# Patient Record
Sex: MALE | Race: WHITE | Employment: FULL TIME | URBAN - METROPOLITAN AREA
[De-identification: names, ages, dates, MRNs, and addresses within clinical notes are randomized per-mention and may not be internally consistent; named-entity substitution may affect disease eponyms.]

---

## 2017-01-03 DIAGNOSIS — F51.01 PRIMARY INSOMNIA: ICD-10-CM

## 2017-01-03 DIAGNOSIS — E06.3 CHRONIC LYMPHOCYTIC THYROIDITIS: Primary | ICD-10-CM

## 2017-01-03 DIAGNOSIS — R45.4 IRRITABLE: ICD-10-CM

## 2017-01-03 DIAGNOSIS — E06.3 CHRONIC LYMPHOCYTIC THYROIDITIS: ICD-10-CM

## 2017-01-03 PROCEDURE — 36415 COLL VENOUS BLD VENIPUNCTURE: CPT | Performed by: NURSE PRACTITIONER

## 2017-01-03 RX ORDER — LEVOTHYROXINE SODIUM 0.12 MG/1
TABLET ORAL
Qty: 180 TABLET | Refills: 0 | Status: SHIPPED | OUTPATIENT
Start: 2017-01-03 | End: 2017-09-29 | Stop reason: DRUGHIGH

## 2017-01-03 RX ORDER — ZOLPIDEM TARTRATE 5 MG/1
5 TABLET ORAL NIGHTLY PRN
Qty: 30 TABLET | Refills: 0 | Status: SHIPPED | OUTPATIENT
Start: 2017-01-03 | End: 2017-03-27 | Stop reason: SDUPTHER

## 2017-01-03 RX ORDER — ARIPIPRAZOLE 5 MG/1
5 TABLET ORAL DAILY
Qty: 90 TABLET | Refills: 0 | Status: SHIPPED | OUTPATIENT
Start: 2017-01-03 | End: 2017-06-19 | Stop reason: SDUPTHER

## 2017-02-03 ENCOUNTER — TELEPHONE (OUTPATIENT)
Dept: FAMILY MEDICINE CLINIC | Age: 37
End: 2017-02-03

## 2017-02-13 ENCOUNTER — NURSE ONLY (OUTPATIENT)
Dept: FAMILY MEDICINE CLINIC | Age: 37
End: 2017-02-13

## 2017-02-13 DIAGNOSIS — E06.3 CHRONIC LYMPHOCYTIC THYROIDITIS: Primary | ICD-10-CM

## 2017-02-13 LAB
T4 FREE: 1 NG/DL (ref 0.9–1.8)
TSH SERPL DL<=0.05 MIU/L-ACNC: 31.25 UIU/ML (ref 0.27–4.2)

## 2017-02-13 PROCEDURE — 36415 COLL VENOUS BLD VENIPUNCTURE: CPT | Performed by: FAMILY MEDICINE

## 2017-03-14 ENCOUNTER — OFFICE VISIT (OUTPATIENT)
Dept: FAMILY MEDICINE CLINIC | Age: 37
End: 2017-03-14

## 2017-03-14 VITALS
WEIGHT: 308 LBS | SYSTOLIC BLOOD PRESSURE: 122 MMHG | TEMPERATURE: 97.9 F | DIASTOLIC BLOOD PRESSURE: 76 MMHG | BODY MASS INDEX: 38.3 KG/M2 | HEART RATE: 97 BPM | HEIGHT: 75 IN

## 2017-03-14 DIAGNOSIS — E06.3 CHRONIC LYMPHOCYTIC THYROIDITIS: Primary | ICD-10-CM

## 2017-03-14 DIAGNOSIS — R06.83 SNORES: ICD-10-CM

## 2017-03-14 DIAGNOSIS — E66.9 OBESITY (BMI 30-39.9): ICD-10-CM

## 2017-03-14 PROCEDURE — 99214 OFFICE O/P EST MOD 30 MIN: CPT | Performed by: FAMILY MEDICINE

## 2017-03-14 RX ORDER — LEVOTHYROXINE SODIUM 137 UG/1
274 TABLET ORAL DAILY
Qty: 60 TABLET | Refills: 1 | Status: SHIPPED | OUTPATIENT
Start: 2017-03-14 | End: 2017-06-19 | Stop reason: SDUPTHER

## 2017-03-14 ASSESSMENT — ENCOUNTER SYMPTOMS: CONSTIPATION: 0

## 2017-05-12 ENCOUNTER — TELEPHONE (OUTPATIENT)
Dept: FAMILY MEDICINE CLINIC | Age: 37
End: 2017-05-12

## 2017-05-18 ENCOUNTER — NURSE ONLY (OUTPATIENT)
Dept: FAMILY MEDICINE CLINIC | Age: 37
End: 2017-05-18

## 2017-05-18 DIAGNOSIS — E06.3 CHRONIC LYMPHOCYTIC THYROIDITIS: ICD-10-CM

## 2017-05-18 DIAGNOSIS — E06.3 CHRONIC LYMPHOCYTIC THYROIDITIS: Primary | ICD-10-CM

## 2017-05-18 LAB
T4 FREE: 0.9 NG/DL (ref 0.9–1.8)
TSH SERPL DL<=0.05 MIU/L-ACNC: 21.34 UIU/ML (ref 0.27–4.2)

## 2017-05-18 PROCEDURE — 36415 COLL VENOUS BLD VENIPUNCTURE: CPT | Performed by: FAMILY MEDICINE

## 2017-05-23 DIAGNOSIS — E06.3 CHRONIC LYMPHOCYTIC THYROIDITIS: Primary | ICD-10-CM

## 2017-06-19 ENCOUNTER — TELEPHONE (OUTPATIENT)
Dept: FAMILY MEDICINE CLINIC | Age: 37
End: 2017-06-19

## 2017-06-19 DIAGNOSIS — R45.4 IRRITABLE: ICD-10-CM

## 2017-06-19 DIAGNOSIS — E06.3 CHRONIC LYMPHOCYTIC THYROIDITIS: ICD-10-CM

## 2017-06-19 RX ORDER — ARIPIPRAZOLE 5 MG/1
5 TABLET ORAL DAILY
Qty: 90 TABLET | Refills: 0 | Status: SHIPPED | OUTPATIENT
Start: 2017-06-19 | End: 2017-09-29 | Stop reason: SDUPTHER

## 2017-06-19 RX ORDER — LEVOTHYROXINE SODIUM 137 UG/1
TABLET ORAL
Qty: 60 TABLET | Refills: 0 | Status: SHIPPED | OUTPATIENT
Start: 2017-06-19 | End: 2017-07-29 | Stop reason: SDUPTHER

## 2017-07-25 ENCOUNTER — TELEPHONE (OUTPATIENT)
Dept: FAMILY MEDICINE CLINIC | Age: 37
End: 2017-07-25

## 2017-07-28 ENCOUNTER — NURSE ONLY (OUTPATIENT)
Dept: FAMILY MEDICINE CLINIC | Age: 37
End: 2017-07-28

## 2017-07-28 DIAGNOSIS — E06.3 CHRONIC LYMPHOCYTIC THYROIDITIS: Primary | ICD-10-CM

## 2017-07-28 DIAGNOSIS — E06.3 CHRONIC LYMPHOCYTIC THYROIDITIS: ICD-10-CM

## 2017-07-28 LAB
T4 FREE: 1.6 NG/DL (ref 0.9–1.8)
TSH SERPL DL<=0.05 MIU/L-ACNC: 2.91 UIU/ML (ref 0.27–4.2)

## 2017-07-28 PROCEDURE — 36415 COLL VENOUS BLD VENIPUNCTURE: CPT | Performed by: FAMILY MEDICINE

## 2017-07-29 DIAGNOSIS — E06.3 CHRONIC LYMPHOCYTIC THYROIDITIS: ICD-10-CM

## 2017-07-31 RX ORDER — LEVOTHYROXINE SODIUM 137 UG/1
TABLET ORAL
Qty: 60 TABLET | Refills: 0 | Status: SHIPPED | OUTPATIENT
Start: 2017-07-31 | End: 2017-09-06 | Stop reason: SDUPTHER

## 2017-09-06 ENCOUNTER — TELEPHONE (OUTPATIENT)
Dept: FAMILY MEDICINE CLINIC | Age: 37
End: 2017-09-06

## 2017-09-06 DIAGNOSIS — E06.3 CHRONIC LYMPHOCYTIC THYROIDITIS: ICD-10-CM

## 2017-09-06 RX ORDER — LEVOTHYROXINE SODIUM 137 UG/1
274 TABLET ORAL DAILY
Qty: 60 TABLET | Refills: 0 | Status: SHIPPED | OUTPATIENT
Start: 2017-09-06 | End: 2017-09-29 | Stop reason: SDUPTHER

## 2017-09-29 ENCOUNTER — PATIENT MESSAGE (OUTPATIENT)
Dept: FAMILY MEDICINE CLINIC | Age: 37
End: 2017-09-29

## 2017-09-29 DIAGNOSIS — R45.4 IRRITABLE: ICD-10-CM

## 2017-09-29 DIAGNOSIS — E06.3 CHRONIC LYMPHOCYTIC THYROIDITIS: ICD-10-CM

## 2017-09-29 RX ORDER — ARIPIPRAZOLE 5 MG/1
5 TABLET ORAL DAILY
Qty: 90 TABLET | Refills: 0 | Status: SHIPPED | OUTPATIENT
Start: 2017-09-29 | End: 2018-02-08 | Stop reason: SDUPTHER

## 2017-09-29 RX ORDER — LEVOTHYROXINE SODIUM 137 UG/1
274 TABLET ORAL DAILY
Qty: 60 TABLET | Refills: 0 | Status: SHIPPED | OUTPATIENT
Start: 2017-09-29 | End: 2017-10-13 | Stop reason: SDUPTHER

## 2017-10-13 ENCOUNTER — OFFICE VISIT (OUTPATIENT)
Dept: FAMILY MEDICINE CLINIC | Age: 37
End: 2017-10-13

## 2017-10-13 VITALS
TEMPERATURE: 98.7 F | DIASTOLIC BLOOD PRESSURE: 82 MMHG | SYSTOLIC BLOOD PRESSURE: 114 MMHG | BODY MASS INDEX: 38.92 KG/M2 | HEART RATE: 88 BPM | HEIGHT: 75 IN | OXYGEN SATURATION: 98 % | WEIGHT: 313 LBS

## 2017-10-13 DIAGNOSIS — E06.3 CHRONIC LYMPHOCYTIC THYROIDITIS: Primary | ICD-10-CM

## 2017-10-13 DIAGNOSIS — E66.01 MORBID OBESITY WITH BODY MASS INDEX (BMI) OF 40.0 TO 49.9 (HCC): ICD-10-CM

## 2017-10-13 DIAGNOSIS — Z00.00 PREVENTATIVE HEALTH CARE: ICD-10-CM

## 2017-10-13 DIAGNOSIS — Z23 NEED FOR PROPHYLACTIC VACCINATION AND INOCULATION AGAINST INFLUENZA: ICD-10-CM

## 2017-10-13 DIAGNOSIS — R45.4 IRRITABLE: ICD-10-CM

## 2017-10-13 LAB
A/G RATIO: 1.5 (ref 1.1–2.2)
ALBUMIN SERPL-MCNC: 4.3 G/DL (ref 3.4–5)
ALP BLD-CCNC: 52 U/L (ref 40–129)
ALT SERPL-CCNC: 28 U/L (ref 10–40)
ANION GAP SERPL CALCULATED.3IONS-SCNC: 15 MMOL/L (ref 3–16)
AST SERPL-CCNC: 21 U/L (ref 15–37)
BILIRUB SERPL-MCNC: 0.5 MG/DL (ref 0–1)
BUN BLDV-MCNC: 15 MG/DL (ref 7–20)
CALCIUM SERPL-MCNC: 9.5 MG/DL (ref 8.3–10.6)
CHLORIDE BLD-SCNC: 103 MMOL/L (ref 99–110)
CHOLESTEROL, TOTAL: 197 MG/DL (ref 0–199)
CO2: 25 MMOL/L (ref 21–32)
CREAT SERPL-MCNC: 0.8 MG/DL (ref 0.9–1.3)
GFR AFRICAN AMERICAN: >60
GFR NON-AFRICAN AMERICAN: >60
GLOBULIN: 2.8 G/DL
GLUCOSE BLD-MCNC: 95 MG/DL (ref 70–99)
HDLC SERPL-MCNC: 33 MG/DL (ref 40–60)
LDL CHOLESTEROL CALCULATED: 120 MG/DL
POTASSIUM SERPL-SCNC: 4.5 MMOL/L (ref 3.5–5.1)
SODIUM BLD-SCNC: 143 MMOL/L (ref 136–145)
TOTAL PROTEIN: 7.1 G/DL (ref 6.4–8.2)
TRIGL SERPL-MCNC: 220 MG/DL (ref 0–150)
TSH SERPL DL<=0.05 MIU/L-ACNC: 0.42 UIU/ML (ref 0.27–4.2)
VLDLC SERPL CALC-MCNC: 44 MG/DL

## 2017-10-13 PROCEDURE — 99214 OFFICE O/P EST MOD 30 MIN: CPT | Performed by: FAMILY MEDICINE

## 2017-10-13 PROCEDURE — 90630 INFLUENZA, QUADV, 18-64 YRS, ID, PF, MICRO INJ, 0.1ML (FLUZONE QUADV, PF): CPT | Performed by: FAMILY MEDICINE

## 2017-10-13 PROCEDURE — 90471 IMMUNIZATION ADMIN: CPT | Performed by: FAMILY MEDICINE

## 2017-10-13 PROCEDURE — 36415 COLL VENOUS BLD VENIPUNCTURE: CPT | Performed by: FAMILY MEDICINE

## 2017-10-13 RX ORDER — LEVOTHYROXINE SODIUM 137 UG/1
274 TABLET ORAL DAILY
Qty: 60 TABLET | Refills: 4 | Status: SHIPPED | OUTPATIENT
Start: 2017-10-13 | End: 2018-06-14 | Stop reason: SDUPTHER

## 2017-10-13 ASSESSMENT — PATIENT HEALTH QUESTIONNAIRE - PHQ9
SUM OF ALL RESPONSES TO PHQ QUESTIONS 1-9: 0
2. FEELING DOWN, DEPRESSED OR HOPELESS: 0
SUM OF ALL RESPONSES TO PHQ9 QUESTIONS 1 & 2: 0
1. LITTLE INTEREST OR PLEASURE IN DOING THINGS: 0

## 2017-10-13 ASSESSMENT — ENCOUNTER SYMPTOMS: CONSTIPATION: 0

## 2017-10-13 NOTE — PATIENT INSTRUCTIONS
season. But even when the vaccine doesn't exactly match these viruses, it may still provide some protection. Flu vaccine cannot prevent:  · Flu that is caused by a virus not covered by the vaccine. · Illnesses that look like flu but are not. Some people should not get this vaccine  Tell the person who is giving you the vaccine:  · If you have any severe (life-threatening) allergies. If you ever had a life-threatening allergic reaction after a dose of flu vaccine, or have a severe allergy to any part of this vaccine, you may be advised not to get vaccinated. Most, but not all, types of flu vaccine contain a small amount of egg protein. · If you ever had Guillain-Barré syndrome (also called GBS) Some people with a history of GBS should not get this vaccine. This should be discussed with your doctor. · If you are not feeling well. It is usually okay to get flu vaccine when you have a mild illness, but you might be asked to come back when you feel better. Risks of a vaccine reaction  With any medicine, including vaccines, there is a chance of reactions. These are usually mild and go away on their own, but serious reactions are also possible. Most people who get a flu shot do not have any problems with it. Minor problems following a flu shot include:  · Soreness, redness, or swelling where the shot was given  · Hoarseness  · Sore, red or itchy eyes  · Cough  · Fever  · Aches  · Headache  · Itching  · Fatigue  If these problems occur, they usually begin soon after the shot and last 1 or 2 days. More serious problems following a flu shot can include the following:  · There may be a small increased risk of Guillain-Barré Syndrome (GBS) after inactivated flu vaccine. This risk has been estimated at 1 or 2 additional cases per million people vaccinated. This is much lower than the risk of severe complications from flu, which can be prevented by flu vaccine.   · Alyssa Ship children who get the flu shot along with pneumococcal vaccine (PCV13) and/or DTaP vaccine at the same time might be slightly more likely to have a seizure caused by fever. Ask your doctor for more information. Tell your doctor if a child who is getting flu vaccine has ever had a seizure  Problems that could happen after any injected vaccine:  · People sometimes faint after a medical procedure, including vaccination. Sitting or lying down for about 15 minutes can help prevent fainting, and injuries caused by a fall. Tell your doctor if you feel dizzy, or have vision changes or ringing in the ears. · Some people get severe pain in the shoulder and have difficulty moving the arm where a shot was given. This happens very rarely. · Any medication can cause a severe allergic reaction. Such reactions from a vaccine are very rare, estimated at about 1 in a million doses, and would happen within a few minutes to a few hours after the vaccination. As with any medicine, there is a very remote chance of a vaccine causing a serious injury or death. The safety of vaccines is always being monitored. For more information, visit: www.cdc.gov/vaccinesafety/. What if there is a serious reaction? What should I look for? · Look for anything that concerns you, such as signs of a severe allergic reaction, very high fever, or unusual behavior. Signs of a severe allergic reaction can include hives, swelling of the face and throat, difficulty breathing, a fast heartbeat, dizziness, and weakness  usually within a few minutes to a few hours after the vaccination. What should I do? · If you think it is a severe allergic reaction or other emergency that can't wait, call 9-1-1 and get the person to the nearest hospital. Otherwise, call your doctor. · Reactions should be reported to the \"Vaccine Adverse Event Reporting System\" (VAERS). Your doctor should file this report, or you can do it yourself through the VAERS website at www.vaers. hhs.gov, or by calling

## 2018-01-16 ENCOUNTER — OFFICE VISIT (OUTPATIENT)
Dept: FAMILY MEDICINE CLINIC | Age: 38
End: 2018-01-16

## 2018-01-16 VITALS
WEIGHT: 315 LBS | DIASTOLIC BLOOD PRESSURE: 72 MMHG | TEMPERATURE: 98.7 F | BODY MASS INDEX: 39.87 KG/M2 | HEART RATE: 106 BPM | SYSTOLIC BLOOD PRESSURE: 118 MMHG | OXYGEN SATURATION: 95 %

## 2018-01-16 DIAGNOSIS — B34.9 VIRAL ILLNESS: ICD-10-CM

## 2018-01-16 DIAGNOSIS — R06.2 WHEEZING: Primary | ICD-10-CM

## 2018-01-16 LAB
INFLUENZA A ANTIBODY: NORMAL
INFLUENZA B ANTIBODY: NORMAL

## 2018-01-16 PROCEDURE — 87804 INFLUENZA ASSAY W/OPTIC: CPT | Performed by: FAMILY MEDICINE

## 2018-01-16 PROCEDURE — 99214 OFFICE O/P EST MOD 30 MIN: CPT | Performed by: FAMILY MEDICINE

## 2018-01-16 RX ORDER — BUDESONIDE AND FORMOTEROL FUMARATE DIHYDRATE 160; 4.5 UG/1; UG/1
2 AEROSOL RESPIRATORY (INHALATION) 2 TIMES DAILY
Qty: 1 INHALER | Refills: 3 | Status: SHIPPED | OUTPATIENT
Start: 2018-01-16 | End: 2018-04-05 | Stop reason: ALTCHOICE

## 2018-01-16 RX ORDER — ALBUTEROL SULFATE 90 UG/1
2 AEROSOL, METERED RESPIRATORY (INHALATION)
Qty: 1 INHALER | Refills: 1 | Status: SHIPPED | OUTPATIENT
Start: 2018-01-16 | End: 2018-04-05 | Stop reason: ALTCHOICE

## 2018-01-16 ASSESSMENT — ENCOUNTER SYMPTOMS
SHORTNESS OF BREATH: 1
SORE THROAT: 0
COUGH: 1

## 2018-01-25 ENCOUNTER — PATIENT MESSAGE (OUTPATIENT)
Dept: FAMILY MEDICINE CLINIC | Age: 38
End: 2018-01-25

## 2018-01-26 RX ORDER — METHYLPREDNISOLONE 4 MG/1
TABLET ORAL
Qty: 21 TABLET | Refills: 0 | Status: SHIPPED | OUTPATIENT
Start: 2018-01-26 | End: 2018-02-01

## 2018-01-26 RX ORDER — AZITHROMYCIN 250 MG/1
TABLET, FILM COATED ORAL
Qty: 1 PACKET | Refills: 0 | Status: SHIPPED | OUTPATIENT
Start: 2018-01-26 | End: 2018-02-05

## 2018-02-08 DIAGNOSIS — R45.4 IRRITABLE: ICD-10-CM

## 2018-02-08 NOTE — TELEPHONE ENCOUNTER
Requested Prescriptions     Pending Prescriptions Disp Refills    ABILIFY 5 MG tablet [Pharmacy Med Name: ABILIFY 5MG         TAB] 90 tablet 0     Sig: TAKE ONE TABLET BY MOUTH ONCE DAILY   last visit: 01.16.18

## 2018-02-09 RX ORDER — ARIPIPRAZOLE 5 MG
TABLET ORAL
Qty: 90 TABLET | Refills: 0 | Status: SHIPPED | OUTPATIENT
Start: 2018-02-09 | End: 2018-06-14 | Stop reason: SDUPTHER

## 2018-02-19 ENCOUNTER — TELEPHONE (OUTPATIENT)
Dept: BARIATRICS/WEIGHT MGMT | Age: 38
End: 2018-02-19

## 2018-02-22 ENCOUNTER — TELEPHONE (OUTPATIENT)
Dept: BARIATRICS/WEIGHT MGMT | Age: 38
End: 2018-02-22

## 2018-03-08 ENCOUNTER — OFFICE VISIT (OUTPATIENT)
Dept: BARIATRICS/WEIGHT MGMT | Age: 38
End: 2018-03-08

## 2018-03-08 VITALS
BODY MASS INDEX: 39.17 KG/M2 | RESPIRATION RATE: 19 BRPM | DIASTOLIC BLOOD PRESSURE: 86 MMHG | WEIGHT: 315 LBS | HEART RATE: 87 BPM | HEIGHT: 75 IN | SYSTOLIC BLOOD PRESSURE: 121 MMHG

## 2018-03-08 DIAGNOSIS — E66.01 MORBID OBESITY WITH BMI OF 40.0-44.9, ADULT (HCC): ICD-10-CM

## 2018-03-08 DIAGNOSIS — K21.9 CHRONIC GERD: ICD-10-CM

## 2018-03-08 DIAGNOSIS — R06.83 SNORING: ICD-10-CM

## 2018-03-08 DIAGNOSIS — Z01.818 PRE-OPERATIVE CLEARANCE: ICD-10-CM

## 2018-03-08 PROCEDURE — 99205 OFFICE O/P NEW HI 60 MIN: CPT | Performed by: SURGERY

## 2018-03-08 NOTE — PROGRESS NOTES
Mike Mitchell is a 44 y.o. male with a date of birth of 1980. Vitals:    03/08/18 1058   BP: 121/86   Pulse: 87   Resp: 19    BMI: Body mass index is 42.43 kg/m². Obesity Classification: Class III    Weight History: Wt Readings from Last 3 Encounters:   03/08/18 (!) 339 lb 8 oz (154 kg)   01/16/18 (!) 319 lb (144.7 kg)   10/13/17 (!) 313 lb (142 kg)       Patient's lowest adult weight was 184 lbs at age 24. Patient's highest adult weight was 347 lbs at age 39. Patient has participated in the following weight loss programs: Atkins Diet, and low carb, portion control. Patient has not participated in meal replacement/liquid diets. Patient has participated in weight loss medications. Patient is lactose intolerant - low tolerance of cheese and yogurt, ice cream, milk - states he still consumes these things. Patient does not have Sabianist/cultural food concerns. Patient does have food allergies - HAZELNUTS. Patient does tolerate artificial sweeteners. Patient dines out to a sit down restaurant 4 times per week. Patient dines out to a fast food restaurant 4 times per week. 24 hour recall/food frequency chart:  Breakfast: no.   Snack: yes. Tuna and crackers OR grilled cheese sandwich from machine and pop  Lunch: yes. Tuna and crackers OR grilled cheese sandwich from machine and pop  Snack: yes. Egg salad sand OR chicken salad sand from vending [employer]ing and pop  Dinner: yes. Fast food - two big sandwiches a jeong and pop  Snack: yes. Fruit snacks   Drinks throughout the day: soda, energy drinks  Do you drink alcohol? Yes. How often/how much alcohol do you drink: 6 Beers per month. Patient does meet the criteria for binge eating disorder. Patient does have grazing. Patient does not have night eating. Patient does have a history of emotional eating or eating out of boredom. It does take an unusually large amount of food for the patient to feel comfortably full.   Most days the patient eats until he is stuffed. Patient describes level of activity as sedentary. Surgery  Patient's greatest concern about having surgery is: none. Patient describes the motivation for weight loss surgery to be: Longevity and better quality of life; wants to have more energy to play with kids. Patient does feel confident in his ability to make these changes. The patient's expectations of post-surgical eating habits realistic. Patient states he does understand the consequences of not complying with post-op food guidelines. Patient states he understands the long term changes in food intake that will be necessary for all occasions after surgery for the rest of her life. he does understand the long term food changes. Patient is deemed nutritionally appropriate to proceed. Goals  Weight: 200  Health Improvement: Less depression, improved thyroid function    Assessment  Nutritional Needs: RMR=(9.99 x 154) + (6.25 x 190.5) - (4.92 x 37 y.o.) +5= 2547 kcal x 1.4 (sedentary activity factor)= 3566 kcal - 1000 (for 2 lb weight loss/week)= 2566 kcal.    Plan  Surgical procedure desired: Gastric Sleeve    Plan/Recommendations: General weight loss/lifestyle modification strategies discussed (elicit support from others; identify saboteurs; non-food rewards, etc). Goals:   -Eat 4-5 times daily  -Avoid high fat and high sugar foods  -Include protein with all meals and snacks  -Avoid carbonation and caffeine  -Avoid calorie containing beverages  -Increase physical activity as tolerated    PES Statement:  Overweight/Obesity related to lack of exercise, sedentary lifestyle, unhealthy eating habits, and unsuccessful diet attempts as evidenced by BMI. Body mass index is 42.43 kg/m². .    Will follow up as necessary.     [employer]

## 2018-03-08 NOTE — PATIENT INSTRUCTIONS
Patient received dietary handouts and education. Labs ordered. Cardiac Clearance. Psych Evaluation. Sleep Study. US Gallbladder. EGD  Support Group. PCP Letter. Quit Smoking and Alcohol   Weight History 2 yrs. F/U in 4 weeks           Patient advised that its their responsibility to follow up for studies, referrals and/or labs ordered today.

## 2018-03-08 NOTE — PROGRESS NOTES
[facility]) Physicians   General & Laparoscopic Surgery  [facility]    Chief Complaint   Patient presents with    Bariatric, Initial Visit     NP GEOVANNY            HPI:    Freddy Weaver is a very pleasant 44 y.o. obese male ,   Body mass index is 42.43 kg/m². And multiple medical problems who is presenting for weight loss surgery evaluation and consultation by Dr. Amanda Franco. Patient has been struggling for several years now with obesity. Patient feels the weight is an obstacle to achieve and perform things in daily living as well risk on health. Tries to diet, and exercise but can't keep the weight off. Patient tried Atkins Diet, and low carb, portion control. Patient has not participated in meal replacement/liquid diets.     Patient has participated in weight loss medications and other regimens, but with no sustainable weight loss. Patient  is very determined to lose weight and be healthy, and is interested in surgical weight loss to achieve this goal.    Otherwise patient denies any nausea, vomiting, fevers, chills, shortness of breath, chest pain, constipation or urinary symptoms.         Pain Assessment   Denies any abdominal pain     Past Medical History:   Diagnosis Date    Depression     Irritable     Nephrolith 5/2010    Thyroid disease     hypothyroid - chronic lymphocytic thyroiditis     Past Surgical History:   Procedure Laterality Date    HERNIA REPAIR  4/5/16    ventral    THYROIDECTOMY, PARTIAL  3/15    WISDOM TOOTH EXTRACTION       Family History   Problem Relation Age of Onset    Depression Mother     Arthritis Father      RA    Cancer Sister      thyroid    Depression Sister     Cancer Paternal Grandfather      lung    Cancer Paternal Aunt      lung     Social History   Substance Use Topics    Smoking status: Former Smoker     Packs/day: 0.50     Years: 17.00     Types: Cigarettes     Quit date: 1/7/2018    Smokeless tobacco: Never Used   [place] is 4. GCS verbal subscore is 5. GCS motor subscore is 6. Skin: Skin is warm and dry. No abrasion and no rash noted. Patient  is not diaphoretic. No cyanosis or erythema. Psychiatric: Patient has a normal mood and affect. speech is normal and behavior is normal. Cognition and memory are normal.         Mark Wong was seen today for bariatric, initial visit. Diagnoses and all orders for this visit:    Morbid obesity with BMI of 40.0-44.9, adult ([facility] [address]  -     CBC with Differential; Future  -     Comprehensive Metabolic Panel; Future  -     TSH with Reflex; Future  -     Iron and TIBC; Future  -     Lipid Panel; Future  -     Vitamin B12 & Folate; Future  -     Vitamin D 25 Hydroxy; Future  -     Hemoglobin A1C; Future  -     US Gallbladder Ruq; Future  -     Ambulatory referral to Cardiology  -     Ambulatory referral to Sleep Medicine    Pre-operative clearance  -     CBC with Differential; Future  -     Comprehensive Metabolic Panel; Future  -     TSH with Reflex; Future  -     Iron and TIBC; Future  -     Lipid Panel; Future  -     Vitamin B12 & Folate; Future  -     Vitamin D 25 Hydroxy; Future  -     Hemoglobin A1C; Future  -     US Gallbladder Ruq; Future  -     Ambulatory referral to Cardiology  -     Ambulatory referral to Sleep Medicine    Snoring  -     CBC with Differential; Future  -     Comprehensive Metabolic Panel; Future  -     TSH with Reflex; Future  -     Iron and TIBC; Future  -     Lipid Panel; Future  -     Vitamin B12 & Folate; Future  -     Vitamin D 25 Hydroxy; Future  -     Hemoglobin A1C; Future  -     US Gallbladder Ruq; Future  -     Ambulatory referral to Cardiology  -     Ambulatory referral to Sleep Medicine    Chronic GERD  -     CBC with Differential; Future  -     Comprehensive Metabolic Panel; Future  -     TSH with Reflex; Future  -     Iron and TIBC; Future  -     Lipid Panel; Future  -     Vitamin B12 & Folate; Future  -     Vitamin D 25 Hydroxy;  Future  -     Hemoglobin A1C;

## 2018-03-21 ENCOUNTER — HOSPITAL ENCOUNTER (OUTPATIENT)
Dept: ULTRASOUND IMAGING | Age: 38
Discharge: HOME | End: 2018-03-21
Attending: SURGERY | Admitting: SURGERY

## 2018-03-21 DIAGNOSIS — Z01.818 PRE-OPERATIVE CLEARANCE: ICD-10-CM

## 2018-03-21 DIAGNOSIS — E66.01 MORBID OBESITY WITH BMI OF 40.0-44.9, ADULT (HCC): ICD-10-CM

## 2018-03-21 DIAGNOSIS — R06.83 SNORING: ICD-10-CM

## 2018-03-21 DIAGNOSIS — E66.01 MORBID (SEVERE) OBESITY DUE TO EXCESS CALORIES (HCC): ICD-10-CM

## 2018-03-21 DIAGNOSIS — K21.9 CHRONIC GERD: ICD-10-CM

## 2018-04-04 PROBLEM — Z01.810 PREOPERATIVE CARDIOVASCULAR EXAMINATION: Status: ACTIVE | Noted: 2018-03-08

## 2018-04-05 ENCOUNTER — OFFICE VISIT (OUTPATIENT)
Dept: CARDIOLOGY CLINIC | Age: 38
End: 2018-04-05

## 2018-04-05 VITALS
DIASTOLIC BLOOD PRESSURE: 78 MMHG | BODY MASS INDEX: 39.17 KG/M2 | OXYGEN SATURATION: 96 % | WEIGHT: 315 LBS | SYSTOLIC BLOOD PRESSURE: 126 MMHG | HEART RATE: 86 BPM | HEIGHT: 75 IN

## 2018-04-05 DIAGNOSIS — R07.2 PRECORDIAL PAIN: ICD-10-CM

## 2018-04-05 DIAGNOSIS — Z01.810 PREOPERATIVE CARDIOVASCULAR EXAMINATION: Primary | ICD-10-CM

## 2018-04-05 DIAGNOSIS — R07.9 CHEST PAIN, UNSPECIFIED TYPE: ICD-10-CM

## 2018-04-05 DIAGNOSIS — R06.09 DOE (DYSPNEA ON EXERTION): ICD-10-CM

## 2018-04-05 PROCEDURE — 93000 ELECTROCARDIOGRAM COMPLETE: CPT | Performed by: INTERNAL MEDICINE

## 2018-04-05 PROCEDURE — 99205 OFFICE O/P NEW HI 60 MIN: CPT | Performed by: INTERNAL MEDICINE

## 2018-04-13 ENCOUNTER — OFFICE VISIT (OUTPATIENT)
Dept: BARIATRICS/WEIGHT MGMT | Age: 38
End: 2018-04-13

## 2018-04-13 VITALS
WEIGHT: 315 LBS | SYSTOLIC BLOOD PRESSURE: 134 MMHG | BODY MASS INDEX: 39.17 KG/M2 | HEIGHT: 75 IN | RESPIRATION RATE: 16 BRPM | HEART RATE: 82 BPM | DIASTOLIC BLOOD PRESSURE: 84 MMHG

## 2018-04-13 DIAGNOSIS — K21.9 CHRONIC GERD: ICD-10-CM

## 2018-04-13 DIAGNOSIS — E66.01 MORBID OBESITY WITH BMI OF 40.0-44.9, ADULT (HCC): Primary | ICD-10-CM

## 2018-04-13 DIAGNOSIS — R06.83 SNORING: ICD-10-CM

## 2018-04-13 PROCEDURE — 99213 OFFICE O/P EST LOW 20 MIN: CPT | Performed by: NURSE PRACTITIONER

## 2018-04-13 ASSESSMENT — ENCOUNTER SYMPTOMS
GASTROINTESTINAL NEGATIVE: 1
RESPIRATORY NEGATIVE: 1

## 2018-04-24 ENCOUNTER — OFFICE VISIT (OUTPATIENT)
Dept: PULMONOLOGY | Age: 38
End: 2018-04-24

## 2018-04-24 VITALS
HEART RATE: 82 BPM | DIASTOLIC BLOOD PRESSURE: 77 MMHG | OXYGEN SATURATION: 97 % | RESPIRATION RATE: 16 BRPM | HEIGHT: 75 IN | SYSTOLIC BLOOD PRESSURE: 117 MMHG | BODY MASS INDEX: 39.17 KG/M2 | WEIGHT: 315 LBS | TEMPERATURE: 99 F

## 2018-04-24 DIAGNOSIS — Z87.891 FORMER SMOKER: ICD-10-CM

## 2018-04-24 DIAGNOSIS — G47.30 OBSERVED SLEEP APNEA: Primary | ICD-10-CM

## 2018-04-24 DIAGNOSIS — Z01.818 PRE-OPERATIVE CLEARANCE: ICD-10-CM

## 2018-04-24 DIAGNOSIS — G47.10 HYPERSOMNIA: ICD-10-CM

## 2018-04-24 DIAGNOSIS — R53.83 FATIGUE, UNSPECIFIED TYPE: ICD-10-CM

## 2018-04-24 DIAGNOSIS — R06.83 SNORING: ICD-10-CM

## 2018-04-24 PROCEDURE — 99204 OFFICE O/P NEW MOD 45 MIN: CPT | Performed by: INTERNAL MEDICINE

## 2018-04-24 ASSESSMENT — SLEEP AND FATIGUE QUESTIONNAIRES
HOW LIKELY ARE YOU TO NOD OFF OR FALL ASLEEP IN A CAR, WHILE STOPPED FOR A FEW MINUTES IN TRAFFIC: 0
HOW LIKELY ARE YOU TO NOD OFF OR FALL ASLEEP WHILE SITTING INACTIVE IN A PUBLIC PLACE: 0
HOW LIKELY ARE YOU TO NOD OFF OR FALL ASLEEP WHILE SITTING AND READING: 3
ESS TOTAL SCORE: 11
HOW LIKELY ARE YOU TO NOD OFF OR FALL ASLEEP WHEN YOU ARE A PASSENGER IN A CAR FOR AN HOUR WITHOUT A BREAK: 3
HOW LIKELY ARE YOU TO NOD OFF OR FALL ASLEEP WHILE WATCHING TV: 1
HOW LIKELY ARE YOU TO NOD OFF OR FALL ASLEEP WHILE LYING DOWN TO REST IN THE AFTERNOON WHEN CIRCUMSTANCES PERMIT: 3
HOW LIKELY ARE YOU TO NOD OFF OR FALL ASLEEP WHILE SITTING AND TALKING TO SOMEONE: 0
NECK CIRCUMFERENCE (INCHES): 18.25
HOW LIKELY ARE YOU TO NOD OFF OR FALL ASLEEP WHILE SITTING QUIETLY AFTER LUNCH WITHOUT ALCOHOL: 1

## 2018-04-25 ENCOUNTER — HOSPITAL ENCOUNTER (OUTPATIENT)
Dept: OTHER | Age: 38
Discharge: HOME | End: 2018-04-25
Attending: SURGERY | Admitting: SURGERY

## 2018-04-25 ENCOUNTER — HOSPITAL ENCOUNTER (OUTPATIENT)
Dept: OTHER | Age: 38
Discharge: HOME | End: 2018-04-25
Attending: INTERNAL MEDICINE | Admitting: INTERNAL MEDICINE

## 2018-04-25 DIAGNOSIS — Z01.811 PRE-OP CHEST EXAM: ICD-10-CM

## 2018-04-25 LAB
A/G RATIO: 1.3 (ref 1.1–2.2)
ALBUMIN SERPL-MCNC: 4.2 G/DL (ref 3.4–5)
ALP BLD-CCNC: 53 U/L (ref 40–129)
ALT SERPL-CCNC: 38 U/L (ref 10–40)
ANION GAP SERPL CALCULATED.3IONS-SCNC: 17 MMOL/L (ref 3–16)
AST SERPL-CCNC: 25 U/L (ref 15–37)
BASOPHILS ABSOLUTE: 0 K/UL (ref 0–0.2)
BASOPHILS RELATIVE PERCENT: 0.4 %
BILIRUB SERPL-MCNC: 0.4 MG/DL (ref 0–1)
BUN BLDV-MCNC: 14 MG/DL (ref 7–20)
CALCIUM SERPL-MCNC: 9.1 MG/DL (ref 8.3–10.6)
CHLORIDE BLD-SCNC: 102 MMOL/L (ref 99–110)
CHOLESTEROL, FASTING: 185 MG/DL (ref 0–199)
CO2: 23 MMOL/L (ref 21–32)
CREAT SERPL-MCNC: 0.8 MG/DL (ref 0.9–1.3)
EOSINOPHILS ABSOLUTE: 0.2 K/UL (ref 0–0.6)
EOSINOPHILS RELATIVE PERCENT: 3 %
FOLATE: 18.91 NG/ML (ref 4.78–24.2)
GFR AFRICAN AMERICAN: >60
GFR NON-AFRICAN AMERICAN: >60
GLOBULIN: 3.3 G/DL
GLUCOSE FASTING: 98 MG/DL (ref 70–99)
HCT VFR BLD CALC: 43.9 % (ref 40.5–52.5)
HDLC SERPL-MCNC: 26 MG/DL (ref 40–60)
HEMOGLOBIN: 15.2 G/DL (ref 13.5–17.5)
IRON SATURATION: 25 % (ref 20–50)
IRON: 80 UG/DL (ref 59–158)
LDL CHOLESTEROL CALCULATED: ABNORMAL MG/DL
LDL CHOLESTEROL DIRECT: 108 MG/DL
LYMPHOCYTES ABSOLUTE: 2.1 K/UL (ref 1–5.1)
LYMPHOCYTES RELATIVE PERCENT: 33.1 %
MCH RBC QN AUTO: 30 PG (ref 26–34)
MCHC RBC AUTO-ENTMCNC: 34.7 G/DL (ref 31–36)
MCV RBC AUTO: 86.4 FL (ref 80–100)
MONOCYTES ABSOLUTE: 0.7 K/UL (ref 0–1.3)
MONOCYTES RELATIVE PERCENT: 11.7 %
NEUTROPHILS ABSOLUTE: 3.3 K/UL (ref 1.7–7.7)
NEUTROPHILS RELATIVE PERCENT: 51.8 %
PDW BLD-RTO: 13.4 % (ref 12.4–15.4)
PLATELET # BLD: 205 K/UL (ref 135–450)
PMV BLD AUTO: 9.2 FL (ref 5–10.5)
POTASSIUM SERPL-SCNC: 4.6 MMOL/L (ref 3.5–5.1)
RBC # BLD: 5.08 M/UL (ref 4.2–5.9)
SODIUM BLD-SCNC: 142 MMOL/L (ref 136–145)
T4 FREE: 1.4 NG/DL (ref 0.9–1.8)
TOTAL IRON BINDING CAPACITY: 321 UG/DL (ref 260–445)
TOTAL PROTEIN: 7.5 G/DL (ref 6.4–8.2)
TRIGLYCERIDE, FASTING: 315 MG/DL (ref 0–150)
TSH REFLEX: 12.42 UIU/ML (ref 0.27–4.2)
VITAMIN B-12: 343 PG/ML (ref 211–911)
VITAMIN D 25-HYDROXY: 25 NG/ML
VLDLC SERPL CALC-MCNC: ABNORMAL MG/DL
WBC # BLD: 6.3 K/UL (ref 4–11)

## 2018-04-26 ENCOUNTER — TELEPHONE (OUTPATIENT)
Dept: BARIATRICS/WEIGHT MGMT | Age: 38
End: 2018-04-26

## 2018-04-26 DIAGNOSIS — E55.9 VITAMIN D DEFICIENCY: ICD-10-CM

## 2018-04-26 DIAGNOSIS — E78.2 MIXED HYPERLIPIDEMIA: Primary | ICD-10-CM

## 2018-04-26 LAB
ESTIMATED AVERAGE GLUCOSE: 111.2 MG/DL
HBA1C MFR BLD: 5.5 %

## 2018-05-04 PROBLEM — Z01.810 PREOPERATIVE CARDIOVASCULAR EXAMINATION: Status: RESOLVED | Noted: 2018-03-08 | Resolved: 2018-05-04

## 2018-05-11 ENCOUNTER — HOSPITAL ENCOUNTER (OUTPATIENT)
Dept: ENDOSCOPY | Age: 38
Discharge: HOME | End: 2018-05-11
Attending: SURGERY | Admitting: SURGERY

## 2018-05-11 VITALS
SYSTOLIC BLOOD PRESSURE: 124 MMHG | HEART RATE: 67 BPM | TEMPERATURE: 97.3 F | RESPIRATION RATE: 16 BRPM | OXYGEN SATURATION: 96 % | BODY MASS INDEX: 39.17 KG/M2 | DIASTOLIC BLOOD PRESSURE: 89 MMHG | WEIGHT: 315 LBS | HEIGHT: 75 IN

## 2018-05-11 PROCEDURE — 43239 EGD BIOPSY SINGLE/MULTIPLE: CPT | Performed by: SURGERY

## 2018-05-11 RX ORDER — HYDROCODONE BITARTRATE AND ACETAMINOPHEN 5; 325 MG/1; MG/1
1 TABLET ORAL
Status: ACTIVE | OUTPATIENT
Start: 2018-05-11 | End: 2018-05-11

## 2018-05-11 RX ORDER — MEPERIDINE HYDROCHLORIDE 25 MG/ML
12.5 INJECTION INTRAMUSCULAR; INTRAVENOUS; SUBCUTANEOUS EVERY 5 MIN PRN
Status: DISCONTINUED | OUTPATIENT
Start: 2018-05-11 | End: 2018-05-12 | Stop reason: HOSPADM

## 2018-05-11 RX ORDER — SODIUM CHLORIDE 9 MG/ML
INJECTION, SOLUTION INTRAVENOUS CONTINUOUS
Status: DISCONTINUED | OUTPATIENT
Start: 2018-05-11 | End: 2018-05-12 | Stop reason: HOSPADM

## 2018-05-11 RX ORDER — OMEPRAZOLE 20 MG/1
20 CAPSULE, DELAYED RELEASE ORAL DAILY
Qty: 30 CAPSULE | Refills: 3 | Status: SHIPPED | OUTPATIENT
Start: 2018-05-11 | End: 2019-04-17 | Stop reason: ALTCHOICE

## 2018-05-11 RX ORDER — LIDOCAINE HYDROCHLORIDE 10 MG/ML
1 INJECTION, SOLUTION EPIDURAL; INFILTRATION; INTRACAUDAL; PERINEURAL
Status: ACTIVE | OUTPATIENT
Start: 2018-05-11 | End: 2018-05-11

## 2018-05-11 RX ORDER — SODIUM CHLORIDE 0.9 % (FLUSH) 0.9 %
10 SYRINGE (ML) INJECTION PRN
Status: DISCONTINUED | OUTPATIENT
Start: 2018-05-11 | End: 2018-05-12 | Stop reason: HOSPADM

## 2018-05-11 RX ORDER — SODIUM CHLORIDE 0.9 % (FLUSH) 0.9 %
10 SYRINGE (ML) INJECTION EVERY 12 HOURS SCHEDULED
Status: DISCONTINUED | OUTPATIENT
Start: 2018-05-11 | End: 2018-05-12 | Stop reason: HOSPADM

## 2018-05-11 RX ORDER — ONDANSETRON 2 MG/ML
4 INJECTION INTRAMUSCULAR; INTRAVENOUS
Status: ACTIVE | OUTPATIENT
Start: 2018-05-11 | End: 2018-05-11

## 2018-05-11 RX ADMIN — SODIUM CHLORIDE: 9 INJECTION, SOLUTION INTRAVENOUS at 09:18

## 2018-05-11 ASSESSMENT — PAIN - FUNCTIONAL ASSESSMENT: PAIN_FUNCTIONAL_ASSESSMENT: 0-10

## 2018-05-16 ENCOUNTER — HOSPITAL ENCOUNTER (OUTPATIENT)
Dept: PULMONOLOGY | Age: 38
Discharge: HOME | End: 2018-05-16
Attending: INTERNAL MEDICINE | Admitting: INTERNAL MEDICINE

## 2018-05-16 VITALS — OXYGEN SATURATION: 97 %

## 2018-05-16 DIAGNOSIS — Z01.818 ENCOUNTER FOR OTHER PREPROCEDURAL EXAMINATION: ICD-10-CM

## 2018-05-16 RX ORDER — ALBUTEROL SULFATE 2.5 MG/3ML
2.5 SOLUTION RESPIRATORY (INHALATION) ONCE
Status: COMPLETED | OUTPATIENT
Start: 2018-05-16 | End: 2018-05-16

## 2018-05-16 RX ADMIN — ALBUTEROL SULFATE 2.5 MG: 2.5 SOLUTION RESPIRATORY (INHALATION) at 09:34

## 2018-05-17 ENCOUNTER — HOSPITAL ENCOUNTER (OUTPATIENT)
Dept: NON INVASIVE DIAGNOSTICS | Age: 38
Discharge: HOME | End: 2018-05-17
Attending: INTERNAL MEDICINE | Admitting: INTERNAL MEDICINE

## 2018-05-17 DIAGNOSIS — Z01.810 ENCOUNTER FOR PREPROCEDURAL CARDIOVASCULAR EXAMINATION: ICD-10-CM

## 2018-05-21 ENCOUNTER — TELEPHONE (OUTPATIENT)
Dept: CARDIOLOGY CLINIC | Age: 38
End: 2018-05-21

## 2018-05-22 ENCOUNTER — OFFICE VISIT (OUTPATIENT)
Dept: BARIATRICS/WEIGHT MGMT | Age: 38
End: 2018-05-22

## 2018-05-22 VITALS
WEIGHT: 315 LBS | SYSTOLIC BLOOD PRESSURE: 138 MMHG | HEART RATE: 82 BPM | DIASTOLIC BLOOD PRESSURE: 88 MMHG | HEIGHT: 75 IN | BODY MASS INDEX: 39.17 KG/M2

## 2018-05-22 DIAGNOSIS — E66.01 MORBID OBESITY WITH BMI OF 40.0-44.9, ADULT (HCC): Primary | ICD-10-CM

## 2018-05-22 DIAGNOSIS — E55.9 VITAMIN D DEFICIENCY: ICD-10-CM

## 2018-05-22 DIAGNOSIS — E78.2 MIXED HYPERLIPIDEMIA: ICD-10-CM

## 2018-05-22 DIAGNOSIS — K21.9 CHRONIC GERD: ICD-10-CM

## 2018-05-22 DIAGNOSIS — R06.83 SNORING: ICD-10-CM

## 2018-05-22 PROCEDURE — 99213 OFFICE O/P EST LOW 20 MIN: CPT | Performed by: NURSE PRACTITIONER

## 2018-05-22 ASSESSMENT — ENCOUNTER SYMPTOMS
GASTROINTESTINAL NEGATIVE: 1
RESPIRATORY NEGATIVE: 1

## 2018-06-04 ENCOUNTER — HOSPITAL ENCOUNTER (OUTPATIENT)
Dept: SLEEP MEDICINE | Age: 38
Discharge: HOME | End: 2018-06-06
Attending: INTERNAL MEDICINE | Admitting: INTERNAL MEDICINE

## 2018-06-04 DIAGNOSIS — G47.30 OBSERVED SLEEP APNEA: ICD-10-CM

## 2018-06-04 DIAGNOSIS — R53.83 FATIGUE, UNSPECIFIED TYPE: ICD-10-CM

## 2018-06-04 DIAGNOSIS — R06.83 SNORING: ICD-10-CM

## 2018-06-04 DIAGNOSIS — G47.10 HYPERSOMNIA: ICD-10-CM

## 2018-06-05 DIAGNOSIS — G47.33 OSA (OBSTRUCTIVE SLEEP APNEA): Primary | ICD-10-CM

## 2018-06-14 ENCOUNTER — OFFICE VISIT (OUTPATIENT)
Dept: FAMILY MEDICINE CLINIC | Age: 38
End: 2018-06-14

## 2018-06-14 VITALS
DIASTOLIC BLOOD PRESSURE: 84 MMHG | HEART RATE: 72 BPM | WEIGHT: 315 LBS | TEMPERATURE: 98.2 F | BODY MASS INDEX: 41.62 KG/M2 | SYSTOLIC BLOOD PRESSURE: 122 MMHG

## 2018-06-14 DIAGNOSIS — R45.4 IRRITABLE: ICD-10-CM

## 2018-06-14 DIAGNOSIS — E06.3 CHRONIC LYMPHOCYTIC THYROIDITIS: ICD-10-CM

## 2018-06-14 DIAGNOSIS — F51.01 PRIMARY INSOMNIA: ICD-10-CM

## 2018-06-14 DIAGNOSIS — E03.9 HYPOTHYROIDISM, UNSPECIFIED TYPE: Primary | ICD-10-CM

## 2018-06-14 LAB
T4 FREE: 1.2 NG/DL (ref 0.9–1.8)
TSH SERPL DL<=0.05 MIU/L-ACNC: 13.1 UIU/ML (ref 0.27–4.2)

## 2018-06-14 PROCEDURE — 99214 OFFICE O/P EST MOD 30 MIN: CPT | Performed by: NURSE PRACTITIONER

## 2018-06-14 RX ORDER — LEVOTHYROXINE SODIUM 137 UG/1
274 TABLET ORAL DAILY
Qty: 60 TABLET | Refills: 4 | Status: SHIPPED | OUTPATIENT
Start: 2018-06-14 | End: 2019-04-17 | Stop reason: DRUGHIGH

## 2018-06-14 RX ORDER — ARIPIPRAZOLE 5 MG
TABLET ORAL
Qty: 90 TABLET | Refills: 1 | Status: SHIPPED | OUTPATIENT
Start: 2018-06-14 | End: 2019-04-17 | Stop reason: ALTCHOICE

## 2018-06-14 RX ORDER — ZOLPIDEM TARTRATE 5 MG/1
5 TABLET ORAL NIGHTLY PRN
Qty: 30 TABLET | Refills: 1 | Status: SHIPPED | OUTPATIENT
Start: 2018-06-14 | End: 2020-06-10 | Stop reason: SDUPTHER

## 2018-06-22 ENCOUNTER — OFFICE VISIT (OUTPATIENT)
Dept: BARIATRICS/WEIGHT MGMT | Age: 38
End: 2018-06-22

## 2018-06-22 VITALS
WEIGHT: 315 LBS | OXYGEN SATURATION: 98 % | HEART RATE: 80 BPM | SYSTOLIC BLOOD PRESSURE: 132 MMHG | HEIGHT: 75 IN | DIASTOLIC BLOOD PRESSURE: 88 MMHG | RESPIRATION RATE: 16 BRPM | BODY MASS INDEX: 39.17 KG/M2

## 2018-06-22 DIAGNOSIS — K21.9 CHRONIC GERD: ICD-10-CM

## 2018-06-22 DIAGNOSIS — R06.83 SNORING: ICD-10-CM

## 2018-06-22 DIAGNOSIS — E66.01 MORBID OBESITY WITH BMI OF 40.0-44.9, ADULT (HCC): Primary | ICD-10-CM

## 2018-06-22 DIAGNOSIS — E78.2 MIXED HYPERLIPIDEMIA: ICD-10-CM

## 2018-06-22 DIAGNOSIS — E55.9 VITAMIN D DEFICIENCY: ICD-10-CM

## 2018-06-22 PROCEDURE — 99213 OFFICE O/P EST LOW 20 MIN: CPT | Performed by: NURSE PRACTITIONER

## 2018-06-22 ASSESSMENT — ENCOUNTER SYMPTOMS
GASTROINTESTINAL NEGATIVE: 1
RESPIRATORY NEGATIVE: 1

## 2018-07-17 ENCOUNTER — INITIAL CONSULT (OUTPATIENT)
Dept: BARIATRICS/WEIGHT MGMT | Age: 38
End: 2018-07-17

## 2018-07-17 DIAGNOSIS — F33.41 RECURRENT MAJOR DEPRESSION IN PARTIAL REMISSION (HCC): Primary | ICD-10-CM

## 2018-07-17 DIAGNOSIS — E66.01 MORBID OBESITY WITH BMI OF 40.0-44.9, ADULT (HCC): ICD-10-CM

## 2018-07-17 PROCEDURE — 99999 PR OFFICE/OUTPT VISIT,PROCEDURE ONLY: CPT | Performed by: PSYCHOLOGIST

## 2018-07-17 PROCEDURE — 90791 PSYCH DIAGNOSTIC EVALUATION: CPT | Performed by: PSYCHOLOGIST

## 2018-07-17 NOTE — PROGRESS NOTES
perceived control over his eating urges. He acknowledge binge eating in the past when he was only eating 1 meal/day, but denies binge eating since he began to eat more regularly. He denies any history of purging behavior. Lianan Norton maintains a high level of functional activity working as a  for [employer], where he has been employed for two years. He currently resides with his wife of eight years and their two children, ages 6and 10years old. Lianna Norton completed the MBMD as part of the evaluation. His profile is valid. Results indicate eating and smoking as possible problem areas, and inactivity as a likely problem area at this time. There is no indication of acute psychiatric distress, including anxiety, depression, emotional lability, or cognitive dysfunction. His profile is characterized by a poor self-image and social withdrawal. Because he prefers solitary activities, he affords himself few opportunities for interpersonal success to break the established pattern of isolation and low self-esteem. As a medical patient, he may be reluctant to report symptoms and seek treatment for fear of being humiliated. Providers should be aware that while his adherence to a long-term treatment regimen is likely to be erratic, this is due to his own diminished sense of self-efficacy rather than any specific resistance toward his providers or the treatment plan. Patience, genuine attention, and a willingness to be tested for sincerity are all advisable in a regular follow-up plan. Mr. Ankit Howe endorsed more somatic concerns than the typical medical patient. The primary coping asset reflected in his profile is openness to receiving feedback and/or discussing matters pertaining to his health.      Lianna Norton exhibited good awareness of the risks of bariatric surgery; however, he believes the benefits will outweigh the potential risks, as he hopes to minimize or reverse the effects of several medical concerns, including hyperlipidemia, GERD, dyspnea, and thyroid disease. He reports realistic expectations for the procedure, as his overall goals include improved health, reduced pain, increased activity with his family, and a goal weight of 200-220 lbs. He understands the need for permanent lifestyle change, including dietary modifications and a regular exercise program, and expressed confidence in his ability to implement the necessary changes. He expressed a commitment to comply with treatment recommendations through this office. He identified his wife, who is also pursuing bariatric surgery, as a good support system in his efforts to meet his weight management goals. In summary, Austin Bonilla is considered to be an appropriate candidate for bariatric surgery from a psychological standpoint, provided he maintains adequate management of his depressive symptoms and demonstrates the ability to effectively implement and sustain presurgical dietary recommendations. He was encouraged to participate in support group activities through [facility], and to consult with our staff and his family physician should he experience any significant post-surgical mood changes or have difficulty modifying his eating behaviors. Feel free to consult with me as needed with any further questions regarding this evaluation.

## 2018-07-25 ENCOUNTER — OFFICE VISIT (OUTPATIENT)
Dept: BARIATRICS/WEIGHT MGMT | Age: 38
End: 2018-07-25

## 2018-07-25 ENCOUNTER — TELEPHONE (OUTPATIENT)
Dept: CARDIOLOGY CLINIC | Age: 38
End: 2018-07-25

## 2018-07-25 VITALS
WEIGHT: 315 LBS | SYSTOLIC BLOOD PRESSURE: 127 MMHG | OXYGEN SATURATION: 98 % | DIASTOLIC BLOOD PRESSURE: 83 MMHG | HEART RATE: 90 BPM | BODY MASS INDEX: 39.17 KG/M2 | HEIGHT: 75 IN

## 2018-07-25 DIAGNOSIS — E66.01 MORBID OBESITY WITH BMI OF 40.0-44.9, ADULT (HCC): Primary | ICD-10-CM

## 2018-07-25 DIAGNOSIS — E78.2 MIXED HYPERLIPIDEMIA: ICD-10-CM

## 2018-07-25 DIAGNOSIS — K21.9 CHRONIC GERD: ICD-10-CM

## 2018-07-25 PROCEDURE — 99213 OFFICE O/P EST LOW 20 MIN: CPT | Performed by: NURSE PRACTITIONER

## 2018-07-25 ASSESSMENT — ENCOUNTER SYMPTOMS
ALLERGIC/IMMUNOLOGIC NEGATIVE: 1
GASTROINTESTINAL NEGATIVE: 1
EYES NEGATIVE: 1
RESPIRATORY NEGATIVE: 1

## 2018-07-25 NOTE — TELEPHONE ENCOUNTER
Cardiac clearance letter placed in [facility]. LMOM, per HIPAA form can leave detailed message.  [facility]

## 2018-07-25 NOTE — PATIENT INSTRUCTIONS
unhealthy fats. Using cooking oil spray and spray butter products are also healthful changes that will aid in your weight loss. Foods high in sugar are often also high in calories and low in nutrients. Eating habits after surgery will have to be a permanent and long-term change. Eating habits are so ingrained that it can be difficult to change. It is important to practice new eating habits prior to surgery to mentally prepare yourself for the challenge ahead. Also remember that overall health, age, and genetics make each persons weight loss progress different. Do not compare your progress (pre or post-operatively), the amount you eat, or exercise to other patients. In addition, it is the responsibility of the patient to schedule and follow up on labs and tests completed during the presurgical period. Results will be reviewed at each visit. Patient received dietary handouts and education.     Plan/Recommendations:   - Meal plan/prep to avoid eating out  - Measure and decrease portion sizes  - Choose foods with 10 grams or less of sugar and 5 grams or less of fat  - Eliminate beer, sweet tea + deserts

## 2018-07-25 NOTE — TELEPHONE ENCOUNTER
Pt called requesting a letter of cardiac clearance be placed in his chart so that he can be viewed by Dr Darlene Mtz

## 2018-07-25 NOTE — TELEPHONE ENCOUNTER
Yes, he can proceed with surgery. He is considered lower risk clinically for intermediate risk type of surgery.

## 2018-08-23 ENCOUNTER — OFFICE VISIT (OUTPATIENT)
Dept: PULMONOLOGY | Age: 38
End: 2018-08-23

## 2018-08-23 VITALS
SYSTOLIC BLOOD PRESSURE: 128 MMHG | TEMPERATURE: 98 F | OXYGEN SATURATION: 96 % | HEART RATE: 74 BPM | RESPIRATION RATE: 18 BRPM | DIASTOLIC BLOOD PRESSURE: 84 MMHG | BODY MASS INDEX: 39.17 KG/M2 | WEIGHT: 315 LBS | HEIGHT: 75 IN

## 2018-08-23 DIAGNOSIS — E66.01 MORBID OBESITY (HCC): ICD-10-CM

## 2018-08-23 DIAGNOSIS — Z01.818 PRE-OPERATIVE CLEARANCE: ICD-10-CM

## 2018-08-23 DIAGNOSIS — G47.33 MILD OBSTRUCTIVE SLEEP APNEA: Primary | ICD-10-CM

## 2018-08-23 DIAGNOSIS — Z87.891 PERSONAL HISTORY OF SMOKING: ICD-10-CM

## 2018-08-23 PROCEDURE — 99214 OFFICE O/P EST MOD 30 MIN: CPT | Performed by: INTERNAL MEDICINE

## 2018-08-23 ASSESSMENT — SLEEP AND FATIGUE QUESTIONNAIRES
HOW LIKELY ARE YOU TO NOD OFF OR FALL ASLEEP WHILE SITTING QUIETLY AFTER LUNCH WITHOUT ALCOHOL: 0
ESS TOTAL SCORE: 3
HOW LIKELY ARE YOU TO NOD OFF OR FALL ASLEEP WHILE LYING DOWN TO REST IN THE AFTERNOON WHEN CIRCUMSTANCES PERMIT: 1
HOW LIKELY ARE YOU TO NOD OFF OR FALL ASLEEP WHILE SITTING AND READING: 0
HOW LIKELY ARE YOU TO NOD OFF OR FALL ASLEEP WHILE WATCHING TV: 1
HOW LIKELY ARE YOU TO NOD OFF OR FALL ASLEEP WHILE SITTING INACTIVE IN A PUBLIC PLACE: 0
HOW LIKELY ARE YOU TO NOD OFF OR FALL ASLEEP WHEN YOU ARE A PASSENGER IN A CAR FOR AN HOUR WITHOUT A BREAK: 1
HOW LIKELY ARE YOU TO NOD OFF OR FALL ASLEEP WHILE SITTING AND TALKING TO SOMEONE: 0
HOW LIKELY ARE YOU TO NOD OFF OR FALL ASLEEP IN A CAR, WHILE STOPPED FOR A FEW MINUTES IN TRAFFIC: 0
NECK CIRCUMFERENCE (INCHES): 18.5

## 2018-08-23 NOTE — PROGRESS NOTES
surgery  · 8 pack year smoking- quit 1/2018       Plan:       Advised to use CPAP 6-8 hrs at night and during naps. Replacement of mask, tubing, head straps every 3-6 months or sooner if damaged. Follow up CPAP compliance and pressure adjustment if needed  Sleep hygiene  Avoid sedatives, alcohol and caffeinated drinks at bed time. No driving motorized vehicles or operating heavy machinery while fatigue, drowsy or sleepy. Weight loss is also recommended as a long-term intervention. Complications of VANDANA if not treated were discussed with patient patient to include systemic hypertension, pulmonary hypertension, cardiovascular morbidities, car accidents and all cause mortality. Advised to continue with smoking cessation. Upon examination and discussion I have determined that, from a pulmonary standpoint, patient is clear for surgery. Patient has low preoperative pulmonary risk with 3.6% pulmonary complication rate. Perioperative pulmonary complications were discussed with the patient including atelectasis, infection and prolonged mechanical ventilation. I recommend:  1- Airway extubation (if patient requires intubation for surgery) performed only when the patient is fully awake and alert. 2- Removal of the endotracheal tube should take place in the operating room or PACU. 3- The patient should be maintained in the semiupright or lateral, not supine, position and should undergo continuous cardiorespiratory monitoring. 4- Immediate application of CPAP with or without a nasopharyngeal airway will help prevent upper airway collapse. 5- Systemic opioids should be used cautiously because of their ability to depress the respiratory drive  6- Benzodiazepines should be avoided because of their negative effects on the respiratory control and upper airway musculature.

## 2018-08-23 NOTE — LETTER
[facility] Pulmonary, Critical Care, and Sleep  [address], [address] [place] [address]  Phone: [phone]  Fax: [phone]    August 23, 2018     Patient: Austin Bonilla   MR Number: [id number]   YOB: 1980   Date of Visit: 8/23/2018     Dear Dr. Mi Ramirez: Thank you for the request for consultation for Regla Baron to me for the evaluation. Below are the relevant portions of my assessment and plan of care. Assessment:       · Mild VANDANA. CPAP 12 cm H2O. Optimal compliance and efficacy upon review today. · Snoring- resolved   · Morbid obesity  · Preoperative clearance for bariatric surgery  · 8 pack year smoking- quit 1/2018       Plan:       Advised to use CPAP 6-8 hrs at night and during naps. Replacement of mask, tubing, head straps every 3-6 months or sooner if damaged. Follow up CPAP compliance and pressure adjustment if needed  Sleep hygiene  Avoid sedatives, alcohol and caffeinated drinks at bed time. No driving motorized vehicles or operating heavy machinery while fatigue, drowsy or sleepy. Weight loss is also recommended as a long-term intervention. Complications of VANDANA if not treated were discussed with patient patient to include systemic hypertension, pulmonary hypertension, cardiovascular morbidities, car accidents and all cause mortality. Advised to continue with smoking cessation. Upon examination and discussion I have determined that, from a pulmonary standpoint, patient is clear for surgery. Patient has low preoperative pulmonary risk with 3.0% pulmonary complication rate. Perioperative pulmonary complications were discussed with the patient including atelectasis, infection and prolonged mechanical ventilation. I recommend:  1- Airway extubation (if patient requires intubation for surgery) performed only when the patient is fully awake and alert. 2- Removal of the endotracheal tube should take place in the operating room or PACU. 3- The patient should be maintained in the semiupright or lateral, not supine, position and should undergo continuous cardiorespiratory monitoring. 4- Immediate application of CPAP with or without a nasopharyngeal airway will help prevent upper airway collapse. 5- Systemic opioids should be used cautiously because of their ability to depress the respiratory drive  6- Benzodiazepines should be avoided because of their negative effects on the respiratory control and upper airway musculature. If you have questions, please do not hesitate to call me. I look forward to following Deonte Burris along with you.     Sincerely,        Le Arciniega MD

## 2018-08-27 ENCOUNTER — OFFICE VISIT (OUTPATIENT)
Dept: BARIATRICS/WEIGHT MGMT | Age: 38
End: 2018-08-27

## 2018-08-27 VITALS
HEIGHT: 75 IN | BODY MASS INDEX: 39.17 KG/M2 | SYSTOLIC BLOOD PRESSURE: 134 MMHG | HEART RATE: 80 BPM | DIASTOLIC BLOOD PRESSURE: 81 MMHG | OXYGEN SATURATION: 99 % | WEIGHT: 315 LBS

## 2018-08-27 DIAGNOSIS — E66.01 MORBID OBESITY WITH BMI OF 40.0-44.9, ADULT (HCC): Primary | ICD-10-CM

## 2018-08-27 DIAGNOSIS — E78.2 MIXED HYPERLIPIDEMIA: ICD-10-CM

## 2018-08-27 DIAGNOSIS — K21.9 CHRONIC GERD: ICD-10-CM

## 2018-08-27 DIAGNOSIS — G47.33 OBSTRUCTIVE SLEEP APNEA: ICD-10-CM

## 2018-08-27 PROCEDURE — 99213 OFFICE O/P EST LOW 20 MIN: CPT | Performed by: NURSE PRACTITIONER

## 2018-08-27 ASSESSMENT — ENCOUNTER SYMPTOMS
GASTROINTESTINAL NEGATIVE: 1
EYES NEGATIVE: 1
RESPIRATORY NEGATIVE: 1
ALLERGIC/IMMUNOLOGIC NEGATIVE: 1

## 2018-08-27 NOTE — PROGRESS NOTES
Jacqueline Patel lost 3 lbs over past month. He typically eats 4-5 x day, but yesterday only at 3 x. Breakfast: 2 scrambled eggs with white toast with white milk (not sure of %)     Snack: not usually     Lunch: ham sandwich with cheese stick and water     Snack: not usually OR cheese stick OR granola bar     Dinner: grilled chicken and macaroni and cheese     Snack: cheese stick OR granola bar     Fluids: water, no coffee/tea/beer 1-2% milk, flavored water, shane    Is pt consuming smaller portions? yes    Is pt consuming at least 64 oz of fluids per day? yes    Is pt consuming carbonated, caffeinated, or sugary beverages? no    Has pt sampled Unjury and/or Nectar protein? Yes    Exercise: not usually- at work he walks 10-15 K steps/day     Plan/Recommendations: check milk %, switch granola bar to protein bar, discussed that he may not tolerate high fat foods such as mac and cheese after surgery, continue to eat 4 x day.      Handouts: none     Priscilla Palma

## 2018-08-27 NOTE — PROGRESS NOTES
[facility]) Physicians   [facility]    Subjective:      Patient ID: Jo Garcia is a 43 y.o. male    HPI    The patient is here for their bariatric surgery presurgical visit. He has made several attempts at weight loss in the past without success and now wishes to pursue bariatric surgery. He is working to change his dietary behaviors and lose weight to improve comorbid conditions such as hyperlipidemia, obstructive sleep apnea and GERD. Jo Garcia is a very pleasant 43 y.o. male with Body mass index is 42.37 kg/m². Past Medical History:   Diagnosis Date    Depression     Irritable     Nephrolith 5/2010    Thyroid disease     hypothyroid - chronic lymphocytic thyroiditis     Past Surgical History:   Procedure Laterality Date    HERNIA REPAIR  4/5/16    ventral    THYROIDECTOMY, PARTIAL  3/15    UPPER GASTROINTESTINAL ENDOSCOPY  05/11/2018    VASECTOMY N/A 2012    WISDOM TOOTH EXTRACTION       Family History   Problem Relation Age of Onset    Depression Mother     Arthritis Father         RA    Cancer Sister         thyroid    Depression Sister     Cancer Paternal Grandfather         lung    Cancer Paternal Aunt         lung     Social History   Substance Use Topics    Smoking status: Former Smoker     Packs/day: 0.50     Years: 17.00     Types: Cigarettes     Quit date: 1/7/2018    Smokeless tobacco: Never Used    Alcohol use Yes      Comment: 2-3 beears a month     I counseled the patient on the importance of not smoking and risks of ETOH. Allergies   Allergen Reactions    Other Other (See Comments)     Hazelnuts, throat closes     Vitals:    08/27/18 1005   BP: 134/81   Pulse: 80   SpO2: 99%   Weight: (!) 339 lb (153.8 kg)   Height: 6' 3\" (1.905 m)     Body mass index is 42.37 kg/m².     Current Outpatient Prescriptions:     ABILIFY 5 MG tablet, TAKE ONE TABLET BY MOUTH ONCE DAILY, Disp: 90 tablet, Rfl: 1    levothyroxine (SYNTHROID) 137 MCG tablet, Take 2 tablets by mouth daily, Disp: 60 tablet, Rfl: 4    omeprazole (PRILOSEC) 20 MG delayed release capsule, Take 1 capsule by mouth Daily, Disp: 30 capsule, Rfl: 3    Review of Systems   Constitutional: Negative. HENT: Negative. Eyes: Negative. Respiratory: Negative. Cardiovascular: Negative. Gastrointestinal: Negative. Endocrine: Negative. Genitourinary: Negative. Musculoskeletal: Negative. Skin: Negative. Allergic/Immunologic: Negative. Neurological: Negative. Hematological: Negative. Psychiatric/Behavioral: Negative. Objective:   Physical Exam   Constitutional: He is oriented to person, place, and time. He appears well-developed and well-nourished. HENT:   Head: Normocephalic and atraumatic. Eyes: Pupils are equal, round, and reactive to light. Conjunctivae are normal.   Neck: Normal range of motion. Neck supple. Cardiovascular: Normal rate. Pulmonary/Chest: Effort normal.   Abdominal: Soft. Musculoskeletal: Normal range of motion. Neurological: He is alert and oriented to person, place, and time. Skin: Skin is warm and dry. Psychiatric: He has a normal mood and affect. His behavior is normal. Judgment and thought content normal.   Vitals reviewed. Assessment and Plan:   Patient is here for their 6th presurgery visit for sleeve, down 3 lbs. The patient's current Body mass index is 42.37 kg/m². (8/27/18). He is making dietary and behavior modifications. He did meet with the registered dietitian for continued follow up. I agree with recommendations and plan. He is not doing any intentional exercise, but has a job where he is on his feet the whole day getting in 10,000-15,000 steps per day. Encouraged physical activity as able. Discussed preop work up which is complete. Dietitian and I have spoken and agree the patient is ready for a surgical date.  Have cautioned patient about weight gain going forward and maintaining healthy behaviors as to not

## 2018-09-07 ENCOUNTER — TELEPHONE (OUTPATIENT)
Dept: BARIATRICS/WEIGHT MGMT | Age: 38
End: 2018-09-07

## 2018-09-11 ENCOUNTER — OFFICE VISIT (OUTPATIENT)
Dept: BARIATRICS/WEIGHT MGMT | Age: 38
End: 2018-09-11

## 2018-09-11 VITALS
SYSTOLIC BLOOD PRESSURE: 138 MMHG | HEART RATE: 86 BPM | DIASTOLIC BLOOD PRESSURE: 87 MMHG | WEIGHT: 315 LBS | HEIGHT: 75 IN | OXYGEN SATURATION: 98 % | BODY MASS INDEX: 39.17 KG/M2

## 2018-09-11 DIAGNOSIS — E66.01 MORBID OBESITY WITH BMI OF 40.0-44.9, ADULT (HCC): Primary | ICD-10-CM

## 2018-09-11 DIAGNOSIS — G47.33 OBSTRUCTIVE SLEEP APNEA: ICD-10-CM

## 2018-09-11 DIAGNOSIS — E78.2 MIXED HYPERLIPIDEMIA: ICD-10-CM

## 2018-09-11 DIAGNOSIS — K21.9 CHRONIC GERD: ICD-10-CM

## 2018-09-11 PROCEDURE — 99999 PR OFFICE/OUTPT VISIT,PROCEDURE ONLY: CPT | Performed by: SURGERY

## 2018-09-11 PROCEDURE — 99213 OFFICE O/P EST LOW 20 MIN: CPT | Performed by: NURSE PRACTITIONER

## 2018-09-11 ASSESSMENT — ENCOUNTER SYMPTOMS
SHORTNESS OF BREATH: 0
DIARRHEA: 0
ABDOMINAL PAIN: 0
COUGH: 0
EYES NEGATIVE: 1
NAUSEA: 0
RESPIRATORY NEGATIVE: 1
ALLERGIC/IMMUNOLOGIC NEGATIVE: 1
GASTROINTESTINAL NEGATIVE: 1

## 2018-09-11 NOTE — PROGRESS NOTES
1    levothyroxine (SYNTHROID) 137 MCG tablet, Take 2 tablets by mouth daily, Disp: 60 tablet, Rfl: 4    omeprazole (PRILOSEC) 20 MG delayed release capsule, Take 1 capsule by mouth Daily, Disp: 30 capsule, Rfl: 3    Review of Systems   Constitutional: Negative. Negative for chills and fever. HENT: Negative. Eyes: Negative. Respiratory: Negative. Negative for cough and shortness of breath. Cardiovascular: Negative. Gastrointestinal: Negative. Negative for abdominal pain, diarrhea and nausea. Endocrine: Negative. Genitourinary: Negative. Musculoskeletal: Negative. Skin: Negative. Allergic/Immunologic: Negative. Neurological: Negative. Hematological: Negative. Psychiatric/Behavioral: Negative. Objective:     Physical Exam   Constitutional: He is oriented to person, place, and time. He appears well-developed and well-nourished. HENT:   Head: Normocephalic and atraumatic. Eyes: Pupils are equal, round, and reactive to light. Conjunctivae are normal.   Neck: Normal range of motion. Neck supple. Cardiovascular: Normal rate and normal heart sounds. Pulmonary/Chest: Effort normal and breath sounds normal. No respiratory distress. He has no wheezes. He has no rales. Abdominal: Soft. Bowel sounds are normal. He exhibits no distension. There is no tenderness. There is no rebound. Musculoskeletal: Normal range of motion. Neurological: He is alert and oriented to person, place, and time. Skin: Skin is warm and dry. Psychiatric: He has a normal mood and affect. His speech is normal and behavior is normal. Judgment and thought content normal. Cognition and memory are normal.   Vitals reviewed. Assessment and Plan:   Patient is here for their preoperative education group visit for sleeve gastrectomy. The patient is up 3.5 lbs today. The patient's current Body mass index is 42.81 kg/m². (9/11/18).  He is making good dietary and behavior modifications and is and/or the dietitian.

## 2018-09-11 NOTE — PROGRESS NOTES
reflux, injury to surrounding structures, risks of anesthesia, stricture, delayed gastric emptying, staple line leak, incisional hernia, malnutrition , hair loss, and/ or Conversion to Open surgery may be necessary. Failure to lose or maintain weight loss, Gallstones or Kidney Stones, Deep Venous Thrombosis , pulmonary embolism and / or death. With obesity and/or Fatty liver , I may elect to perform liver core biopsy to have  Baseline idea on liver pathology if there is abnormal Liver Functions or if there is hepatomegaly &/or lesion, risks include but not limited to bile leak, liver hematoma or failure to diagnose a condition. I did explain thoroughly to the patient that compliance with pre- and post op diet and other recommendations are integral part to improve the chances of successful weight loss and also not following it could end with serious health complications. We discussed that our goal is to ameliorate the medical problems and not to obtain a specific body mass index. Patient understands the risks and benefits and wishes to proceed with the procedure. Also understands if BMI is lower than 40 without significant co morbid conditions, concerns for risks of surgery being somewhat higher over long run, however patient wants to proceed and fully understands the risks. Clearly BMI over 35 does impose very serious health risks as well chances of losing weight on diet only is very limited and sustaining weight loss is even less, thus surgery is certainly recommended for long term weight loss given compliance. I advised the patient that we can't guarantee final insurance approval.      Manuela Philip is aware if Weight gain occurs in pre-op period while on pre-operative diet or  non compliant with it , surgery will be canceled. Tesfaye Herrera was satisfied with the discussion we had and Tesfaye Herrera had no further questions at end of visit and wants to proceed with surgery.      1- Pre-operative work

## 2018-09-19 ENCOUNTER — HOSPITAL ENCOUNTER (OUTPATIENT)
Dept: OTHER | Age: 38
Discharge: HOME | End: 2018-09-19
Attending: SURGERY | Admitting: SURGERY

## 2018-09-19 LAB
A/G RATIO: 1.4 (ref 1.1–2.2)
ABO/RH: NORMAL
ALBUMIN SERPL-MCNC: 4.4 G/DL (ref 3.4–5)
ALP BLD-CCNC: 56 U/L (ref 40–129)
ALT SERPL-CCNC: 40 U/L (ref 10–40)
ANION GAP SERPL CALCULATED.3IONS-SCNC: 11 MMOL/L (ref 3–16)
ANTIBODY SCREEN: NORMAL
AST SERPL-CCNC: 27 U/L (ref 15–37)
BILIRUB SERPL-MCNC: 0.4 MG/DL (ref 0–1)
BUN BLDV-MCNC: 17 MG/DL (ref 7–20)
CALCIUM SERPL-MCNC: 9.5 MG/DL (ref 8.3–10.6)
CHLORIDE BLD-SCNC: 105 MMOL/L (ref 99–110)
CO2: 27 MMOL/L (ref 21–32)
CREAT SERPL-MCNC: 0.9 MG/DL (ref 0.9–1.3)
GFR AFRICAN AMERICAN: >60
GFR NON-AFRICAN AMERICAN: >60
GLOBULIN: 3.1 G/DL
GLUCOSE BLD-MCNC: 88 MG/DL (ref 70–99)
HCT VFR BLD CALC: 45 % (ref 40.5–52.5)
HEMOGLOBIN: 15.1 G/DL (ref 13.5–17.5)
MCH RBC QN AUTO: 29.7 PG (ref 26–34)
MCHC RBC AUTO-ENTMCNC: 33.6 G/DL (ref 31–36)
MCV RBC AUTO: 88.5 FL (ref 80–100)
PDW BLD-RTO: 14 % (ref 12.4–15.4)
PLATELET # BLD: 219 K/UL (ref 135–450)
PMV BLD AUTO: 9.4 FL (ref 5–10.5)
POTASSIUM SERPL-SCNC: 4.7 MMOL/L (ref 3.5–5.1)
RBC # BLD: 5.09 M/UL (ref 4.2–5.9)
SODIUM BLD-SCNC: 143 MMOL/L (ref 136–145)
TOTAL PROTEIN: 7.5 G/DL (ref 6.4–8.2)
WBC # BLD: 7.5 K/UL (ref 4–11)

## 2018-09-25 ENCOUNTER — OFFICE VISIT (OUTPATIENT)
Dept: FAMILY MEDICINE CLINIC | Age: 38
End: 2018-09-25
Payer: COMMERCIAL

## 2018-09-25 VITALS
HEIGHT: 75 IN | DIASTOLIC BLOOD PRESSURE: 78 MMHG | OXYGEN SATURATION: 96 % | SYSTOLIC BLOOD PRESSURE: 122 MMHG | HEART RATE: 84 BPM | WEIGHT: 315 LBS | TEMPERATURE: 98.6 F | BODY MASS INDEX: 39.17 KG/M2

## 2018-09-25 DIAGNOSIS — Z01.818 PREOP EXAMINATION: Primary | ICD-10-CM

## 2018-09-25 PROCEDURE — 99242 OFF/OP CONSLTJ NEW/EST SF 20: CPT | Performed by: NURSE PRACTITIONER

## 2018-09-25 NOTE — PROGRESS NOTES
HPI:   Bebe Davila is a 78year old male who presents for a MA (Medicare Advantage) Supervisit (Once per calendar year). Preventative Screening  Colonoscopy - yes, last 3 yrs ago. Done at [facility] in [facility].     [facility]  [phone]  Fax: [phone]   Pre-operative History and Physical      DIAGNOSIS:  Obesity Body mass index is 42.5 kg/m². PROCEDURE:  Laparoscopic sleeve gastrectomy-ethicon; possible liver biopsy      History Obtained From:  patient    HISTORY OF PRESENT ILLNESS:    The patient is a 43 y.o. male with significant past medical history of obesity, sleep apnea,  who presents for preop exam       Past Medical History:   Diagnosis Date    Depression     Irritable     Nephrolith 5/2010    Sleep apnea     uses c-pap    Thyroid disease     hypothyroid - chronic lymphocytic thyroiditis     Past Surgical History:   Procedure Laterality Date    HERNIA REPAIR  4/5/16    ventral    THYROIDECTOMY, PARTIAL  3/15    UPPER GASTROINTESTINAL ENDOSCOPY  05/11/2018    VASECTOMY N/A 2012    WISDOM TOOTH EXTRACTION       Current Outpatient Prescriptions   Medication Sig Dispense Refill    ABILIFY 5 MG tablet TAKE ONE TABLET BY MOUTH ONCE DAILY 90 tablet 1    levothyroxine (SYNTHROID) 137 MCG tablet Take 2 tablets by mouth daily 60 tablet 4    omeprazole (PRILOSEC) 20 MG delayed release capsule Take 1 capsule by mouth Daily 30 capsule 3     No current facility-administered medications for this visit. Allergies: Other  History of allergic reaction to anesthesia:  No     History   Smoking Status    Former Smoker    Packs/day: 0.50    Years: 17.00    Types: Cigarettes    Quit date: 1/7/2018   Smokeless Tobacco    Never Used     The patient states he drinks 0 per week.     Family History   Problem Relation Age of Onset    Depression Mother     Arthritis Father         RA    Cancer Sister         thyroid    Depression Sister     Cancer Paternal Grandfather         lung    Cancer Paternal Aunt         lung       REVIEW OF SYSTEMS:    CONSTITUTIONAL:  negative  EYES:  positive for  glasses  HEENT:  negative  RESPIRATORY:  negative  CARDIOVASCULAR:  negative  GASTROINTESTINAL: 4.8 - 11.0 k/cu mm   Red Cell Count 2.87 (L) 4.70 - 6.10 m/cu mm   Hemoglobin 9.6 (L) 14.0 - 18.0 gm/dL   Hematocrit 27.2 (L) 42.0 - 52.0 percent   MCV 94.8 (H) 80.0 - 94.0 fL   MCH 33.4 (H) 27.0 - 32.0 pg   MCHC 35.3 32.0 - 36.0 gm/dL   RDW 13.0 11.5 - 15 negative  GENITOURINARY:  negative  INTEGUMENT/BREAST:  negative  HEMATOLOGIC/LYMPHATIC:  negative  ALLERGIC/IMMUNOLOGIC:  positive for hazelnuts  ENDOCRINE:  positive for cold intolerance  MUSCULOSKELETAL:  negative  NEUROLOGICAL:  negative    PHYSICAL EXAM:      /78   Pulse 84   Temp 98.6 °F (37 °C)   Ht 6' 3\" (1.905 m)   Wt (!) 340 lb (154.2 kg)   SpO2 96%   BMI 42.50 kg/m²     CONSTITUTIONAL:  awake, alert, cooperative, no apparent distress, and appears stated age    Eyes:  Lids and lashes normal, pupils equal, round and reactive to light, extra ocular muscles intact, sclera clear, conjunctiva normal    Head/ENT:  Normocephalic, without obvious abnormality, atramatic, sinuses nontender on palpation, external ears without lesions, oral pharynx with moist mucus membranes, tonsils without erythema or exudates, gums normal and good dentition. Neck:  Supple, symmetrical, trachea midline, no adenopathy, thyroid symmetric, not enlarged and no tenderness, skin normal, No carotid bruit    Heart:  Normal apical impulse, regular rate and rhythm, normal S1 and S2, no S3 or S4, and no murmur noted    Lungs:  No increased work of breathing, good air exchange, clear to auscultation bilaterally, no crackles or wheezing    Abdomen:  No scars, normal bowel sounds, soft, non-distended, non-tender, no masses palpated, no hepatosplenomegally    Extremities:  No clubbing, cyanosis, or edema    NEUROLOGIC:  Awake, alert, oriented to name, place and time. Cranial nerves II-XII are grossly intact. Motor is 5 out of 5 bilaterally. ASSESSMENT AND PLAN:    1. Patient is a 43 y.o. male with above specified procedure planned on 10/8/18 with Dr. José Manuel Chambers at [facility]     2. Stop ASA/NSAIDS  medications 7-10 days prior to procedure.   3.Patient is cleared for surgery    [address], [address] [place] [address]  [phone] (over the last two weeks)?: Not at all  Feeling down, depressed, or hopeless (over the last two weeks)?: Not at all  PHQ-2 SCORE: 0     Advanced Directive:   He does NOT have a Living Will on file in [address] [place] [address] Rd.    Advance care planning including the explanation Results  Component Value Date    05/25/2018        CBC  (most recent labs)     Lab Results  Component Value Date   WBC 8.0 05/25/2018   HGB 9.6 05/25/2018        ALLERGIES:   He has No Known Allergies.     CURRENT MEDICATIONS:     Outpatient Prescrip He reports that he does not drink alcohol or use drugs. REVIEW OF SYSTEMS:     Constitutional: negative. Has no complaints or limitations, feels his usual self.    Eyes: negative  Ears, nose, mouth, throat, and face: negative  Respiratory: negative  Ca color, texture, turgor normal, no rashes or lesions   Lymph nodes: Cervical, supraclavicular, and axillary nodes normal   Neurologic: Normal        Vaccination History     Immunization History   Administered Date(s) Administered   • Pneumococcal (Prevnar 1 Vitamin D deficiency  Recent labs showed sufficient vit D. Stable. 11. Gastroesophageal reflux disease, esophagitis presence not specified  No active issues. Uses omeprazole. Stable.      12. CKD stage 4 secondary to hypertension ([facility] [address]  Noted on lab years Colonoscopy,10 Years due on 04/01/2001 Update Health Maintenance if applicable    Flex Sigmoidoscopy Screen every 10 years No results found for this or any previous visit. No flowsheet data found.      Fecal Occult Blood Annually No results found for: found for: DIGOXIN, DIG, VALP No flowsheet data found.

## 2018-10-08 ENCOUNTER — HOSPITAL ENCOUNTER (INPATIENT)
Age: 38
LOS: 2 days | Discharge: HOME | DRG: 621 | End: 2018-10-10
Attending: SURGERY | Admitting: SURGERY
Payer: COMMERCIAL

## 2018-10-08 ENCOUNTER — ANESTHESIA (OUTPATIENT)
Dept: OPERATING ROOM | Age: 38
DRG: 621 | End: 2018-10-08
Payer: COMMERCIAL

## 2018-10-08 ENCOUNTER — ANESTHESIA EVENT (OUTPATIENT)
Dept: OPERATING ROOM | Age: 38
DRG: 621 | End: 2018-10-08
Payer: COMMERCIAL

## 2018-10-08 VITALS
SYSTOLIC BLOOD PRESSURE: 128 MMHG | OXYGEN SATURATION: 100 % | RESPIRATION RATE: 9 BRPM | TEMPERATURE: 97.7 F | DIASTOLIC BLOOD PRESSURE: 63 MMHG

## 2018-10-08 DIAGNOSIS — Z98.84 S/P LAPAROSCOPIC SLEEVE GASTRECTOMY: Primary | ICD-10-CM

## 2018-10-08 LAB
ABO/RH: NORMAL
ANTIBODY SCREEN: NORMAL
GLUCOSE BLD-MCNC: 96 MG/DL (ref 70–99)
PERFORMED ON: NORMAL

## 2018-10-08 PROCEDURE — 94762 N-INVAS EAR/PLS OXIMTRY CONT: CPT

## 2018-10-08 PROCEDURE — 2580000003 HC RX 258: Performed by: SURGERY

## 2018-10-08 PROCEDURE — 6370000000 HC RX 637 (ALT 250 FOR IP): Performed by: SURGERY

## 2018-10-08 PROCEDURE — 2700000000 HC OXYGEN THERAPY PER DAY

## 2018-10-08 PROCEDURE — 3700000000 HC ANESTHESIA ATTENDED CARE: Performed by: SURGERY

## 2018-10-08 PROCEDURE — 0DB64Z3 EXCISION OF STOMACH, PERCUTANEOUS ENDOSCOPIC APPROACH, VERTICAL: ICD-10-PCS | Performed by: SURGERY

## 2018-10-08 PROCEDURE — 6360000002 HC RX W HCPCS: Performed by: SURGERY

## 2018-10-08 PROCEDURE — 3600000015 HC SURGERY LEVEL 5 ADDTL 15MIN: Performed by: SURGERY

## 2018-10-08 PROCEDURE — 2500000003 HC RX 250 WO HCPCS: Performed by: SURGERY

## 2018-10-08 PROCEDURE — 86901 BLOOD TYPING SEROLOGIC RH(D): CPT

## 2018-10-08 PROCEDURE — 7100000000 HC PACU RECOVERY - FIRST 15 MIN: Performed by: SURGERY

## 2018-10-08 PROCEDURE — 2709999900 HC NON-CHARGEABLE SUPPLY: Performed by: SURGERY

## 2018-10-08 PROCEDURE — 36415 COLL VENOUS BLD VENIPUNCTURE: CPT

## 2018-10-08 PROCEDURE — 6360000002 HC RX W HCPCS: Performed by: NURSE ANESTHETIST, CERTIFIED REGISTERED

## 2018-10-08 PROCEDURE — 7100000001 HC PACU RECOVERY - ADDTL 15 MIN: Performed by: SURGERY

## 2018-10-08 PROCEDURE — 3600000005 HC SURGERY LEVEL 5 BASE: Performed by: SURGERY

## 2018-10-08 PROCEDURE — 2780000010 HC IMPLANT OTHER: Performed by: SURGERY

## 2018-10-08 PROCEDURE — 3700000001 HC ADD 15 MINUTES (ANESTHESIA): Performed by: SURGERY

## 2018-10-08 PROCEDURE — 94770 HC ETCO2 MONITOR DAILY: CPT

## 2018-10-08 PROCEDURE — 86850 RBC ANTIBODY SCREEN: CPT

## 2018-10-08 PROCEDURE — 86900 BLOOD TYPING SEROLOGIC ABO: CPT

## 2018-10-08 PROCEDURE — 2580000003 HC RX 258: Performed by: NURSE ANESTHETIST, CERTIFIED REGISTERED

## 2018-10-08 PROCEDURE — 6360000002 HC RX W HCPCS

## 2018-10-08 PROCEDURE — 2500000003 HC RX 250 WO HCPCS: Performed by: NURSE ANESTHETIST, CERTIFIED REGISTERED

## 2018-10-08 PROCEDURE — 43775 LAP SLEEVE GASTRECTOMY: CPT | Performed by: SURGERY

## 2018-10-08 PROCEDURE — 1200000000 HC SEMI PRIVATE

## 2018-10-08 PROCEDURE — 2720000010 HC SURG SUPPLY STERILE: Performed by: SURGERY

## 2018-10-08 PROCEDURE — 88307 TISSUE EXAM BY PATHOLOGIST: CPT

## 2018-10-08 RX ORDER — DIPHENHYDRAMINE HYDROCHLORIDE 50 MG/ML
12.5 INJECTION INTRAMUSCULAR; INTRAVENOUS EVERY 6 HOURS PRN
Status: DISCONTINUED | OUTPATIENT
Start: 2018-10-08 | End: 2018-10-10 | Stop reason: HOSPADM

## 2018-10-08 RX ORDER — LIDOCAINE HYDROCHLORIDE 20 MG/ML
INJECTION, SOLUTION INFILTRATION; PERINEURAL PRN
Status: DISCONTINUED | OUTPATIENT
Start: 2018-10-08 | End: 2018-10-08 | Stop reason: SDUPTHER

## 2018-10-08 RX ORDER — NALOXONE HYDROCHLORIDE 0.4 MG/ML
0.4 INJECTION, SOLUTION INTRAMUSCULAR; INTRAVENOUS; SUBCUTANEOUS PRN
Status: DISCONTINUED | OUTPATIENT
Start: 2018-10-08 | End: 2018-10-09

## 2018-10-08 RX ORDER — MAGNESIUM HYDROXIDE 1200 MG/15ML
LIQUID ORAL
Status: COMPLETED | OUTPATIENT
Start: 2018-10-08 | End: 2018-10-08

## 2018-10-08 RX ORDER — DIPHENHYDRAMINE HYDROCHLORIDE 50 MG/ML
12.5 INJECTION INTRAMUSCULAR; INTRAVENOUS
Status: DISCONTINUED | OUTPATIENT
Start: 2018-10-08 | End: 2018-10-08 | Stop reason: HOSPADM

## 2018-10-08 RX ORDER — GLYCOPYRROLATE 0.2 MG/ML
INJECTION INTRAMUSCULAR; INTRAVENOUS PRN
Status: DISCONTINUED | OUTPATIENT
Start: 2018-10-08 | End: 2018-10-08 | Stop reason: SDUPTHER

## 2018-10-08 RX ORDER — SODIUM CHLORIDE, SODIUM LACTATE, POTASSIUM CHLORIDE, CALCIUM CHLORIDE 600; 310; 30; 20 MG/100ML; MG/100ML; MG/100ML; MG/100ML
INJECTION, SOLUTION INTRAVENOUS CONTINUOUS
Status: DISCONTINUED | OUTPATIENT
Start: 2018-10-08 | End: 2018-10-09

## 2018-10-08 RX ORDER — MAGNESIUM HYDROXIDE 1200 MG/15ML
LIQUID ORAL CONTINUOUS PRN
Status: COMPLETED | OUTPATIENT
Start: 2018-10-08 | End: 2018-10-08

## 2018-10-08 RX ORDER — LABETALOL HYDROCHLORIDE 5 MG/ML
5 INJECTION, SOLUTION INTRAVENOUS EVERY 10 MIN PRN
Status: DISCONTINUED | OUTPATIENT
Start: 2018-10-08 | End: 2018-10-08 | Stop reason: HOSPADM

## 2018-10-08 RX ORDER — PANTOPRAZOLE SODIUM 40 MG/10ML
40 INJECTION, POWDER, LYOPHILIZED, FOR SOLUTION INTRAVENOUS DAILY
Status: DISCONTINUED | OUTPATIENT
Start: 2018-10-08 | End: 2018-10-10 | Stop reason: HOSPADM

## 2018-10-08 RX ORDER — SODIUM CHLORIDE 9 MG/ML
INJECTION, SOLUTION INTRAVENOUS CONTINUOUS
Status: DISCONTINUED | OUTPATIENT
Start: 2018-10-08 | End: 2018-10-10

## 2018-10-08 RX ORDER — 0.9 % SODIUM CHLORIDE 0.9 %
10 VIAL (ML) INJECTION DAILY
Status: DISCONTINUED | OUTPATIENT
Start: 2018-10-08 | End: 2018-10-10 | Stop reason: HOSPADM

## 2018-10-08 RX ORDER — LIDOCAINE 50 MG/G
1 PATCH TOPICAL DAILY
Status: DISCONTINUED | OUTPATIENT
Start: 2018-10-08 | End: 2018-10-10 | Stop reason: HOSPADM

## 2018-10-08 RX ORDER — SODIUM CHLORIDE 0.9 % (FLUSH) 0.9 %
10 SYRINGE (ML) INJECTION EVERY 12 HOURS SCHEDULED
Status: DISCONTINUED | OUTPATIENT
Start: 2018-10-08 | End: 2018-10-08 | Stop reason: HOSPADM

## 2018-10-08 RX ORDER — PROPOFOL 10 MG/ML
INJECTION, EMULSION INTRAVENOUS PRN
Status: DISCONTINUED | OUTPATIENT
Start: 2018-10-08 | End: 2018-10-08 | Stop reason: SDUPTHER

## 2018-10-08 RX ORDER — SODIUM CHLORIDE 9 MG/ML
INJECTION, SOLUTION INTRAVENOUS CONTINUOUS PRN
Status: DISCONTINUED | OUTPATIENT
Start: 2018-10-08 | End: 2018-10-08 | Stop reason: SDUPTHER

## 2018-10-08 RX ORDER — SODIUM CHLORIDE 0.9 % (FLUSH) 0.9 %
10 SYRINGE (ML) INJECTION EVERY 12 HOURS SCHEDULED
Status: DISCONTINUED | OUTPATIENT
Start: 2018-10-08 | End: 2018-10-10 | Stop reason: HOSPADM

## 2018-10-08 RX ORDER — ACETAMINOPHEN 10 MG/ML
1000 INJECTION, SOLUTION INTRAVENOUS EVERY 8 HOURS
Status: COMPLETED | OUTPATIENT
Start: 2018-10-08 | End: 2018-10-09

## 2018-10-08 RX ORDER — HYDROMORPHONE HCL 110MG/55ML
PATIENT CONTROLLED ANALGESIA SYRINGE INTRAVENOUS PRN
Status: DISCONTINUED | OUTPATIENT
Start: 2018-10-08 | End: 2018-10-08 | Stop reason: SDUPTHER

## 2018-10-08 RX ORDER — HYDROMORPHONE HCL 110MG/55ML
0.25 PATIENT CONTROLLED ANALGESIA SYRINGE INTRAVENOUS EVERY 5 MIN PRN
Status: DISCONTINUED | OUTPATIENT
Start: 2018-10-08 | End: 2018-10-08 | Stop reason: HOSPADM

## 2018-10-08 RX ORDER — SODIUM CHLORIDE 0.9 % (FLUSH) 0.9 %
10 SYRINGE (ML) INJECTION PRN
Status: DISCONTINUED | OUTPATIENT
Start: 2018-10-08 | End: 2018-10-08 | Stop reason: HOSPADM

## 2018-10-08 RX ORDER — PROMETHAZINE HYDROCHLORIDE 25 MG/ML
6.25 INJECTION, SOLUTION INTRAMUSCULAR; INTRAVENOUS PRN
Status: DISCONTINUED | OUTPATIENT
Start: 2018-10-08 | End: 2018-10-08 | Stop reason: HOSPADM

## 2018-10-08 RX ORDER — NEOSTIGMINE METHYLSULFATE 5 MG/5 ML
SYRINGE (ML) INTRAVENOUS PRN
Status: DISCONTINUED | OUTPATIENT
Start: 2018-10-08 | End: 2018-10-08 | Stop reason: SDUPTHER

## 2018-10-08 RX ORDER — DEXAMETHASONE SODIUM PHOSPHATE 4 MG/ML
INJECTION, SOLUTION INTRA-ARTICULAR; INTRALESIONAL; INTRAMUSCULAR; INTRAVENOUS; SOFT TISSUE PRN
Status: DISCONTINUED | OUTPATIENT
Start: 2018-10-08 | End: 2018-10-08 | Stop reason: SDUPTHER

## 2018-10-08 RX ORDER — SUCCINYLCHOLINE/SOD CL,ISO/PF 100 MG/5ML
SYRINGE (ML) INTRAVENOUS PRN
Status: DISCONTINUED | OUTPATIENT
Start: 2018-10-08 | End: 2018-10-08 | Stop reason: SDUPTHER

## 2018-10-08 RX ORDER — HYDROMORPHONE HCL 110MG/55ML
0.5 PATIENT CONTROLLED ANALGESIA SYRINGE INTRAVENOUS EVERY 5 MIN PRN
Status: DISCONTINUED | OUTPATIENT
Start: 2018-10-08 | End: 2018-10-08 | Stop reason: HOSPADM

## 2018-10-08 RX ORDER — HYDRALAZINE HYDROCHLORIDE 20 MG/ML
10 INJECTION INTRAMUSCULAR; INTRAVENOUS
Status: DISCONTINUED | OUTPATIENT
Start: 2018-10-08 | End: 2018-10-10 | Stop reason: HOSPADM

## 2018-10-08 RX ORDER — FENTANYL CITRATE 50 UG/ML
INJECTION, SOLUTION INTRAMUSCULAR; INTRAVENOUS PRN
Status: DISCONTINUED | OUTPATIENT
Start: 2018-10-08 | End: 2018-10-08 | Stop reason: SDUPTHER

## 2018-10-08 RX ORDER — VECURONIUM BROMIDE 1 MG/ML
INJECTION, POWDER, LYOPHILIZED, FOR SOLUTION INTRAVENOUS PRN
Status: DISCONTINUED | OUTPATIENT
Start: 2018-10-08 | End: 2018-10-08 | Stop reason: SDUPTHER

## 2018-10-08 RX ORDER — METOCLOPRAMIDE HYDROCHLORIDE 5 MG/ML
10 INJECTION INTRAMUSCULAR; INTRAVENOUS EVERY 6 HOURS PRN
Status: DISCONTINUED | OUTPATIENT
Start: 2018-10-08 | End: 2018-10-10 | Stop reason: HOSPADM

## 2018-10-08 RX ORDER — SODIUM CHLORIDE 0.9 % (FLUSH) 0.9 %
10 SYRINGE (ML) INJECTION PRN
Status: DISCONTINUED | OUTPATIENT
Start: 2018-10-08 | End: 2018-10-10 | Stop reason: HOSPADM

## 2018-10-08 RX ORDER — OXYCODONE HYDROCHLORIDE 5 MG/1
10 TABLET ORAL PRN
Status: DISCONTINUED | OUTPATIENT
Start: 2018-10-08 | End: 2018-10-08 | Stop reason: HOSPADM

## 2018-10-08 RX ORDER — APREPITANT 80 MG/1
80 CAPSULE ORAL ONCE
Status: COMPLETED | OUTPATIENT
Start: 2018-10-08 | End: 2018-10-08

## 2018-10-08 RX ORDER — SCOLOPAMINE TRANSDERMAL SYSTEM 1 MG/1
1 PATCH, EXTENDED RELEASE TRANSDERMAL ONCE
Status: DISCONTINUED | OUTPATIENT
Start: 2018-10-08 | End: 2018-10-09

## 2018-10-08 RX ORDER — ONDANSETRON 2 MG/ML
INJECTION INTRAMUSCULAR; INTRAVENOUS PRN
Status: DISCONTINUED | OUTPATIENT
Start: 2018-10-08 | End: 2018-10-08 | Stop reason: SDUPTHER

## 2018-10-08 RX ORDER — PROMETHAZINE HYDROCHLORIDE 25 MG/ML
6.25 INJECTION, SOLUTION INTRAMUSCULAR; INTRAVENOUS EVERY 6 HOURS PRN
Status: DISCONTINUED | OUTPATIENT
Start: 2018-10-08 | End: 2018-10-10 | Stop reason: HOSPADM

## 2018-10-08 RX ORDER — OXYCODONE HYDROCHLORIDE 5 MG/1
5 TABLET ORAL PRN
Status: DISCONTINUED | OUTPATIENT
Start: 2018-10-08 | End: 2018-10-08 | Stop reason: HOSPADM

## 2018-10-08 RX ORDER — LABETALOL HYDROCHLORIDE 5 MG/ML
10 INJECTION, SOLUTION INTRAVENOUS
Status: DISCONTINUED | OUTPATIENT
Start: 2018-10-08 | End: 2018-10-10 | Stop reason: HOSPADM

## 2018-10-08 RX ORDER — BUPIVACAINE HYDROCHLORIDE AND EPINEPHRINE 2.5; 5 MG/ML; UG/ML
INJECTION, SOLUTION EPIDURAL; INFILTRATION; INTRACAUDAL; PERINEURAL
Status: COMPLETED | OUTPATIENT
Start: 2018-10-08 | End: 2018-10-08

## 2018-10-08 RX ORDER — FENTANYL CITRATE 50 UG/ML
50 INJECTION, SOLUTION INTRAMUSCULAR; INTRAVENOUS EVERY 5 MIN PRN
Status: DISCONTINUED | OUTPATIENT
Start: 2018-10-08 | End: 2018-10-08 | Stop reason: HOSPADM

## 2018-10-08 RX ORDER — SODIUM CHLORIDE, SODIUM LACTATE, POTASSIUM CHLORIDE, CALCIUM CHLORIDE 600; 310; 30; 20 MG/100ML; MG/100ML; MG/100ML; MG/100ML
INJECTION, SOLUTION INTRAVENOUS CONTINUOUS PRN
Status: DISCONTINUED | OUTPATIENT
Start: 2018-10-08 | End: 2018-10-08 | Stop reason: SDUPTHER

## 2018-10-08 RX ORDER — SODIUM CHLORIDE, SODIUM LACTATE, POTASSIUM CHLORIDE, CALCIUM CHLORIDE 600; 310; 30; 20 MG/100ML; MG/100ML; MG/100ML; MG/100ML
INJECTION, SOLUTION INTRAVENOUS CONTINUOUS
Status: DISCONTINUED | OUTPATIENT
Start: 2018-10-08 | End: 2018-10-08

## 2018-10-08 RX ORDER — METHYLENE BLUE 10 MG/ML
INJECTION INTRAVENOUS
Status: COMPLETED | OUTPATIENT
Start: 2018-10-08 | End: 2018-10-08

## 2018-10-08 RX ORDER — MEPERIDINE HYDROCHLORIDE 25 MG/ML
12.5 INJECTION INTRAMUSCULAR; INTRAVENOUS; SUBCUTANEOUS EVERY 5 MIN PRN
Status: DISCONTINUED | OUTPATIENT
Start: 2018-10-08 | End: 2018-10-08 | Stop reason: HOSPADM

## 2018-10-08 RX ORDER — LIDOCAINE HYDROCHLORIDE 10 MG/ML
0.5 INJECTION, SOLUTION EPIDURAL; INFILTRATION; INTRACAUDAL; PERINEURAL ONCE
Status: DISCONTINUED | OUTPATIENT
Start: 2018-10-08 | End: 2018-10-08 | Stop reason: HOSPADM

## 2018-10-08 RX ORDER — SCOLOPAMINE TRANSDERMAL SYSTEM 1 MG/1
1 PATCH, EXTENDED RELEASE TRANSDERMAL
Status: DISCONTINUED | OUTPATIENT
Start: 2018-10-11 | End: 2018-10-10 | Stop reason: HOSPADM

## 2018-10-08 RX ORDER — MIDAZOLAM HYDROCHLORIDE 1 MG/ML
INJECTION INTRAMUSCULAR; INTRAVENOUS PRN
Status: DISCONTINUED | OUTPATIENT
Start: 2018-10-08 | End: 2018-10-08 | Stop reason: SDUPTHER

## 2018-10-08 RX ORDER — ONDANSETRON 2 MG/ML
4 INJECTION INTRAMUSCULAR; INTRAVENOUS EVERY 6 HOURS PRN
Status: DISCONTINUED | OUTPATIENT
Start: 2018-10-08 | End: 2018-10-10 | Stop reason: HOSPADM

## 2018-10-08 RX ADMIN — ENOXAPARIN SODIUM 40 MG: 40 INJECTION SUBCUTANEOUS at 09:23

## 2018-10-08 RX ADMIN — Medication 10 MG: at 15:35

## 2018-10-08 RX ADMIN — FENTANYL CITRATE 50 MCG: 50 INJECTION, SOLUTION INTRAMUSCULAR; INTRAVENOUS at 13:33

## 2018-10-08 RX ADMIN — DEXAMETHASONE SODIUM PHOSPHATE 10 MG: 4 INJECTION, SOLUTION INTRAMUSCULAR; INTRAVENOUS at 13:40

## 2018-10-08 RX ADMIN — SODIUM CHLORIDE, POTASSIUM CHLORIDE, SODIUM LACTATE AND CALCIUM CHLORIDE: 600; 310; 30; 20 INJECTION, SOLUTION INTRAVENOUS at 16:00

## 2018-10-08 RX ADMIN — GLUCAGON HYDROCHLORIDE 1 MG: KIT at 14:25

## 2018-10-08 RX ADMIN — SODIUM CHLORIDE, POTASSIUM CHLORIDE, SODIUM LACTATE AND CALCIUM CHLORIDE: 600; 310; 30; 20 INJECTION, SOLUTION INTRAVENOUS at 23:44

## 2018-10-08 RX ADMIN — CEFAZOLIN 3 G: 10 INJECTION, POWDER, FOR SOLUTION INTRAVENOUS; PARENTERAL at 23:55

## 2018-10-08 RX ADMIN — SODIUM CHLORIDE, POTASSIUM CHLORIDE, SODIUM LACTATE AND CALCIUM CHLORIDE: 600; 310; 30; 20 INJECTION, SOLUTION INTRAVENOUS at 20:00

## 2018-10-08 RX ADMIN — PROPOFOL 200 MG: 10 INJECTION, EMULSION INTRAVENOUS at 13:35

## 2018-10-08 RX ADMIN — Medication 4 MG: at 15:02

## 2018-10-08 RX ADMIN — SODIUM CHLORIDE, POTASSIUM CHLORIDE, SODIUM LACTATE AND CALCIUM CHLORIDE: 600; 310; 30; 20 INJECTION, SOLUTION INTRAVENOUS at 13:45

## 2018-10-08 RX ADMIN — APREPITANT 80 MG: 80 CAPSULE ORAL at 09:24

## 2018-10-08 RX ADMIN — FENTANYL CITRATE 50 MCG: 50 INJECTION, SOLUTION INTRAMUSCULAR; INTRAVENOUS at 14:35

## 2018-10-08 RX ADMIN — SODIUM CHLORIDE, POTASSIUM CHLORIDE, SODIUM LACTATE AND CALCIUM CHLORIDE: 600; 310; 30; 20 INJECTION, SOLUTION INTRAVENOUS at 09:24

## 2018-10-08 RX ADMIN — SODIUM CHLORIDE: 9 INJECTION, SOLUTION INTRAVENOUS at 12:16

## 2018-10-08 RX ADMIN — VECURONIUM BROMIDE 8 MG: 1 INJECTION, POWDER, LYOPHILIZED, FOR SOLUTION INTRAVENOUS at 13:47

## 2018-10-08 RX ADMIN — MIDAZOLAM HYDROCHLORIDE 2 MG: 1 INJECTION, SOLUTION INTRAMUSCULAR; INTRAVENOUS at 13:27

## 2018-10-08 RX ADMIN — SODIUM CHLORIDE, POTASSIUM CHLORIDE, SODIUM LACTATE AND CALCIUM CHLORIDE: 600; 310; 30; 20 INJECTION, SOLUTION INTRAVENOUS at 13:15

## 2018-10-08 RX ADMIN — HYDROMORPHONE HYDROCHLORIDE 1 MG: 2 INJECTION, SOLUTION INTRAMUSCULAR; INTRAVENOUS; SUBCUTANEOUS at 14:00

## 2018-10-08 RX ADMIN — GLYCOPYRROLATE 0.8 MG: 0.2 INJECTION INTRAMUSCULAR; INTRAVENOUS at 15:02

## 2018-10-08 RX ADMIN — Medication 180 MG: at 13:36

## 2018-10-08 RX ADMIN — ONDANSETRON 4 MG: 2 INJECTION INTRAMUSCULAR; INTRAVENOUS at 13:40

## 2018-10-08 RX ADMIN — LIDOCAINE HYDROCHLORIDE 100 MG: 20 INJECTION, SOLUTION INFILTRATION; PERINEURAL at 13:33

## 2018-10-08 RX ADMIN — ACETAMINOPHEN 1000 MG: 10 INJECTION, SOLUTION INTRAVENOUS at 17:15

## 2018-10-08 RX ADMIN — Medication 3 G: at 13:26

## 2018-10-08 RX ADMIN — FENTANYL CITRATE 50 MCG: 50 INJECTION, SOLUTION INTRAMUSCULAR; INTRAVENOUS at 13:55

## 2018-10-08 ASSESSMENT — PAIN DESCRIPTION - LOCATION
LOCATION: ABDOMEN

## 2018-10-08 ASSESSMENT — PULMONARY FUNCTION TESTS
PIF_VALUE: 28
PIF_VALUE: 22
PIF_VALUE: 29
PIF_VALUE: 26
PIF_VALUE: 28
PIF_VALUE: 26
PIF_VALUE: 23
PIF_VALUE: 22
PIF_VALUE: 22
PIF_VALUE: 28
PIF_VALUE: 21
PIF_VALUE: 21
PIF_VALUE: 20
PIF_VALUE: 26
PIF_VALUE: 25
PIF_VALUE: 16
PIF_VALUE: 23
PIF_VALUE: 24
PIF_VALUE: 21
PIF_VALUE: 25
PIF_VALUE: 25
PIF_VALUE: 28
PIF_VALUE: 1
PIF_VALUE: 0
PIF_VALUE: 1
PIF_VALUE: 28
PIF_VALUE: 25
PIF_VALUE: 28
PIF_VALUE: 19
PIF_VALUE: 21
PIF_VALUE: 23
PIF_VALUE: 28
PIF_VALUE: 26
PIF_VALUE: 24
PIF_VALUE: 26
PIF_VALUE: 21
PIF_VALUE: 21
PIF_VALUE: 31
PIF_VALUE: 28
PIF_VALUE: 25
PIF_VALUE: 30
PIF_VALUE: 26
PIF_VALUE: 28
PIF_VALUE: 30
PIF_VALUE: 28
PIF_VALUE: 22
PIF_VALUE: 26
PIF_VALUE: 26
PIF_VALUE: 22
PIF_VALUE: 23
PIF_VALUE: 24
PIF_VALUE: 21
PIF_VALUE: 28
PIF_VALUE: 27
PIF_VALUE: 21
PIF_VALUE: 26
PIF_VALUE: 28
PIF_VALUE: 28
PIF_VALUE: 26
PIF_VALUE: 27
PIF_VALUE: 19
PIF_VALUE: 28
PIF_VALUE: 26
PIF_VALUE: 24
PIF_VALUE: 27
PIF_VALUE: 17
PIF_VALUE: 31
PIF_VALUE: 1
PIF_VALUE: 22
PIF_VALUE: 19
PIF_VALUE: 26
PIF_VALUE: 26
PIF_VALUE: 28
PIF_VALUE: 26
PIF_VALUE: 31
PIF_VALUE: 27
PIF_VALUE: 19
PIF_VALUE: 21
PIF_VALUE: 23
PIF_VALUE: 29
PIF_VALUE: 28
PIF_VALUE: 8
PIF_VALUE: 26
PIF_VALUE: 0
PIF_VALUE: 29
PIF_VALUE: 28
PIF_VALUE: 26
PIF_VALUE: 22
PIF_VALUE: 23
PIF_VALUE: 27
PIF_VALUE: 29
PIF_VALUE: 3
PIF_VALUE: 26
PIF_VALUE: 27
PIF_VALUE: 22
PIF_VALUE: 26
PIF_VALUE: 22
PIF_VALUE: 1
PIF_VALUE: 20
PIF_VALUE: 22
PIF_VALUE: 1
PIF_VALUE: 6
PIF_VALUE: 24

## 2018-10-08 ASSESSMENT — PAIN SCALES - GENERAL
PAINLEVEL_OUTOF10: 7
PAINLEVEL_OUTOF10: 6
PAINLEVEL_OUTOF10: 7
PAINLEVEL_OUTOF10: 7

## 2018-10-08 ASSESSMENT — PAIN DESCRIPTION - PAIN TYPE
TYPE: SURGICAL PAIN

## 2018-10-08 ASSESSMENT — ENCOUNTER SYMPTOMS: SHORTNESS OF BREATH: 1

## 2018-10-08 ASSESSMENT — PAIN - FUNCTIONAL ASSESSMENT: PAIN_FUNCTIONAL_ASSESSMENT: 0-10

## 2018-10-08 NOTE — OP NOTE
injected under pressure confirming No obstruction (flow noted to duodenum) and No leak. The abdomen was carefully inspected and there was no bleeding or any other abnormality. The stomach was brought out through the RUQ incision. Hemostasis was confirmed. Snow applied along suture line and/or biopsy sites to ensure hemostasis. The 15 and 12 port sites was closed using 0.0 Vicryl  suture at the level of the fascia and that was done under direct vision with the laparoscope to ensure proper closure and nothing entrapped. Local Anesthetic used at port sites. The trocar site skin wounds were closed using 4.0 Vicryl after copious Irrigation of the wounds. Dermabond / or steri strips applied. Instrument, sponge, and needle counts were correct at the conclusion of the case. Findings:  Obesity . Estimated Blood Loss:  Minimal           Drains: none           Total IV Fluids: 1500 ml           Specimens: Stomach (Subtotal)            Complications:  None; patient tolerated the procedure well. Disposition: PACU - hemodynamically stable. Condition: stable    Attending Attestation: I was present and scrubbed for the entire procedure.

## 2018-10-08 NOTE — PROGRESS NOTES
Patient asleep, wakes easily, VSS, instructed on the dilaudid pca, denies nausea, abdomen soft, incisions well approximated, phase I discharge criteria met, will transfer to 4T.

## 2018-10-08 NOTE — PROGRESS NOTES
Patient assessment, H&P, problem list, and check list has been reviewed. PreOP diet confirmed with patient.

## 2018-10-08 NOTE — ANESTHESIA POSTPROCEDURE EVALUATION
Department of Anesthesiology  Postprocedure Note    Patient: Shiela Montoya  MRN: [id number]  YOB: 1980  Date of evaluation: 10/8/2018  Time:  4:14 PM     Procedure Summary     Date:  10/08/18 Room / Location:  [facility] OR 04 / [facility] OR    Anesthesia Start:  1329 Anesthesia Stop:  2419    Procedure:  [address] (N/A Abdomen) Diagnosis:       (MORBID OBESITY E66.01)      (FATTY LIVER  K76.0)    Surgeon:  Claus Barros MD Responsible Provider:  Jossy Christine MD    Anesthesia Type:  general ASA Status:  3          Anesthesia Type: general    Bud Phase I: Bud Score: 8    Bud Phase II:      Last vitals: Reviewed and per EMR flowsheets.        Anesthesia Post Evaluation    Patient location during evaluation: PACU  Level of consciousness: awake  Pain score: 3  Airway patency: patent  Complications: no  Cardiovascular status: blood pressure returned to baseline  Respiratory status: acceptable

## 2018-10-08 NOTE — ANESTHESIA PRE PROCEDURE
Department of Anesthesiology  Preprocedure Note       Name:  Anita Quijano   Age:  43 y.o.  :  1980                                          MRN:  [id number]         Date:  10/8/2018      Surgeon: Bashir Herman): Bernadette Mathew MD    Procedure: Procedure(s):  LAPAROSCOPIC SLEEVE GASTRECTOMY-ETHICON  POSSIBLE LIVER BIOPSY    Medications prior to admission:   Prior to Admission medications    Medication Sig Start Date End Date Taking? Authorizing Provider   ABILIFY 5 MG tablet TAKE ONE TABLET BY MOUTH ONCE DAILY 18  Yes KUSUM Gee CNP   levothyroxine (SYNTHROID) 137 MCG tablet Take 2 tablets by mouth daily 18  Yes KUSUM Gee CNP   omeprazole (PRILOSEC) 20 MG delayed release capsule Take 1 capsule by mouth Daily 18  Yes Bernadette Mathew MD       Current medications:    Current Facility-Administered Medications   Medication Dose Route Frequency Provider Last Rate Last Dose    ceFAZolin (ANCEF) 3 g in dextrose 5 % 100 mL IVPB  3 g Intravenous On Call to Estefani Haynes MD        lactated ringers infusion   Intravenous Continuous Bernadette Mathew MD 50 mL/hr at 10/08/18 0924      lidocaine PF 1 % injection 0.5 mL  0.5 mL Intradermal Once Bernadette Mathew MD        scopolamine (TRANSDERM-SCOP) transdermal patch 1 patch  1 patch Transdermal Once Bernadette Mathew MD   1 patch at 10/08/18 0924       Allergies:     Allergies   Allergen Reactions    Other Other (See Comments)     Hazelnuts, throat closes       Problem List:    Patient Active Problem List   Diagnosis Code    Chronic lymphocytic thyroiditis E06.3    Primary insomnia F51.01    Irritable R45.4    Class 2 obesity due to excess calories without serious comorbidity in adult E66.09    Morbid obesity with BMI of 40.0-44.9, adult ([facility]) E66.01, Z68.41    Snoring R06.83    Chronic GERD K21.9    Precordial pain R07.2    MILLER (dyspnea on exertion) R06.09    Mixed hyperlipidemia E78.2    Vitamin D deficiency E55.9    Obstructive sleep apnea G47.33       Past Medical History:        Diagnosis Date    Depression     Irritable     Nephrolith 5/2010    Sleep apnea     uses c-pap    Thyroid disease     hypothyroid - chronic lymphocytic thyroiditis       Past Surgical History:        Procedure Laterality Date    HERNIA REPAIR  4/5/16    ventral    THYROIDECTOMY, PARTIAL  3/15    UPPER GASTROINTESTINAL ENDOSCOPY  05/11/2018    VASECTOMY N/A 2012    WISDOM TOOTH EXTRACTION         Social History:    Social History   Substance Use Topics    Smoking status: Former Smoker     Packs/day: 0.50     Years: 17.00     Types: Cigarettes     Quit date: 1/7/2018    Smokeless tobacco: Never Used    Alcohol use No                                Counseling given: Not Answered      Vital Signs (Current):   Vitals:    10/08/18 0908   BP: 122/84   Pulse: 71   Resp: 16   Temp: 97.3 °F (36.3 °C)   TempSrc: Temporal   SpO2: 98%   Height: 6' 1\" (1.854 m)                                              BP Readings from Last 3 Encounters:   10/08/18 122/84   09/25/18 122/78   09/11/18 138/87       NPO Status: Time of last liquid consumption: 2130                        Time of last solid consumption: 2000                        Date of last liquid consumption: 10/07/18                        Date of last solid food consumption: 10/06/18    BMI:   Wt Readings from Last 3 Encounters:   09/25/18 (!) 340 lb (154.2 kg)   09/18/18 (!) 343 lb (155.6 kg)   09/11/18 (!) 342 lb 8 oz (155.4 kg)     Body mass index is 44.86 kg/m².     CBC:   Lab Results   Component Value Date    WBC 7.5 09/19/2018    RBC 5.09 09/19/2018    HGB 15.1 09/19/2018    HCT 45.0 09/19/2018    MCV 88.5 09/19/2018    RDW 14.0 09/19/2018     09/19/2018       CMP:   Lab Results   Component Value Date     09/19/2018    K 4.7 09/19/2018     09/19/2018    CO2 27 09/19/2018    BUN 17 09/19/2018    CREATININE 0.9 09/19/2018    GFRAA >60 09/19/2018    GFRAA >60 12/20/2011    AGRATIO 1.4 09/19/2018    LABGLOM >60 09/19/2018    GLUCOSE 88 09/19/2018    PROT 7.5 09/19/2018    PROT 7.1 12/20/2011    CALCIUM 9.5 09/19/2018    BILITOT 0.4 09/19/2018    ALKPHOS 56 09/19/2018    AST 27 09/19/2018    ALT 40 09/19/2018       POC Tests:   Recent Labs      10/08/18   0924   POCGLU  96       Coags: No results found for: PROTIME, INR, APTT    HCG (If Applicable): No results found for: PREGTESTUR, PREGSERUM, HCG, HCGQUANT     ABGs: No results found for: PHART, PO2ART, TTQ9BUI, IIP6PMI, BEART, A0GFLJDJ     Type & Screen (If Applicable):  No results found for: LABABO, LABRH    Anesthesia Evaluation    Airway: Mallampati: II  TM distance: >3 FB   Neck ROM: full  Mouth opening: > = 3 FB Dental:          Pulmonary:   (+) shortness of breath:  sleep apnea:                             Cardiovascular:    (+) MILLER:,         Rhythm: regular  Rate: normal                    Neuro/Psych:   (+) psychiatric history:            GI/Hepatic/Renal:   (+) GERD:,           Endo/Other:                     Abdominal:   (+) obese,         Vascular:                                        Anesthesia Plan      general     ASA 3                                 Ophelia Hernandez MD   10/8/2018

## 2018-10-09 ENCOUNTER — APPOINTMENT (OUTPATIENT)
Dept: GENERAL RADIOLOGY | Age: 38
DRG: 621 | End: 2018-10-09
Attending: SURGERY
Payer: COMMERCIAL

## 2018-10-09 LAB
ANION GAP SERPL CALCULATED.3IONS-SCNC: 11 MMOL/L (ref 3–16)
BUN BLDV-MCNC: 12 MG/DL (ref 7–20)
CALCIUM SERPL-MCNC: 8.9 MG/DL (ref 8.3–10.6)
CHLORIDE BLD-SCNC: 103 MMOL/L (ref 99–110)
CO2: 26 MMOL/L (ref 21–32)
CREAT SERPL-MCNC: 1 MG/DL (ref 0.9–1.3)
GFR AFRICAN AMERICAN: >60
GFR NON-AFRICAN AMERICAN: >60
GLUCOSE BLD-MCNC: 124 MG/DL (ref 70–99)
HCT VFR BLD CALC: 43.3 % (ref 40.5–52.5)
HEMOGLOBIN: 14.3 G/DL (ref 13.5–17.5)
MCH RBC QN AUTO: 29.3 PG (ref 26–34)
MCHC RBC AUTO-ENTMCNC: 33.1 G/DL (ref 31–36)
MCV RBC AUTO: 88.4 FL (ref 80–100)
PDW BLD-RTO: 13.2 % (ref 12.4–15.4)
PLATELET # BLD: 188 K/UL (ref 135–450)
PMV BLD AUTO: 9.4 FL (ref 5–10.5)
POTASSIUM REFLEX MAGNESIUM: 5 MMOL/L (ref 3.5–5.1)
RBC # BLD: 4.9 M/UL (ref 4.2–5.9)
SODIUM BLD-SCNC: 140 MMOL/L (ref 136–145)
WBC # BLD: 11.6 K/UL (ref 4–11)

## 2018-10-09 PROCEDURE — APPSS180 APP SPLIT SHARED TIME > 60 MINUTES: Performed by: NURSE PRACTITIONER

## 2018-10-09 PROCEDURE — 1200000000 HC SEMI PRIVATE

## 2018-10-09 PROCEDURE — 80048 BASIC METABOLIC PNL TOTAL CA: CPT

## 2018-10-09 PROCEDURE — 6370000000 HC RX 637 (ALT 250 FOR IP): Performed by: SURGERY

## 2018-10-09 PROCEDURE — 99024 POSTOP FOLLOW-UP VISIT: CPT | Performed by: NURSE PRACTITIONER

## 2018-10-09 PROCEDURE — 6360000004 HC RX CONTRAST MEDICATION

## 2018-10-09 PROCEDURE — 6370000000 HC RX 637 (ALT 250 FOR IP): Performed by: NURSE PRACTITIONER

## 2018-10-09 PROCEDURE — 94770 HC ETCO2 MONITOR DAILY: CPT

## 2018-10-09 PROCEDURE — 2580000003 HC RX 258: Performed by: NURSE PRACTITIONER

## 2018-10-09 PROCEDURE — 2700000000 HC OXYGEN THERAPY PER DAY

## 2018-10-09 PROCEDURE — 2580000003 HC RX 258: Performed by: SURGERY

## 2018-10-09 PROCEDURE — 36415 COLL VENOUS BLD VENIPUNCTURE: CPT

## 2018-10-09 PROCEDURE — 2709999900 FL UGI

## 2018-10-09 PROCEDURE — 85027 COMPLETE CBC AUTOMATED: CPT

## 2018-10-09 PROCEDURE — 6360000002 HC RX W HCPCS: Performed by: SURGERY

## 2018-10-09 PROCEDURE — C9113 INJ PANTOPRAZOLE SODIUM, VIA: HCPCS | Performed by: SURGERY

## 2018-10-09 RX ORDER — ONDANSETRON 8 MG/1
8 TABLET, ORALLY DISINTEGRATING ORAL EVERY 8 HOURS PRN
Status: DISCONTINUED | OUTPATIENT
Start: 2018-10-09 | End: 2018-10-10 | Stop reason: HOSPADM

## 2018-10-09 RX ORDER — SODIUM CHLORIDE, SODIUM LACTATE, POTASSIUM CHLORIDE, CALCIUM CHLORIDE 600; 310; 30; 20 MG/100ML; MG/100ML; MG/100ML; MG/100ML
INJECTION, SOLUTION INTRAVENOUS CONTINUOUS
Status: DISCONTINUED | OUTPATIENT
Start: 2018-10-09 | End: 2018-10-10

## 2018-10-09 RX ORDER — PROMETHAZINE HYDROCHLORIDE 25 MG/1
12.5 TABLET ORAL EVERY 4 HOURS PRN
Status: DISCONTINUED | OUTPATIENT
Start: 2018-10-09 | End: 2018-10-10 | Stop reason: HOSPADM

## 2018-10-09 RX ORDER — OXYCODONE HYDROCHLORIDE AND ACETAMINOPHEN 5; 325 MG/1; MG/1
1 TABLET ORAL EVERY 4 HOURS PRN
Status: DISCONTINUED | OUTPATIENT
Start: 2018-10-09 | End: 2018-10-10 | Stop reason: HOSPADM

## 2018-10-09 RX ORDER — ONDANSETRON 4 MG/1
8 TABLET, ORALLY DISINTEGRATING ORAL EVERY 8 HOURS PRN
Qty: 30 TABLET | Refills: 1 | Status: SHIPPED | OUTPATIENT
Start: 2018-10-09 | End: 2018-11-19 | Stop reason: ALTCHOICE

## 2018-10-09 RX ORDER — ONDANSETRON 4 MG/1
8 TABLET, ORALLY DISINTEGRATING ORAL EVERY 8 HOURS PRN
Qty: 30 TABLET | Refills: 0 | Status: SHIPPED | OUTPATIENT
Start: 2018-10-09 | End: 2018-10-22

## 2018-10-09 RX ORDER — OXYCODONE HYDROCHLORIDE AND ACETAMINOPHEN 5; 325 MG/1; MG/1
1 TABLET ORAL EVERY 6 HOURS PRN
Qty: 28 TABLET | Refills: 0 | Status: SHIPPED | OUTPATIENT
Start: 2018-10-09 | End: 2018-10-16

## 2018-10-09 RX ORDER — OXYCODONE HYDROCHLORIDE AND ACETAMINOPHEN 5; 325 MG/1; MG/1
2 TABLET ORAL EVERY 4 HOURS PRN
Status: DISCONTINUED | OUTPATIENT
Start: 2018-10-09 | End: 2018-10-10 | Stop reason: HOSPADM

## 2018-10-09 RX ADMIN — SODIUM CHLORIDE, PRESERVATIVE FREE 10 ML: 5 INJECTION INTRAVENOUS at 21:18

## 2018-10-09 RX ADMIN — ENOXAPARIN SODIUM 40 MG: 40 INJECTION SUBCUTANEOUS at 21:17

## 2018-10-09 RX ADMIN — ACETAMINOPHEN 1000 MG: 10 INJECTION, SOLUTION INTRAVENOUS at 01:25

## 2018-10-09 RX ADMIN — SODIUM CHLORIDE, POTASSIUM CHLORIDE, SODIUM LACTATE AND CALCIUM CHLORIDE: 600; 310; 30; 20 INJECTION, SOLUTION INTRAVENOUS at 23:30

## 2018-10-09 RX ADMIN — ACETAMINOPHEN 1000 MG: 10 INJECTION, SOLUTION INTRAVENOUS at 07:45

## 2018-10-09 RX ADMIN — OXYCODONE HYDROCHLORIDE AND ACETAMINOPHEN 2 TABLET: 5; 325 TABLET ORAL at 21:17

## 2018-10-09 RX ADMIN — SODIUM CHLORIDE, PRESERVATIVE FREE 10 ML: 5 INJECTION INTRAVENOUS at 07:45

## 2018-10-09 RX ADMIN — SODIUM CHLORIDE, POTASSIUM CHLORIDE, SODIUM LACTATE AND CALCIUM CHLORIDE: 600; 310; 30; 20 INJECTION, SOLUTION INTRAVENOUS at 16:01

## 2018-10-09 RX ADMIN — OXYCODONE HYDROCHLORIDE AND ACETAMINOPHEN 2 TABLET: 5; 325 TABLET ORAL at 16:17

## 2018-10-09 RX ADMIN — OXYCODONE HYDROCHLORIDE AND ACETAMINOPHEN 2 TABLET: 5; 325 TABLET ORAL at 11:47

## 2018-10-09 RX ADMIN — IOHEXOL 45 ML: 350 INJECTION, SOLUTION INTRAVENOUS at 08:38

## 2018-10-09 RX ADMIN — ENOXAPARIN SODIUM 40 MG: 40 INJECTION SUBCUTANEOUS at 03:36

## 2018-10-09 RX ADMIN — PANTOPRAZOLE SODIUM 40 MG: 40 INJECTION, POWDER, FOR SOLUTION INTRAVENOUS at 07:45

## 2018-10-09 RX ADMIN — CEFAZOLIN 3 G: 10 INJECTION, POWDER, FOR SOLUTION INTRAVENOUS; PARENTERAL at 06:01

## 2018-10-09 ASSESSMENT — PAIN SCALES - GENERAL
PAINLEVEL_OUTOF10: 7
PAINLEVEL_OUTOF10: 4
PAINLEVEL_OUTOF10: 4
PAINLEVEL_OUTOF10: 5
PAINLEVEL_OUTOF10: 6
PAINLEVEL_OUTOF10: 7

## 2018-10-09 ASSESSMENT — PAIN DESCRIPTION - PAIN TYPE
TYPE: SURGICAL PAIN
TYPE: SURGICAL PAIN

## 2018-10-09 ASSESSMENT — PAIN DESCRIPTION - LOCATION
LOCATION: ABDOMEN
LOCATION: ABDOMEN

## 2018-10-09 NOTE — PROGRESS NOTES
CLINICAL PHARMACY NOTE: MEDS TO [address] Select Patient?: No  Total # of Prescriptions Filled: 1   The following medications were delivered to the patient:  · Percocet 5/325  Total # of Interventions Completed: 1  Time Spent (min): 30    Additional Documentation:    Patient did not want zofran prescriptions.  Patient signed for prescription

## 2018-10-09 NOTE — DISCHARGE SUMMARY
The patient was admitted on day of surgery and underwent a laparoscopic sleeve gastrectomy. Surgery went well and the patient went to our post-op bariatric floor. Overnight, the patient had stable vitals signs, good urine output and ambulated in the halls. On POD #1 the patient underwent an UGI which revealed no leak or obstruction. Phase I diet was started and the patient tolerated well. Patient was unable to void sufficiently post Carter catheter so was kept another night to monitor for void. POD #2 The patient has no N/V, tolerating diet, ambulating, voided sufficiently post Carter catheter and pain controlled on oral medication. The patient was discharged home today in stable condition. The patient will f/u in 2 weeks and was given dietary and ambulation instruction. The patient was instructed to call if there are any questions or concerns.      Patient Active Problem List   Diagnosis    Chronic lymphocytic thyroiditis    Primary insomnia    Irritable    Class 2 obesity due to excess calories without serious comorbidity in adult    Morbid obesity with BMI of 40.0-44.9, adult (HCC)    Snoring    Chronic GERD    Precordial pain    MILLER (dyspnea on exertion)    Mixed hyperlipidemia    Vitamin D deficiency    Obstructive sleep apnea    S/P laparoscopic sleeve gastrectomy

## 2018-10-09 NOTE — PROGRESS NOTES
PM assessment complete, PCA pump controlling pain, VSS, lap sites CDI. The care plan and education has been reviewed and mutually agreed upon with the patient, call light within reach, will continue to monitor.

## 2018-10-10 VITALS
RESPIRATION RATE: 16 BRPM | TEMPERATURE: 98.4 F | OXYGEN SATURATION: 94 % | HEIGHT: 75 IN | HEART RATE: 90 BPM | SYSTOLIC BLOOD PRESSURE: 143 MMHG | WEIGHT: 315 LBS | BODY MASS INDEX: 39.17 KG/M2 | DIASTOLIC BLOOD PRESSURE: 92 MMHG

## 2018-10-10 PROCEDURE — C9113 INJ PANTOPRAZOLE SODIUM, VIA: HCPCS | Performed by: SURGERY

## 2018-10-10 PROCEDURE — 2580000003 HC RX 258: Performed by: SURGERY

## 2018-10-10 PROCEDURE — 6360000002 HC RX W HCPCS: Performed by: SURGERY

## 2018-10-10 PROCEDURE — 94760 N-INVAS EAR/PLS OXIMETRY 1: CPT

## 2018-10-10 PROCEDURE — 2580000003 HC RX 258: Performed by: NURSE PRACTITIONER

## 2018-10-10 PROCEDURE — 6370000000 HC RX 637 (ALT 250 FOR IP): Performed by: SURGERY

## 2018-10-10 PROCEDURE — 6370000000 HC RX 637 (ALT 250 FOR IP): Performed by: NURSE PRACTITIONER

## 2018-10-10 RX ADMIN — SODIUM CHLORIDE, POTASSIUM CHLORIDE, SODIUM LACTATE AND CALCIUM CHLORIDE: 600; 310; 30; 20 INJECTION, SOLUTION INTRAVENOUS at 04:00

## 2018-10-10 RX ADMIN — OXYCODONE HYDROCHLORIDE AND ACETAMINOPHEN 2 TABLET: 5; 325 TABLET ORAL at 09:23

## 2018-10-10 RX ADMIN — SODIUM CHLORIDE, PRESERVATIVE FREE 10 ML: 5 INJECTION INTRAVENOUS at 09:24

## 2018-10-10 RX ADMIN — OXYCODONE HYDROCHLORIDE AND ACETAMINOPHEN 2 TABLET: 5; 325 TABLET ORAL at 03:59

## 2018-10-10 RX ADMIN — ENOXAPARIN SODIUM 40 MG: 40 INJECTION SUBCUTANEOUS at 09:23

## 2018-10-10 RX ADMIN — PANTOPRAZOLE SODIUM 40 MG: 40 INJECTION, POWDER, FOR SOLUTION INTRAVENOUS at 09:24

## 2018-10-10 ASSESSMENT — PAIN DESCRIPTION - LOCATION: LOCATION: ABDOMEN

## 2018-10-10 ASSESSMENT — PAIN SCALES - GENERAL
PAINLEVEL_OUTOF10: 7
PAINLEVEL_OUTOF10: 5
PAINLEVEL_OUTOF10: 7

## 2018-10-10 ASSESSMENT — PAIN DESCRIPTION - PAIN TYPE: TYPE: SURGICAL PAIN

## 2018-10-15 ENCOUNTER — TELEPHONE (OUTPATIENT)
Dept: BARIATRICS/WEIGHT MGMT | Age: 38
End: 2018-10-15

## 2018-10-15 DIAGNOSIS — Z98.84 S/P LAPAROSCOPIC SLEEVE GASTRECTOMY: Primary | ICD-10-CM

## 2018-10-22 ENCOUNTER — OFFICE VISIT (OUTPATIENT)
Dept: BARIATRICS/WEIGHT MGMT | Age: 38
End: 2018-10-22

## 2018-10-22 VITALS
HEIGHT: 75 IN | HEART RATE: 84 BPM | SYSTOLIC BLOOD PRESSURE: 112 MMHG | RESPIRATION RATE: 16 BRPM | DIASTOLIC BLOOD PRESSURE: 84 MMHG | WEIGHT: 308 LBS | BODY MASS INDEX: 38.3 KG/M2

## 2018-10-22 DIAGNOSIS — E66.01 SEVERE OBESITY (BMI 35.0-35.9 WITH COMORBIDITY) (HCC): ICD-10-CM

## 2018-10-22 DIAGNOSIS — G47.33 OBSTRUCTIVE SLEEP APNEA: ICD-10-CM

## 2018-10-22 DIAGNOSIS — K21.9 CHRONIC GERD: ICD-10-CM

## 2018-10-22 DIAGNOSIS — Z98.84 S/P LAPAROSCOPIC SLEEVE GASTRECTOMY: Primary | ICD-10-CM

## 2018-10-22 DIAGNOSIS — E78.2 MIXED HYPERLIPIDEMIA: ICD-10-CM

## 2018-10-22 PROCEDURE — 99024 POSTOP FOLLOW-UP VISIT: CPT | Performed by: NURSE PRACTITIONER

## 2018-10-22 ASSESSMENT — ENCOUNTER SYMPTOMS
ROS SKIN COMMENTS: SURGICAL INCISIONS.
GASTROINTESTINAL NEGATIVE: 1
RESPIRATORY NEGATIVE: 1
EYES NEGATIVE: 1
ALLERGIC/IMMUNOLOGIC NEGATIVE: 1

## 2018-11-19 ENCOUNTER — OFFICE VISIT (OUTPATIENT)
Dept: BARIATRICS/WEIGHT MGMT | Age: 38
End: 2018-11-19

## 2018-11-19 VITALS
BODY MASS INDEX: 37.05 KG/M2 | DIASTOLIC BLOOD PRESSURE: 82 MMHG | WEIGHT: 298 LBS | HEIGHT: 75 IN | HEART RATE: 84 BPM | SYSTOLIC BLOOD PRESSURE: 124 MMHG | RESPIRATION RATE: 20 BRPM

## 2018-11-19 DIAGNOSIS — K21.9 CHRONIC GERD: ICD-10-CM

## 2018-11-19 DIAGNOSIS — Z98.84 S/P LAPAROSCOPIC SLEEVE GASTRECTOMY: ICD-10-CM

## 2018-11-19 DIAGNOSIS — E66.01 SEVERE OBESITY (BMI 35.0-35.9 WITH COMORBIDITY) (HCC): Primary | ICD-10-CM

## 2018-11-19 DIAGNOSIS — E78.2 MIXED HYPERLIPIDEMIA: ICD-10-CM

## 2018-11-19 DIAGNOSIS — G47.33 OBSTRUCTIVE SLEEP APNEA: ICD-10-CM

## 2018-11-19 PROCEDURE — 99024 POSTOP FOLLOW-UP VISIT: CPT | Performed by: NURSE PRACTITIONER

## 2018-11-19 ASSESSMENT — ENCOUNTER SYMPTOMS
RESPIRATORY NEGATIVE: 1
EYES NEGATIVE: 1
GASTROINTESTINAL NEGATIVE: 1
ALLERGIC/IMMUNOLOGIC NEGATIVE: 1
ROS SKIN COMMENTS: SURGICAL INCISIONS.

## 2018-11-19 NOTE — LETTER
[address] [place] [address] [email or web address] [facility]  [address] [place] [address]  Phone: [phone]  Fax: [phone]    KUSUM Pinto CNP        November 19, 2018     Patient: Ann Macdonald   YOB: 1980   Date of Visit: 11/19/2018       To Whom it May Concern:    Mike Yost was seen in my clinic on 11/19/2018. He may return to work 11/21/18 without restrictions. If you have any questions or concerns, please don't hesitate to call.     Sincerely,         KUSUM Pinto CNP

## 2018-11-19 NOTE — PROGRESS NOTES
Dietary Assessment Note    Vitals:   Vitals:    18 1553   BP: 124/82   Pulse: 84   Resp: 20   Weight: 298 lb (135.2 kg)   Height: 6' 3\" (1.905 m)    Patient lost 10 lbs over 1 month. Total Weight Loss: 44.5#    Labs reviewed: labs are reviewed, up to date and normal    Protein intake: 60-80 grams/day     Fluid intake: <40 fl oz/day    Multivitamin/mineral intake: Fusion - how many/day: 4    Calcium intake: None    Other: None    Exercise: Yes. How much: 30-40 minutes 4-5 days per week. What kind: walking    Nutrition Assessment: 6 weeks post-op visit.      Breakfast: Egg w/cheese    Snack: Pudding    Lunch: Chicken w/ mustard    Snack: None    Dinner: Flavored tuna    Snack: None    Fluids: powerade zero, G2, protein water (3 bottles)    Amount able to eat per sittin/2 cup    Following 30 rule: Yes    Food Intolerances/issues: none    Client Concerns: None    Goals:   - Advance to phase 4 diet  - Avoiding G2  - Increasing fluid intake to 48-64 oz  - Start [facility] 2 days/week    Plan: Follow up at 12 weeks post op and as needed    [employer]

## 2018-11-29 ENCOUNTER — PATIENT MESSAGE (OUTPATIENT)
Dept: BARIATRICS/WEIGHT MGMT | Age: 38
End: 2018-11-29

## 2018-11-29 DIAGNOSIS — Z98.84 S/P LAPAROSCOPIC SLEEVE GASTRECTOMY: ICD-10-CM

## 2018-11-29 DIAGNOSIS — R11.0 NAUSEA: Primary | ICD-10-CM

## 2018-11-29 RX ORDER — ONDANSETRON 4 MG/1
8 TABLET, ORALLY DISINTEGRATING ORAL EVERY 8 HOURS PRN
Qty: 30 TABLET | Refills: 2 | Status: SHIPPED | OUTPATIENT
Start: 2018-11-29 | End: 2019-04-17 | Stop reason: ALTCHOICE

## 2018-12-27 ENCOUNTER — OFFICE VISIT (OUTPATIENT)
Dept: ORTHOPEDIC SURGERY | Age: 38
End: 2018-12-27
Payer: COMMERCIAL

## 2018-12-27 VITALS
SYSTOLIC BLOOD PRESSURE: 128 MMHG | DIASTOLIC BLOOD PRESSURE: 79 MMHG | WEIGHT: 285 LBS | BODY MASS INDEX: 35.43 KG/M2 | HEIGHT: 75 IN

## 2018-12-27 DIAGNOSIS — S39.012A LUMBAR STRAIN, INITIAL ENCOUNTER: Primary | ICD-10-CM

## 2018-12-27 DIAGNOSIS — M51.26 HNP (HERNIATED NUCLEUS PULPOSUS), LUMBAR: ICD-10-CM

## 2018-12-27 DIAGNOSIS — M54.50 PAIN OF LUMBAR SPINE: ICD-10-CM

## 2018-12-27 PROCEDURE — 99213 OFFICE O/P EST LOW 20 MIN: CPT | Performed by: PHYSICAL MEDICINE & REHABILITATION

## 2018-12-27 RX ORDER — CYCLOBENZAPRINE HCL 10 MG
10 TABLET ORAL NIGHTLY
Qty: 30 TABLET | Refills: 0 | Status: SHIPPED | OUTPATIENT
Start: 2018-12-27 | End: 2019-01-26

## 2018-12-27 NOTE — PROGRESS NOTES
Ref Range    Sodium 140 136 - 145 mmol/L    Potassium reflex Magnesium 5.0 3.5 - 5.1 mmol/L    Chloride 103 99 - 110 mmol/L    CO2 26 21 - 32 mmol/L    Anion Gap 11 3 - 16    Glucose 124 (H) 70 - 99 mg/dL    BUN 12 7 - 20 mg/dL    CREATININE 1.0 0.9 - 1.3 mg/dL    GFR Non-African American >60 >60    GFR African American >60 >60    Calcium 8.9 8.3 - 10.6 mg/dL   CBC   Result Value Ref Range    WBC 11.6 (H) 4.0 - 11.0 K/uL    RBC 4.90 4.20 - 5.90 M/uL    Hemoglobin 14.3 13.5 - 17.5 g/dL    Hematocrit 43.3 40.5 - 52.5 %    MCV 88.4 80.0 - 100.0 fL    MCH 29.3 26.0 - 34.0 pg    MCHC 33.1 31.0 - 36.0 g/dL    RDW 13.2 12.4 - 15.4 %    Platelets 542 017 - 948 K/uL    MPV 9.4 5.0 - 10.5 fL   POCT Glucose   Result Value Ref Range    POC Glucose 96 70 - 99 mg/dl    Performed on ACCU-CHEK    TYPE AND SCREEN   Result Value Ref Range    ABO/Rh O POS     Antibody Screen NEG        Impression (Medical Decision Making):       1. Lumbar strain, initial encounter    2. HNP (herniated nucleus pulposus), lumbar    3. Pain of lumbar spine        Plan (Medical Decision Making):    I discussed the diagnosis and the treatment options with Pasquale Ta today. In Summary:  The various treatment options were outlined and discussed with Jaguar Patel including:  Conservative care options: physical therapy, ice, medications, bracing, and activity modification. The indications for therapeutic injections. The indications for additional imaging/laboratory studies. The indications for (possible future) interventions. After considering the various options discussed, Pasquale Ta elected to pursue a course of treatment that includes the followin. Medications: I will add a Flexeril 10 mg po qhs to the current regimen. Counseled on risks, benefits and alternatives and recommended not to take the medicine and drive or operate heavy machinery.   I will check with Dr Josue Madrigal if he can proceed with oral steroids given his

## 2018-12-29 ENCOUNTER — TELEPHONE (OUTPATIENT)
Dept: OTHER | Age: 38
End: 2018-12-29

## 2019-01-03 ENCOUNTER — OFFICE VISIT (OUTPATIENT)
Dept: BARIATRICS/WEIGHT MGMT | Age: 39
End: 2019-01-03

## 2019-01-03 ENCOUNTER — TELEPHONE (OUTPATIENT)
Dept: BARIATRICS/WEIGHT MGMT | Age: 39
End: 2019-01-03

## 2019-01-03 VITALS
BODY MASS INDEX: 35.93 KG/M2 | HEART RATE: 88 BPM | WEIGHT: 289 LBS | RESPIRATION RATE: 16 BRPM | SYSTOLIC BLOOD PRESSURE: 122 MMHG | DIASTOLIC BLOOD PRESSURE: 82 MMHG | HEIGHT: 75 IN

## 2019-01-03 DIAGNOSIS — M51.26 HNP (HERNIATED NUCLEUS PULPOSUS), LUMBAR: ICD-10-CM

## 2019-01-03 DIAGNOSIS — E55.9 VITAMIN D DEFICIENCY: ICD-10-CM

## 2019-01-03 DIAGNOSIS — E66.01 SEVERE OBESITY (BMI 35.0-35.9 WITH COMORBIDITY) (HCC): ICD-10-CM

## 2019-01-03 DIAGNOSIS — Z98.84 S/P LAPAROSCOPIC SLEEVE GASTRECTOMY: Primary | ICD-10-CM

## 2019-01-03 DIAGNOSIS — K21.9 CHRONIC GERD: ICD-10-CM

## 2019-01-03 DIAGNOSIS — E78.2 MIXED HYPERLIPIDEMIA: ICD-10-CM

## 2019-01-03 DIAGNOSIS — S39.012A STRAIN OF LUMBAR REGION, INITIAL ENCOUNTER: Primary | ICD-10-CM

## 2019-01-03 DIAGNOSIS — G47.33 OBSTRUCTIVE SLEEP APNEA: ICD-10-CM

## 2019-01-03 PROCEDURE — 99024 POSTOP FOLLOW-UP VISIT: CPT | Performed by: NURSE PRACTITIONER

## 2019-01-03 PROCEDURE — APPSS15 APP SPLIT SHARED TIME 0-15 MINUTES: Performed by: NURSE PRACTITIONER

## 2019-01-03 ASSESSMENT — ENCOUNTER SYMPTOMS
RESPIRATORY NEGATIVE: 1
BACK PAIN: 1
GASTROINTESTINAL NEGATIVE: 1

## 2019-01-06 RX ORDER — METHYLPREDNISOLONE 4 MG/1
TABLET ORAL
Qty: 1 KIT | Refills: 0 | OUTPATIENT
Start: 2019-01-06 | End: 2019-01-09

## 2019-03-25 ENCOUNTER — OFFICE VISIT (OUTPATIENT)
Dept: PULMONOLOGY | Age: 39
End: 2019-03-25
Payer: COMMERCIAL

## 2019-03-25 VITALS
HEART RATE: 82 BPM | OXYGEN SATURATION: 98 % | TEMPERATURE: 97.9 F | WEIGHT: 286 LBS | DIASTOLIC BLOOD PRESSURE: 62 MMHG | HEIGHT: 75 IN | RESPIRATION RATE: 20 BRPM | SYSTOLIC BLOOD PRESSURE: 124 MMHG | BODY MASS INDEX: 35.56 KG/M2

## 2019-03-25 DIAGNOSIS — Z87.891 PERSONAL HISTORY OF SMOKING: ICD-10-CM

## 2019-03-25 DIAGNOSIS — G47.33 MILD OBSTRUCTIVE SLEEP APNEA: Primary | ICD-10-CM

## 2019-03-25 DIAGNOSIS — E66.01 MORBID OBESITY (HCC): ICD-10-CM

## 2019-03-25 PROCEDURE — 99214 OFFICE O/P EST MOD 30 MIN: CPT | Performed by: INTERNAL MEDICINE

## 2019-03-25 ASSESSMENT — SLEEP AND FATIGUE QUESTIONNAIRES
HOW LIKELY ARE YOU TO NOD OFF OR FALL ASLEEP WHILE SITTING INACTIVE IN A PUBLIC PLACE: 1
HOW LIKELY ARE YOU TO NOD OFF OR FALL ASLEEP WHILE SITTING AND TALKING TO SOMEONE: 0
HOW LIKELY ARE YOU TO NOD OFF OR FALL ASLEEP WHILE SITTING QUIETLY AFTER LUNCH WITHOUT ALCOHOL: 1
HOW LIKELY ARE YOU TO NOD OFF OR FALL ASLEEP WHILE WATCHING TV: 1
HOW LIKELY ARE YOU TO NOD OFF OR FALL ASLEEP IN A CAR, WHILE STOPPED FOR A FEW MINUTES IN TRAFFIC: 0
HOW LIKELY ARE YOU TO NOD OFF OR FALL ASLEEP WHILE SITTING AND READING: 1
NECK CIRCUMFERENCE (INCHES): 16.25
HOW LIKELY ARE YOU TO NOD OFF OR FALL ASLEEP WHILE LYING DOWN TO REST IN THE AFTERNOON WHEN CIRCUMSTANCES PERMIT: 2
HOW LIKELY ARE YOU TO NOD OFF OR FALL ASLEEP WHEN YOU ARE A PASSENGER IN A CAR FOR AN HOUR WITHOUT A BREAK: 2
ESS TOTAL SCORE: 8

## 2019-03-29 ASSESSMENT — ENCOUNTER SYMPTOMS
GASTROINTESTINAL NEGATIVE: 1
RESPIRATORY NEGATIVE: 1
ALLERGIC/IMMUNOLOGIC NEGATIVE: 1
EYES NEGATIVE: 1

## 2019-03-29 NOTE — PROGRESS NOTES
Take 2 tablets by mouth every 8 hours as needed for Nausea, Disp: 30 tablet, Rfl: 2    Multiple Vitamins-Minerals (BARIATRIC FUSION) CHEW, Take 4 tablets by mouth daily, Disp: , Rfl:     ABILIFY 5 MG tablet, TAKE ONE TABLET BY MOUTH ONCE DAILY, Disp: 90 tablet, Rfl: 1    levothyroxine (SYNTHROID) 137 MCG tablet, Take 2 tablets by mouth daily, Disp: 60 tablet, Rfl: 4    omeprazole (PRILOSEC) 20 MG delayed release capsule, Take 1 capsule by mouth Daily, Disp: 30 capsule, Rfl: 3    Lab Results   Component Value Date    WBC 11.6 10/09/2018    RBC 4.90 10/09/2018    HGB 14.3 10/09/2018    HCT 43.3 10/09/2018    MCV 88.4 10/09/2018    MCH 29.3 10/09/2018    MCHC 33.1 10/09/2018    MPV 9.4 10/09/2018    NEUTOPHILPCT 51.8 04/25/2018    LYMPHOPCT 33.1 04/25/2018    MONOPCT 11.7 04/25/2018    EOSRELPCT 3.0 04/25/2018    BASOPCT 0.4 04/25/2018    NEUTROABS 3.3 04/25/2018    LYMPHSABS 2.1 04/25/2018    MONOSABS 0.7 04/25/2018    EOSABS 0.2 04/25/2018     Lab Results   Component Value Date     10/09/2018    K 5.0 10/09/2018     10/09/2018    CO2 26 10/09/2018    ANIONGAP 11 10/09/2018    GLUCOSE 124 10/09/2018    BUN 12 10/09/2018    CREATININE 1.0 10/09/2018    LABGLOM >60 10/09/2018    GFRAA >60 10/09/2018    GFRAA >60 12/20/2011    CALCIUM 8.9 10/09/2018    PROT 7.5 09/19/2018    PROT 7.1 12/20/2011    LABALBU 4.4 09/19/2018    AGRATIO 1.4 09/19/2018    BILITOT 0.4 09/19/2018    ALKPHOS 56 09/19/2018    ALT 40 09/19/2018    AST 27 09/19/2018    GLOB 3.1 09/19/2018     Lab Results   Component Value Date    CHOL 197 10/13/2017    TRIG 220 10/13/2017    HDL 26 04/25/2018    HDL 34 03/27/2012    LDLCALC see below 04/25/2018    LABVLDL see below 04/25/2018     Lab Results   Component Value Date    TSHREFLEX 12.42 04/25/2018     Lab Results   Component Value Date    IRON 80 04/25/2018    TIBC 321 04/25/2018    LABIRON 25 04/25/2018     Lab Results   Component Value Date    IJMIMELA04 343 04/25/2018    FOLATE 18.91 the registered dietitian for continued follow up. I agree with recommendations and plan. Follow up labs ordered and patient is responsible for completing. We will see him back in 3 months for continued follow up. A total of 15 minutes was spent face-to-face with the patient and over half of that time was spent counseling the patient on proper dietary behaviors, exercise and post-op progress.

## 2019-04-01 ENCOUNTER — OFFICE VISIT (OUTPATIENT)
Dept: BARIATRICS/WEIGHT MGMT | Age: 39
End: 2019-04-01
Payer: COMMERCIAL

## 2019-04-01 VITALS
WEIGHT: 287 LBS | DIASTOLIC BLOOD PRESSURE: 78 MMHG | BODY MASS INDEX: 35.68 KG/M2 | HEIGHT: 75 IN | HEART RATE: 75 BPM | SYSTOLIC BLOOD PRESSURE: 123 MMHG

## 2019-04-01 DIAGNOSIS — Z98.84 S/P LAPAROSCOPIC SLEEVE GASTRECTOMY: Primary | ICD-10-CM

## 2019-04-01 DIAGNOSIS — G47.33 OBSTRUCTIVE SLEEP APNEA: ICD-10-CM

## 2019-04-01 DIAGNOSIS — E66.01 SEVERE OBESITY (BMI 35.0-35.9 WITH COMORBIDITY) (HCC): ICD-10-CM

## 2019-04-01 DIAGNOSIS — K21.9 CHRONIC GERD: ICD-10-CM

## 2019-04-01 DIAGNOSIS — E78.2 MIXED HYPERLIPIDEMIA: ICD-10-CM

## 2019-04-01 PROCEDURE — 99213 OFFICE O/P EST LOW 20 MIN: CPT | Performed by: NURSE PRACTITIONER

## 2019-04-01 NOTE — PROGRESS NOTES
Dietary Assessment Note    Vitals:   Vitals:    19 0823   BP: 123/78   Pulse: 75   Weight: 287 lb (130.2 kg)   Height: 6' 3\" (1.905 m)    Patient lost 2 lbs over past 3 months. Total Weight Loss: 55.5 lbs    Labs reviewed: Results for Francis Duarte (MRN [id number]) as of 2019 08:53   Ref. Range 10/9/2018 06:54   Sodium Latest Ref Range: 136 - 145 mmol/L 140   Potassium Latest Ref Range: 3.5 - 5.1 mmol/L 5.0   Chloride Latest Ref Range: 99 - 110 mmol/L 103   CO2 Latest Ref Range: 21 - 32 mmol/L 26   BUN Latest Ref Range: 7 - 20 mg/dL 12   Creatinine Latest Ref Range: 0.9 - 1.3 mg/dL 1.0   Anion Gap Latest Ref Range: 3 - 16  11   GFR Non- Latest Ref Range: >60  >60   GFR  Latest Ref Range: >60  >60   Glucose Latest Ref Range: 70 - 99 mg/dL 124 (H)   Calcium Latest Ref Range: 8.3 - 10.6 mg/dL 8.9       Protein intake: Patient not tracking     Fluid intake: >64 oz/day     Multivitamin/mineral intake: 2 gummy MVI     Calcium intake: none    Other: none     Exercise: Got a dog- walks dog around block 3-4 x day (20 minutes each time)     Nutrition Assessment: 6 mo s/p sleeve post-op visit.      Breakfast: ham and cheese omelette with wheat toast    Snack: sometimes- 1 cheese stick OR raw almonds OR P3 pack with granola bar and cheese stick     Lunch: turkey and cheese sandwich     Snack: P3 back with granola bar and cheese stick     Dinner: steak fajita (steak, cheese, sour cream) OR chicken with corn/macaroni and cheese/mashed potatoes     Snack:    Fluids: powerade/gatorade zero, protein water, tried a sip of pop but reports he didn't like it and hasn't drank anymore, sweet tea    Amount able to eat per sittin/2-3/4 cup/sitting    Following 30 rule: Not eating and drinking at same, eating faster     Food Intolerances/issues: cereal and milk      Client Concerns: none     Goals:   Avoid sweet tea - try unsweetened tea with sweetener  Discussed avoiding gummy MVI and

## 2019-04-01 NOTE — PATIENT INSTRUCTIONS
Patient received dietary handouts and education. Diet tips to help make you successful postoperatively  Eating habits after surgery will have to be a permanent and long-term change. Eating habits are so ingrained that it can be difficult to change. It is important to maintain these new eating habits after surgery. Also remember that overall health, age, and genetics make each persons weight loss progress different. Do not compare your progress, the amount you eat, or exercise to other patients.  Protein first at every meal- Eat the protein portion of your meal first. Eating protein helps the body feel full and sends a signal to stop eating. Protein is very important in building tissue in the body.  Eat at least 4 times per day- This includes protein supplements and small meals with a high amount of protein   Chewing your food thoroughly- Eating too quickly and improper chewing can cause pain and vomiting after surgery.  Slowing down the speed at which you eat- Refill your fork only after you swallow. Adopt a new pattern of eating by taking a bite of food and putting your utensil down between bites. This will help to reduce the feeling of food being stuck.    Drink water and start drinking fluids slowly- Drink at least 48 ounces per day minimum. Sip fluids as if they were hot beverages. If you find it difficult to stop gulping liquids, try using a sippy cup or a sport top water bottle.  Make sure you are eating meals without drinking fluids- After surgery you will not be allowed to drink fluids 30 minutes before, during, or 30 minutes after your meal (30/30/30 rule). This will be a life-long behavior change. The reason for the rule is to keep food from passing through your smaller stomach more rapidly.  This will cause you to feel hungry shortly after your meal.   Continue to avoid caffeine and carbonated beverages- Caffeine acts as a diuretic and can be dehydrating as well as irritating to the lining of the stomach. Carbonated beverages release gas and can expand the stomach.  Continue to keep temptation from your kitchen- Keep your pantry and kitchen cabinets cleaned out of those dangerous foods that might tempt you after surgery (chips, cookies, candy, etc.).  Continue to increase your exercise program- Increase your daily physical activity. Aim for 5-6 days per week for 30 minutes. Walking is an easy way to get started with exercising. Exercise is going to be a regular part of your life after surgery.  Make sure you have a good support system- There will be many changes and adjustments to make after surgery. It is important to have a supportive friend, family member or co-worker, etc. with whom you can talk. Continue to attend [facility]) Weight Management support groups as they can be helpful in maintaining behaviors. In addition, it is the responsibility of the patient to schedule and follow up on labs and tests completed after surgery. Results will be reviewed at each visit.

## 2019-04-11 ENCOUNTER — TELEPHONE (OUTPATIENT)
Dept: FAMILY MEDICINE CLINIC | Age: 39
End: 2019-04-11

## 2019-04-16 ENCOUNTER — HOSPITAL ENCOUNTER (OUTPATIENT)
Age: 39
Discharge: HOME | End: 2019-04-16
Payer: COMMERCIAL

## 2019-04-16 DIAGNOSIS — E66.01 SEVERE OBESITY (BMI 35.0-35.9 WITH COMORBIDITY) (HCC): ICD-10-CM

## 2019-04-16 DIAGNOSIS — E78.2 MIXED HYPERLIPIDEMIA: ICD-10-CM

## 2019-04-16 DIAGNOSIS — Z98.84 S/P LAPAROSCOPIC SLEEVE GASTRECTOMY: ICD-10-CM

## 2019-04-16 LAB
A/G RATIO: 1.3 (ref 1.1–2.2)
ALBUMIN SERPL-MCNC: 4.1 G/DL (ref 3.4–5)
ALP BLD-CCNC: 49 U/L (ref 40–129)
ALT SERPL-CCNC: 16 U/L (ref 10–40)
ANION GAP SERPL CALCULATED.3IONS-SCNC: 10 MMOL/L (ref 3–16)
AST SERPL-CCNC: 22 U/L (ref 15–37)
BILIRUB SERPL-MCNC: 0.6 MG/DL (ref 0–1)
BUN BLDV-MCNC: 14 MG/DL (ref 7–20)
CALCIUM SERPL-MCNC: 8.8 MG/DL (ref 8.3–10.6)
CHLORIDE BLD-SCNC: 103 MMOL/L (ref 99–110)
CHOLESTEROL, TOTAL: 176 MG/DL (ref 0–199)
CO2: 28 MMOL/L (ref 21–32)
CREAT SERPL-MCNC: 0.7 MG/DL (ref 0.9–1.3)
FOLATE: 4.85 NG/ML (ref 4.78–24.2)
GFR AFRICAN AMERICAN: >60
GFR NON-AFRICAN AMERICAN: >60
GLOBULIN: 3.2 G/DL
GLUCOSE BLD-MCNC: 87 MG/DL (ref 70–99)
HDLC SERPL-MCNC: 35 MG/DL (ref 40–60)
IRON SATURATION: 33 % (ref 20–50)
IRON: 90 UG/DL (ref 59–158)
LDL CHOLESTEROL CALCULATED: 107 MG/DL
POTASSIUM SERPL-SCNC: 4.2 MMOL/L (ref 3.5–5.1)
SODIUM BLD-SCNC: 141 MMOL/L (ref 136–145)
T4 FREE: 0.4 NG/DL (ref 0.9–1.8)
TOTAL IRON BINDING CAPACITY: 269 UG/DL (ref 260–445)
TOTAL PROTEIN: 7.3 G/DL (ref 6.4–8.2)
TRIGL SERPL-MCNC: 171 MG/DL (ref 0–150)
TSH REFLEX: 123.7 UIU/ML (ref 0.27–4.2)
VITAMIN B-12: 278 PG/ML (ref 211–911)
VITAMIN D 25-HYDROXY: 16.5 NG/ML
VLDLC SERPL CALC-MCNC: 34 MG/DL

## 2019-04-16 PROCEDURE — 84425 ASSAY OF VITAMIN B-1: CPT

## 2019-04-16 PROCEDURE — 83540 ASSAY OF IRON: CPT

## 2019-04-16 PROCEDURE — 83036 HEMOGLOBIN GLYCOSYLATED A1C: CPT

## 2019-04-16 PROCEDURE — 83550 IRON BINDING TEST: CPT

## 2019-04-16 PROCEDURE — 84439 ASSAY OF FREE THYROXINE: CPT

## 2019-04-16 PROCEDURE — 82746 ASSAY OF FOLIC ACID SERUM: CPT

## 2019-04-16 PROCEDURE — 36415 COLL VENOUS BLD VENIPUNCTURE: CPT

## 2019-04-16 PROCEDURE — 84446 ASSAY OF VITAMIN E: CPT

## 2019-04-16 PROCEDURE — 84590 ASSAY OF VITAMIN A: CPT

## 2019-04-16 PROCEDURE — 82607 VITAMIN B-12: CPT

## 2019-04-16 PROCEDURE — 80061 LIPID PANEL: CPT

## 2019-04-16 PROCEDURE — 80053 COMPREHEN METABOLIC PANEL: CPT

## 2019-04-16 PROCEDURE — 84597 ASSAY OF VITAMIN K: CPT

## 2019-04-16 PROCEDURE — 82306 VITAMIN D 25 HYDROXY: CPT

## 2019-04-16 PROCEDURE — 84443 ASSAY THYROID STIM HORMONE: CPT

## 2019-04-17 ENCOUNTER — TELEPHONE (OUTPATIENT)
Dept: BARIATRICS/WEIGHT MGMT | Age: 39
End: 2019-04-17

## 2019-04-17 ENCOUNTER — OFFICE VISIT (OUTPATIENT)
Dept: FAMILY MEDICINE CLINIC | Age: 39
End: 2019-04-17
Payer: COMMERCIAL

## 2019-04-17 VITALS
HEART RATE: 80 BPM | WEIGHT: 286 LBS | DIASTOLIC BLOOD PRESSURE: 84 MMHG | BODY MASS INDEX: 35.75 KG/M2 | TEMPERATURE: 98.1 F | SYSTOLIC BLOOD PRESSURE: 124 MMHG

## 2019-04-17 DIAGNOSIS — E03.9 HYPOTHYROIDISM, UNSPECIFIED TYPE: Primary | ICD-10-CM

## 2019-04-17 DIAGNOSIS — E55.9 VITAMIN D DEFICIENCY: Primary | ICD-10-CM

## 2019-04-17 LAB
ESTIMATED AVERAGE GLUCOSE: 105.4 MG/DL
HBA1C MFR BLD: 5.3 %

## 2019-04-17 PROCEDURE — 99213 OFFICE O/P EST LOW 20 MIN: CPT | Performed by: NURSE PRACTITIONER

## 2019-04-17 RX ORDER — LEVOTHYROXINE SODIUM 0.1 MG/1
100 TABLET ORAL DAILY
Qty: 30 TABLET | Refills: 1 | Status: SHIPPED | OUTPATIENT
Start: 2019-04-17 | End: 2019-06-16 | Stop reason: SDUPTHER

## 2019-04-17 RX ORDER — ERGOCALCIFEROL 1.25 MG/1
50000 CAPSULE ORAL WEEKLY
Qty: 4 CAPSULE | Refills: 2 | Status: SHIPPED | OUTPATIENT
Start: 2019-04-17 | End: 2020-02-12 | Stop reason: CLARIF

## 2019-04-17 ASSESSMENT — ENCOUNTER SYMPTOMS
CONSTIPATION: 0
SHORTNESS OF BREATH: 0
WHEEZING: 0
DIARRHEA: 0

## 2019-04-17 ASSESSMENT — PATIENT HEALTH QUESTIONNAIRE - PHQ9
1. LITTLE INTEREST OR PLEASURE IN DOING THINGS: 0
2. FEELING DOWN, DEPRESSED OR HOPELESS: 0
SUM OF ALL RESPONSES TO PHQ9 QUESTIONS 1 & 2: 0
SUM OF ALL RESPONSES TO PHQ QUESTIONS 1-9: 0
SUM OF ALL RESPONSES TO PHQ QUESTIONS 1-9: 0

## 2019-04-17 NOTE — PROGRESS NOTES
Patient: Jasmyne Burnett. is a 43 y.o. male who presents today with the following Chief Complaint(s):  Chief Complaint   Patient presents with    Establish Care         HPI   Dee Obrien is a 43 male who is here to establish care. Former [facility] patient. Here with his wife today. Has gastric sleeve surgery Oct 2018. Has not taken his thyroid meds since then. He states it was difficult to swallow much after surgery so he didn't take it but then just kept not taking it. He was taking 137 mcg x2 tabs daily. He states he feels tired but works 3rd shift so not sure if he feels tired from his job or from his thyroid levels being off. Denies any symptoms. Got some blood work done from the bariatric office yesterday and TSH was  123.70  Current Outpatient Medications   Medication Sig Dispense Refill    vitamin D (ERGOCALCIFEROL) 20889 units CAPS capsule Take 1 capsule by mouth once a week 4 capsule 2    levothyroxine (SYNTHROID) 100 MCG tablet Take 1 tablet by mouth daily 30 tablet 1    Multiple Vitamins-Minerals (BARIATRIC FUSION) CHEW Take 4 tablets by mouth daily       No current facility-administered medications for this visit. Patient's past medical history, surgical history, family history, medications,  andallergies  were all reviewed and updated as appropriate today. Review of Systems   Constitutional: Positive for fatigue (works 3rd shift too). Respiratory: Negative for shortness of breath and wheezing. Cardiovascular: Negative for chest pain and palpitations. Gastrointestinal: Negative for constipation and diarrhea. Physical Exam   Constitutional: He is oriented to person, place, and time. Vital signs are normal. He appears well-developed and well-nourished. HENT:   Head: Normocephalic and atraumatic. Mouth/Throat: No posterior oropharyngeal edema or posterior oropharyngeal erythema. Eyes: Conjunctivae are normal.   Neck: Normal range of motion.    Cardiovascular: Normal

## 2019-04-17 NOTE — TELEPHONE ENCOUNTER
As per last office visit left message for patient in reference to his lab results. Explained that not all his labs have resulted, but wanted to get back to him as his TSH is elevated and it looks like from phone notes with his PCP that he has not been taking his Synthroid. Explained for him to follow up with his PCP to restart on Synthroid. Reviewed Lipid panel which is still elevated, but all numbers are improved from a year ago. His Vitamin D is low so asked for him to call back to verify pharmacy. Reviewed Vitamin B12, Folate, Iron studies, CMP and HgbA1c. Explained that I will call when the remainder of your vitamins result.

## 2019-04-19 LAB
ALPHA-TOCOPHEROL: 8.8 MG/L (ref 5.5–18)
GAMMA-TOCOPHEROL: 1.7 MG/L (ref 0–6)
RETINYL PALMITATE: <0.02 MG/L (ref 0–0.1)
VITAMIN A LEVEL: 0.65 MG/L (ref 0.3–1.2)
VITAMIN A, INTERP: NORMAL
VITAMIN B1 WHOLE BLOOD: 72 NMOL/L (ref 70–180)
VITAMIN K1: 0.4 NMOL/L (ref 0.22–4.88)

## 2019-04-22 ENCOUNTER — TELEPHONE (OUTPATIENT)
Dept: BARIATRICS/WEIGHT MGMT | Age: 39
End: 2019-04-22

## 2019-04-22 NOTE — TELEPHONE ENCOUNTER
As per last office visit and previous phone message from 4/17/2019 left message for patient that the remainder of his Vitamin levels were all normal. If patient has any questions to call the office. Thank you.

## 2019-05-30 ENCOUNTER — TELEPHONE (OUTPATIENT)
Dept: FAMILY MEDICINE CLINIC | Age: 39
End: 2019-05-30

## 2019-05-30 NOTE — TELEPHONE ENCOUNTER
Patient got sunburn on stomach area and over the counter is not helping . Looking deep red still is there something to put on it cannot hardly move.

## 2019-06-16 DIAGNOSIS — E03.9 HYPOTHYROIDISM, UNSPECIFIED TYPE: ICD-10-CM

## 2019-06-17 RX ORDER — LEVOTHYROXINE SODIUM 0.1 MG/1
TABLET ORAL
Qty: 30 TABLET | Refills: 0 | Status: SHIPPED | OUTPATIENT
Start: 2019-06-17 | End: 2019-08-01 | Stop reason: SDUPTHER

## 2019-08-01 DIAGNOSIS — E03.9 HYPOTHYROIDISM, UNSPECIFIED TYPE: ICD-10-CM

## 2019-08-02 RX ORDER — LEVOTHYROXINE SODIUM 0.1 MG/1
TABLET ORAL
Qty: 30 TABLET | Refills: 0 | Status: SHIPPED | OUTPATIENT
Start: 2019-08-02 | End: 2019-08-12 | Stop reason: SDUPTHER

## 2019-08-11 ENCOUNTER — HOSPITAL ENCOUNTER (OUTPATIENT)
Age: 39
Discharge: HOME | End: 2019-08-11
Payer: COMMERCIAL

## 2019-08-11 DIAGNOSIS — E03.9 HYPOTHYROIDISM, UNSPECIFIED TYPE: ICD-10-CM

## 2019-08-11 LAB
T4 FREE: 0.9 NG/DL (ref 0.9–1.8)
TSH SERPL DL<=0.05 MIU/L-ACNC: 26.17 UIU/ML (ref 0.27–4.2)

## 2019-08-11 PROCEDURE — 36415 COLL VENOUS BLD VENIPUNCTURE: CPT

## 2019-08-11 PROCEDURE — 84443 ASSAY THYROID STIM HORMONE: CPT

## 2019-08-11 PROCEDURE — 84439 ASSAY OF FREE THYROXINE: CPT

## 2019-08-12 DIAGNOSIS — E03.9 HYPOTHYROIDISM, UNSPECIFIED TYPE: ICD-10-CM

## 2019-08-12 RX ORDER — LEVOTHYROXINE SODIUM 0.12 MG/1
TABLET ORAL
Qty: 90 TABLET | Refills: 0 | Status: SHIPPED | OUTPATIENT
Start: 2019-08-12 | End: 2019-11-24 | Stop reason: SDUPTHER

## 2019-09-21 ENCOUNTER — OFFICE VISIT (OUTPATIENT)
Dept: ORTHOPEDIC SURGERY | Age: 39
End: 2019-09-21
Payer: COMMERCIAL

## 2019-09-21 VITALS
HEART RATE: 68 BPM | DIASTOLIC BLOOD PRESSURE: 85 MMHG | BODY MASS INDEX: 33.12 KG/M2 | SYSTOLIC BLOOD PRESSURE: 118 MMHG | WEIGHT: 265 LBS

## 2019-09-21 DIAGNOSIS — M25.511 PAIN OF RIGHT SHOULDER REGION: Primary | ICD-10-CM

## 2019-09-21 DIAGNOSIS — S46.911A STRAIN OF RIGHT SHOULDER, INITIAL ENCOUNTER: ICD-10-CM

## 2019-09-21 PROCEDURE — 99203 OFFICE O/P NEW LOW 30 MIN: CPT | Performed by: NURSE PRACTITIONER

## 2019-09-21 RX ORDER — METHYLPREDNISOLONE 4 MG/1
TABLET ORAL
Qty: 1 KIT | Refills: 0 | Status: SHIPPED | OUTPATIENT
Start: 2019-09-21 | End: 2019-09-27

## 2019-09-21 NOTE — PROGRESS NOTES
palpation. Pectoralis  is not tender to palpation. Scapula/ trapezius is not tender to palpation.  -There is not weakness with supraspinatus testing.  -There is  pain with supraspinatus testing.   -Yergason Test negative.    -Drop Arm Test negative. -Apprehension Test negative. -Cross Arm Test negative.    -Supraspinatus Test negative.  -Shoulder ROM-full range of motion    Contralateral Exam:  -No obvious deformities  -No abrasions or cellulitis noted, NVI   -Full ROM   -No joint laxity  -no palpable tenderness noted    Neurological:   -There is not any complaints of numbness and tingling.    -Motor and sensory is at median, radial and ulnar nerve distributions. -NVI to upper extremities bilaterally. Skin:  No abrasions, lesions, cellulitic changes, obvious deformities noted     Xray:  4 view (AP/Lat/Axillary/Y) of right shoulder show: No acute fractures or deformities    Assessment: Right shoulder strain    Plan: The patient reports that he believes he is unable to take NSAIDs due to a history of gastric bypass surgery. He will clarify this with his bariatric surgeon. If he is unable to take NSAIDs, he was given a prescription for a Medrol Dosepak. He was encouraged to rest and ice his shoulder. He will follow-up with either Dr. Charlie Carlson or Dr. Pedro Rubalcava as needed in 2 weeks. No orders of the defined types were placed in this encounter.

## 2019-09-30 ENCOUNTER — OFFICE VISIT (OUTPATIENT)
Dept: ORTHOPEDIC SURGERY | Age: 39
End: 2019-09-30
Payer: COMMERCIAL

## 2019-09-30 VITALS
DIASTOLIC BLOOD PRESSURE: 74 MMHG | HEIGHT: 75 IN | SYSTOLIC BLOOD PRESSURE: 130 MMHG | WEIGHT: 264.99 LBS | BODY MASS INDEX: 32.95 KG/M2 | HEART RATE: 76 BPM

## 2019-09-30 DIAGNOSIS — S46.911A STRAIN OF RIGHT SHOULDER, INITIAL ENCOUNTER: ICD-10-CM

## 2019-09-30 DIAGNOSIS — M25.511 PAIN OF RIGHT SHOULDER REGION: Primary | ICD-10-CM

## 2019-09-30 PROCEDURE — 99213 OFFICE O/P EST LOW 20 MIN: CPT | Performed by: ORTHOPAEDIC SURGERY

## 2019-10-08 ENCOUNTER — TELEPHONE (OUTPATIENT)
Dept: ORTHOPEDIC SURGERY | Age: 39
End: 2019-10-08

## 2019-10-09 ENCOUNTER — TELEPHONE (OUTPATIENT)
Dept: ORTHOPEDIC SURGERY | Age: 39
End: 2019-10-09

## 2019-11-08 ENCOUNTER — HOSPITAL ENCOUNTER (OUTPATIENT)
Age: 39
Discharge: HOME | End: 2019-11-08
Payer: COMMERCIAL

## 2019-11-08 ENCOUNTER — OFFICE VISIT (OUTPATIENT)
Dept: FAMILY MEDICINE CLINIC | Age: 39
End: 2019-11-08
Payer: COMMERCIAL

## 2019-11-08 ENCOUNTER — HOSPITAL ENCOUNTER (OUTPATIENT)
Dept: GENERAL RADIOLOGY | Age: 39
Discharge: HOME | End: 2019-11-08
Payer: COMMERCIAL

## 2019-11-08 VITALS
DIASTOLIC BLOOD PRESSURE: 72 MMHG | WEIGHT: 270 LBS | SYSTOLIC BLOOD PRESSURE: 100 MMHG | HEIGHT: 75 IN | RESPIRATION RATE: 16 BRPM | OXYGEN SATURATION: 96 % | TEMPERATURE: 101.8 F | BODY MASS INDEX: 33.57 KG/M2 | HEART RATE: 106 BPM

## 2019-11-08 DIAGNOSIS — R05.9 COUGH: ICD-10-CM

## 2019-11-08 DIAGNOSIS — R68.89 FLU-LIKE SYMPTOMS: ICD-10-CM

## 2019-11-08 DIAGNOSIS — R50.9 FEVER, UNSPECIFIED FEVER CAUSE: Primary | ICD-10-CM

## 2019-11-08 DIAGNOSIS — J18.9 COMMUNITY ACQUIRED PNEUMONIA, UNSPECIFIED LATERALITY: ICD-10-CM

## 2019-11-08 LAB
INFLUENZA A ANTIGEN, POC: NEGATIVE
INFLUENZA B ANTIGEN, POC: NEGATIVE

## 2019-11-08 PROCEDURE — 71046 X-RAY EXAM CHEST 2 VIEWS: CPT

## 2019-11-08 PROCEDURE — 87804 INFLUENZA ASSAY W/OPTIC: CPT | Performed by: FAMILY MEDICINE

## 2019-11-08 PROCEDURE — 99214 OFFICE O/P EST MOD 30 MIN: CPT | Performed by: FAMILY MEDICINE

## 2019-11-08 RX ORDER — DOXYCYCLINE HYCLATE 100 MG/1
100 CAPSULE ORAL 2 TIMES DAILY
Qty: 20 CAPSULE | Refills: 0 | Status: SHIPPED | OUTPATIENT
Start: 2019-11-08 | End: 2019-11-18

## 2019-11-08 RX ORDER — BENZONATATE 100 MG/1
100-200 CAPSULE ORAL 2 TIMES DAILY PRN
Qty: 60 CAPSULE | Refills: 0 | Status: SHIPPED | OUTPATIENT
Start: 2019-11-08 | End: 2019-11-15

## 2019-11-11 ENCOUNTER — TELEPHONE (OUTPATIENT)
Dept: FAMILY MEDICINE CLINIC | Age: 39
End: 2019-11-11

## 2019-11-24 DIAGNOSIS — E03.9 HYPOTHYROIDISM, UNSPECIFIED TYPE: ICD-10-CM

## 2019-11-25 RX ORDER — LEVOTHYROXINE SODIUM 0.12 MG/1
TABLET ORAL
Qty: 90 TABLET | Refills: 0 | Status: SHIPPED
Start: 2019-11-25 | End: 2020-02-13 | Stop reason: DRUGHIGH

## 2020-02-12 ENCOUNTER — OFFICE VISIT (OUTPATIENT)
Dept: FAMILY MEDICINE CLINIC | Age: 40
End: 2020-02-12
Payer: COMMERCIAL

## 2020-02-12 VITALS
OXYGEN SATURATION: 98 % | HEART RATE: 75 BPM | BODY MASS INDEX: 33.8 KG/M2 | WEIGHT: 270.4 LBS | TEMPERATURE: 98.8 F | SYSTOLIC BLOOD PRESSURE: 118 MMHG | DIASTOLIC BLOOD PRESSURE: 84 MMHG

## 2020-02-12 PROCEDURE — G8431 POS CLIN DEPRES SCRN F/U DOC: HCPCS | Performed by: NURSE PRACTITIONER

## 2020-02-12 PROCEDURE — 99214 OFFICE O/P EST MOD 30 MIN: CPT | Performed by: NURSE PRACTITIONER

## 2020-02-12 RX ORDER — BUPROPION HYDROCHLORIDE 150 MG/1
150 TABLET, EXTENDED RELEASE ORAL 2 TIMES DAILY
Qty: 60 TABLET | Refills: 1 | Status: SHIPPED | OUTPATIENT
Start: 2020-02-12 | End: 2020-04-13

## 2020-02-12 RX ORDER — BUSPIRONE HYDROCHLORIDE 10 MG/1
10 TABLET ORAL 3 TIMES DAILY PRN
Qty: 90 TABLET | Refills: 0 | Status: SHIPPED | OUTPATIENT
Start: 2020-02-12 | End: 2020-03-12

## 2020-02-12 ASSESSMENT — ENCOUNTER SYMPTOMS
EYE DISCHARGE: 0
COUGH: 0
CHEST TIGHTNESS: 0
CONSTIPATION: 0
COLOR CHANGE: 0
SORE THROAT: 0
TROUBLE SWALLOWING: 0
DIARRHEA: 0
SHORTNESS OF BREATH: 0
SINUS PRESSURE: 0
BACK PAIN: 0
ABDOMINAL PAIN: 0
NAUSEA: 0
SINUS PAIN: 0

## 2020-02-12 ASSESSMENT — PATIENT HEALTH QUESTIONNAIRE - PHQ9
SUM OF ALL RESPONSES TO PHQ9 QUESTIONS 1 & 2: 6
SUM OF ALL RESPONSES TO PHQ QUESTIONS 1-9: 27
6. FEELING BAD ABOUT YOURSELF - OR THAT YOU ARE A FAILURE OR HAVE LET YOURSELF OR YOUR FAMILY DOWN: 3
SUM OF ALL RESPONSES TO PHQ QUESTIONS 1-9: 27
5. POOR APPETITE OR OVEREATING: 3
3. TROUBLE FALLING OR STAYING ASLEEP: 3
10. IF YOU CHECKED OFF ANY PROBLEMS, HOW DIFFICULT HAVE THESE PROBLEMS MADE IT FOR YOU TO DO YOUR WORK, TAKE CARE OF THINGS AT HOME, OR GET ALONG WITH OTHER PEOPLE: 1
8. MOVING OR SPEAKING SO SLOWLY THAT OTHER PEOPLE COULD HAVE NOTICED. OR THE OPPOSITE, BEING SO FIGETY OR RESTLESS THAT YOU HAVE BEEN MOVING AROUND A LOT MORE THAN USUAL: 3
9. THOUGHTS THAT YOU WOULD BE BETTER OFF DEAD, OR OF HURTING YOURSELF: 3
7. TROUBLE CONCENTRATING ON THINGS, SUCH AS READING THE NEWSPAPER OR WATCHING TELEVISION: 3
1. LITTLE INTEREST OR PLEASURE IN DOING THINGS: 3
4. FEELING TIRED OR HAVING LITTLE ENERGY: 3
2. FEELING DOWN, DEPRESSED OR HOPELESS: 3

## 2020-02-13 LAB
T4 FREE: 1.2 NG/DL (ref 0.9–1.8)
TSH REFLEX FT4: 9.64 UIU/ML (ref 0.27–4.2)

## 2020-02-13 RX ORDER — LEVOTHYROXINE SODIUM 137 UG/1
137 TABLET ORAL DAILY
Qty: 90 TABLET | Refills: 0 | Status: SHIPPED | OUTPATIENT
Start: 2020-02-13 | End: 2020-05-12 | Stop reason: SDUPTHER

## 2020-03-12 RX ORDER — BUSPIRONE HYDROCHLORIDE 10 MG/1
TABLET ORAL
Qty: 90 TABLET | Refills: 0 | Status: SHIPPED | OUTPATIENT
Start: 2020-03-12 | End: 2020-04-20 | Stop reason: SDUPTHER

## 2020-03-17 ENCOUNTER — TELEPHONE (OUTPATIENT)
Dept: PULMONOLOGY | Age: 40
End: 2020-03-17

## 2020-03-17 NOTE — TELEPHONE ENCOUNTER
My Chart Message:         Appointment canceled for Elias Cuevas. ([id number])  Visit Type: OFFICE VISIT  Date        Time      Length    Provider                  Department  3/25/2020   10:30 AM  15 mins. Santosh Randolph MD               [address] [facility]     Reason for Cancellation: Work/School        This MyChart message has not been read. 03/25/19        · Mild VANDANA. CPAP 12 cm H2O. Optimal compliance and efficacy upon review today. · Morbid obesity- gastric sleeve 10/8/2018 lost 55 pounds   · Third shift worker   · 8 pack year smoking- quit 1/2018       Plan:       · Continue CPAP 6-8 hrs at night and during naps. · Replacement of mask, tubing, head straps every 3-6 months or sooner if damaged. · Follow up CPAP compliance and pressure adjustment if needed  · Sleep hygiene related to third shift working discussed with patient  · Avoid sedatives, alcohol and caffeinated drinks at bed time. · No driving motorized vehicles or operating heavy machinery while fatigue, drowsy or sleepy.    · Continue with weight loss which will help improve VANDANA  · Advised to continue with smoking cessation.

## 2020-04-13 RX ORDER — BUPROPION HYDROCHLORIDE 150 MG/1
TABLET, EXTENDED RELEASE ORAL
Qty: 60 TABLET | Refills: 5 | Status: SHIPPED | OUTPATIENT
Start: 2020-04-13 | End: 2020-04-20 | Stop reason: DRUGHIGH

## 2020-04-20 ENCOUNTER — VIRTUAL VISIT (OUTPATIENT)
Dept: FAMILY MEDICINE CLINIC | Age: 40
End: 2020-04-20
Payer: COMMERCIAL

## 2020-04-20 PROCEDURE — G0444 DEPRESSION SCREEN ANNUAL: HCPCS | Performed by: NURSE PRACTITIONER

## 2020-04-20 PROCEDURE — 99213 OFFICE O/P EST LOW 20 MIN: CPT | Performed by: NURSE PRACTITIONER

## 2020-04-20 RX ORDER — BUPROPION HYDROCHLORIDE 300 MG/1
300 TABLET ORAL EVERY MORNING
Qty: 90 TABLET | Refills: 1 | Status: SHIPPED | OUTPATIENT
Start: 2020-04-20 | End: 2020-04-21

## 2020-04-20 RX ORDER — BUSPIRONE HYDROCHLORIDE 10 MG/1
TABLET ORAL
Qty: 90 TABLET | Refills: 5 | Status: SHIPPED | OUTPATIENT
Start: 2020-04-20 | End: 2020-11-04

## 2020-04-20 ASSESSMENT — PATIENT HEALTH QUESTIONNAIRE - PHQ9
SUM OF ALL RESPONSES TO PHQ9 QUESTIONS 1 & 2: 4
3. TROUBLE FALLING OR STAYING ASLEEP: 3
6. FEELING BAD ABOUT YOURSELF - OR THAT YOU ARE A FAILURE OR HAVE LET YOURSELF OR YOUR FAMILY DOWN: 3
10. IF YOU CHECKED OFF ANY PROBLEMS, HOW DIFFICULT HAVE THESE PROBLEMS MADE IT FOR YOU TO DO YOUR WORK, TAKE CARE OF THINGS AT HOME, OR GET ALONG WITH OTHER PEOPLE: 2
9. THOUGHTS THAT YOU WOULD BE BETTER OFF DEAD, OR OF HURTING YOURSELF: 0
1. LITTLE INTEREST OR PLEASURE IN DOING THINGS: 2
4. FEELING TIRED OR HAVING LITTLE ENERGY: 3
2. FEELING DOWN, DEPRESSED OR HOPELESS: 2
5. POOR APPETITE OR OVEREATING: 3
SUM OF ALL RESPONSES TO PHQ QUESTIONS 1-9: 19
7. TROUBLE CONCENTRATING ON THINGS, SUCH AS READING THE NEWSPAPER OR WATCHING TELEVISION: 3
8. MOVING OR SPEAKING SO SLOWLY THAT OTHER PEOPLE COULD HAVE NOTICED. OR THE OPPOSITE, BEING SO FIGETY OR RESTLESS THAT YOU HAVE BEEN MOVING AROUND A LOT MORE THAN USUAL: 0
SUM OF ALL RESPONSES TO PHQ QUESTIONS 1-9: 19

## 2020-04-20 ASSESSMENT — ENCOUNTER SYMPTOMS
WHEEZING: 0
COUGH: 0
SHORTNESS OF BREATH: 0

## 2020-04-20 NOTE — PROGRESS NOTES
of difficulty breathing or respiratory distress        [] Abnormal-      Musculoskeletal:   [] Normal gait with no signs of ataxia         [x] Normal range of motion of neck        [] Abnormal-       Neurological:        [x] No Facial Asymmetry (Cranial nerve 7 motor function) (limited exam to video visit)          [x] No gaze palsy        [] Abnormal-         Skin:        [x] No significant exanthematous lesions or discoloration noted on facial skin         [] Abnormal-            Psychiatric:       [x] Normal Affect [x] No Hallucinations        [] Abnormal-     Other pertinent observable physical exam findings-     ASSESSMENT/PLAN:  1. Anxiety    Discussed can increase buspar to 10 mg TID- max daily dose of 60mg discussed  Discussed making appt with Dr. Janese Ahumada as well    - busPIRone (BUSPAR) 10 MG tablet; TAKE 1 TABLET BY MOUTH THREE TIMES A DAY AS NEEDED FOR ANXIETY  Dispense: 90 tablet; Refill: 5    2. Severe episode of recurrent major depressive disorder, without psychotic features ([address]  Increased med  Discussed for him to ask his mom and sister what they take for depression- may help him as well  - buPROPion (WELLBUTRIN XL) 300 MG extended release tablet; Take 1 tablet by mouth every morning  Dispense: 90 tablet; Refill: 1      No follow-ups on file. Olayinka Thompson. is a 35 y.o. male being evaluated by a Virtual Visit (video visit) encounter to address concerns as mentioned above. A caregiver was present when appropriate. Due to this being a TeleHealth encounter (During TUXTX-29 public health emergency), evaluation of the following organ systems was limited: Vitals/Constitutional/EENT/Resp/CV/GI//MS/Neuro/Skin/Heme-Lymph-Imm.   Pursuant to the emergency declaration under the [facility], [address] [place] [address] authority and the [employer] and [employer] Act, this Virtual Visit was conducted with patient's (and/or legal guardian's)

## 2020-04-21 ENCOUNTER — TELEPHONE (OUTPATIENT)
Dept: FAMILY MEDICINE CLINIC | Age: 40
End: 2020-04-21

## 2020-04-21 ENCOUNTER — PATIENT MESSAGE (OUTPATIENT)
Dept: FAMILY MEDICINE CLINIC | Age: 40
End: 2020-04-21

## 2020-04-21 RX ORDER — BUPROPION HYDROCHLORIDE 100 MG/1
100 TABLET, EXTENDED RELEASE ORAL DAILY
Qty: 30 TABLET | Refills: 3 | Status: SHIPPED | OUTPATIENT
Start: 2020-04-21 | End: 2020-04-23 | Stop reason: SDUPTHER

## 2020-04-21 RX ORDER — BUPROPION HYDROCHLORIDE 150 MG/1
150 TABLET, EXTENDED RELEASE ORAL 2 TIMES DAILY
Qty: 60 TABLET | Refills: 3 | Status: SHIPPED | OUTPATIENT
Start: 2020-04-21 | End: 2020-04-23 | Stop reason: ALTCHOICE

## 2020-04-21 NOTE — TELEPHONE ENCOUNTER
From: Yoselyn Allen. To: Lorie Mcfarlane APRN - CNP  Sent: 4/21/2020 1:04 PM EDT  Subject: Prescription Question    Mary Motley,     We had a web visit yesterday and increased my Welbutrin SR. I was on 150mg BID and when I went to get it I realized the new increased dose was filled for Welbutrin  daily. I am unable to take extended release due to my gastric sleeve surgery. I called in this morning and requested it be changed to the SR. Looking in My Chart, I am back on the 150mg BID (which was what I was originally on). Is there not a higher dose for this since we discussed Increasing it?      Thanks,   [employer]

## 2020-04-23 RX ORDER — BUPROPION HYDROCHLORIDE 100 MG/1
TABLET, EXTENDED RELEASE ORAL
Qty: 120 TABLET | Refills: 3 | Status: ON HOLD | OUTPATIENT
Start: 2020-04-23 | End: 2020-05-11 | Stop reason: HOSPADM

## 2020-05-06 ENCOUNTER — NURSE TRIAGE (OUTPATIENT)
Dept: OTHER | Facility: CLINIC | Age: 40
End: 2020-05-06

## 2020-05-07 ENCOUNTER — OFFICE VISIT (OUTPATIENT)
Dept: PRIMARY CARE CLINIC | Age: 40
End: 2020-05-07
Payer: COMMERCIAL

## 2020-05-07 VITALS — TEMPERATURE: 98.5 F | HEART RATE: 76 BPM | OXYGEN SATURATION: 96 %

## 2020-05-07 PROCEDURE — 1036F TOBACCO NON-USER: CPT | Performed by: NURSE PRACTITIONER

## 2020-05-07 PROCEDURE — G8428 CUR MEDS NOT DOCUMENT: HCPCS | Performed by: NURSE PRACTITIONER

## 2020-05-07 PROCEDURE — G8417 CALC BMI ABV UP PARAM F/U: HCPCS | Performed by: NURSE PRACTITIONER

## 2020-05-07 PROCEDURE — 99212 OFFICE O/P EST SF 10 MIN: CPT | Performed by: NURSE PRACTITIONER

## 2020-05-09 ENCOUNTER — HOSPITAL ENCOUNTER (INPATIENT)
Age: 40
LOS: 2 days | Discharge: HOME | DRG: 103 | End: 2020-05-11
Attending: INTERNAL MEDICINE | Admitting: INTERNAL MEDICINE
Payer: COMMERCIAL

## 2020-05-09 ENCOUNTER — APPOINTMENT (OUTPATIENT)
Dept: MRI IMAGING | Age: 40
End: 2020-05-09
Payer: COMMERCIAL

## 2020-05-09 ENCOUNTER — APPOINTMENT (OUTPATIENT)
Dept: CT IMAGING | Age: 40
End: 2020-05-09
Payer: COMMERCIAL

## 2020-05-09 ENCOUNTER — APPOINTMENT (OUTPATIENT)
Dept: GENERAL RADIOLOGY | Age: 40
End: 2020-05-09
Payer: COMMERCIAL

## 2020-05-09 ENCOUNTER — HOSPITAL ENCOUNTER (EMERGENCY)
Age: 40
Discharge: ANOTHER HOSPITAL | End: 2020-05-09
Attending: EMERGENCY MEDICINE
Payer: COMMERCIAL

## 2020-05-09 VITALS
RESPIRATION RATE: 12 BRPM | SYSTOLIC BLOOD PRESSURE: 130 MMHG | HEART RATE: 94 BPM | BODY MASS INDEX: 35.21 KG/M2 | HEIGHT: 75 IN | WEIGHT: 283.19 LBS | TEMPERATURE: 97.9 F | DIASTOLIC BLOOD PRESSURE: 83 MMHG | OXYGEN SATURATION: 98 %

## 2020-05-09 PROBLEM — G83.10 LOWER EXTREMITY PARALYSIS (HCC): Status: ACTIVE | Noted: 2020-05-09

## 2020-05-09 LAB
A/G RATIO: 1.4 (ref 1.1–2.2)
ALBUMIN SERPL-MCNC: 4.2 G/DL (ref 3.4–5)
ALP BLD-CCNC: 49 U/L (ref 40–129)
ALT SERPL-CCNC: 16 U/L (ref 10–40)
AMPHETAMINE SCREEN, URINE: NORMAL
ANION GAP SERPL CALCULATED.3IONS-SCNC: 13 MMOL/L (ref 3–16)
ANION GAP SERPL CALCULATED.3IONS-SCNC: 14 MMOL/L (ref 3–16)
AST SERPL-CCNC: 18 U/L (ref 15–37)
BARBITURATE SCREEN URINE: NORMAL
BASOPHILS ABSOLUTE: 0.1 K/UL (ref 0–0.2)
BASOPHILS RELATIVE PERCENT: 0.9 %
BENZODIAZEPINE SCREEN, URINE: NORMAL
BILIRUB SERPL-MCNC: 0.5 MG/DL (ref 0–1)
BILIRUBIN URINE: NEGATIVE
BLOOD, URINE: NEGATIVE
BUN BLDV-MCNC: 14 MG/DL (ref 7–20)
BUN BLDV-MCNC: 17 MG/DL (ref 7–20)
CALCIUM SERPL-MCNC: 9 MG/DL (ref 8.3–10.6)
CALCIUM SERPL-MCNC: 9.3 MG/DL (ref 8.3–10.6)
CANNABINOID SCREEN URINE: NORMAL
CHLORIDE BLD-SCNC: 100 MMOL/L (ref 99–110)
CHLORIDE BLD-SCNC: 102 MMOL/L (ref 99–110)
CLARITY: CLEAR
CO2: 23 MMOL/L (ref 21–32)
CO2: 26 MMOL/L (ref 21–32)
COCAINE METABOLITE SCREEN URINE: NORMAL
COLOR: YELLOW
CREAT SERPL-MCNC: 0.8 MG/DL (ref 0.9–1.3)
CREAT SERPL-MCNC: 0.8 MG/DL (ref 0.9–1.3)
EKG ATRIAL RATE: 75 BPM
EKG ATRIAL RATE: 75 BPM
EKG DIAGNOSIS: NORMAL
EKG DIAGNOSIS: NORMAL
EKG P AXIS: 52 DEGREES
EKG P AXIS: 62 DEGREES
EKG P-R INTERVAL: 152 MS
EKG P-R INTERVAL: 154 MS
EKG Q-T INTERVAL: 374 MS
EKG Q-T INTERVAL: 376 MS
EKG QRS DURATION: 90 MS
EKG QRS DURATION: 92 MS
EKG QTC CALCULATION (BAZETT): 417 MS
EKG QTC CALCULATION (BAZETT): 419 MS
EKG R AXIS: 46 DEGREES
EKG R AXIS: 59 DEGREES
EKG T AXIS: 34 DEGREES
EKG T AXIS: 48 DEGREES
EKG VENTRICULAR RATE: 75 BPM
EKG VENTRICULAR RATE: 75 BPM
EOSINOPHILS ABSOLUTE: 0.2 K/UL (ref 0–0.6)
EOSINOPHILS RELATIVE PERCENT: 2.8 %
GFR AFRICAN AMERICAN: >60
GFR AFRICAN AMERICAN: >60
GFR NON-AFRICAN AMERICAN: >60
GFR NON-AFRICAN AMERICAN: >60
GLOBULIN: 3 G/DL
GLUCOSE BLD-MCNC: 100 MG/DL (ref 70–99)
GLUCOSE BLD-MCNC: 98 MG/DL (ref 70–99)
GLUCOSE URINE: NEGATIVE MG/DL
HCT VFR BLD CALC: 46 % (ref 40.5–52.5)
HEMOGLOBIN: 15.4 G/DL (ref 13.5–17.5)
KETONES, URINE: NEGATIVE MG/DL
LACTIC ACID: 2.2 MMOL/L (ref 0.4–2)
LACTIC ACID: 2.8 MMOL/L (ref 0.4–2)
LEUKOCYTE ESTERASE, URINE: NEGATIVE
LYMPHOCYTES ABSOLUTE: 2.8 K/UL (ref 1–5.1)
LYMPHOCYTES RELATIVE PERCENT: 43 %
Lab: NORMAL
MCH RBC QN AUTO: 29.6 PG (ref 26–34)
MCHC RBC AUTO-ENTMCNC: 33.4 G/DL (ref 31–36)
MCV RBC AUTO: 88.5 FL (ref 80–100)
METHADONE SCREEN, URINE: NORMAL
MICROSCOPIC EXAMINATION: NORMAL
MONOCYTES ABSOLUTE: 0.6 K/UL (ref 0–1.3)
MONOCYTES RELATIVE PERCENT: 9.6 %
NEUTROPHILS ABSOLUTE: 2.9 K/UL (ref 1.7–7.7)
NEUTROPHILS RELATIVE PERCENT: 43.7 %
NITRITE, URINE: NEGATIVE
OPIATE SCREEN URINE: NORMAL
OXYCODONE URINE: NORMAL
PDW BLD-RTO: 13.7 % (ref 12.4–15.4)
PH UA: 5
PH UA: 6 (ref 5–8)
PHENCYCLIDINE SCREEN URINE: NORMAL
PLATELET # BLD: 208 K/UL (ref 135–450)
PMV BLD AUTO: 7.8 FL (ref 5–10.5)
POTASSIUM REFLEX MAGNESIUM: 4.4 MMOL/L (ref 3.5–5.1)
POTASSIUM REFLEX MAGNESIUM: 4.7 MMOL/L (ref 3.5–5.1)
PRO-BNP: 18 PG/ML (ref 0–124)
PROPOXYPHENE SCREEN: NORMAL
PROTEIN UA: NEGATIVE MG/DL
RBC # BLD: 5.2 M/UL (ref 4.2–5.9)
SARS-COV-2: NOT DETECTED
SODIUM BLD-SCNC: 137 MMOL/L (ref 136–145)
SODIUM BLD-SCNC: 141 MMOL/L (ref 136–145)
SOURCE: NORMAL
SPECIFIC GRAVITY UA: 1.02 (ref 1–1.03)
TOTAL CK: 116 U/L (ref 39–308)
TOTAL PROTEIN: 7.2 G/DL (ref 6.4–8.2)
TROPONIN: <0.01 NG/ML
URINE TYPE: NORMAL
UROBILINOGEN, URINE: 0.2 E.U./DL
WBC # BLD: 6.5 K/UL (ref 4–11)

## 2020-05-09 PROCEDURE — 84443 ASSAY THYROID STIM HORMONE: CPT

## 2020-05-09 PROCEDURE — 2580000003 HC RX 258: Performed by: STUDENT IN AN ORGANIZED HEALTH CARE EDUCATION/TRAINING PROGRAM

## 2020-05-09 PROCEDURE — A9579 GAD-BASE MR CONTRAST NOS,1ML: HCPCS | Performed by: PHYSICIAN ASSISTANT

## 2020-05-09 PROCEDURE — 93010 ELECTROCARDIOGRAM REPORT: CPT | Performed by: INTERNAL MEDICINE

## 2020-05-09 PROCEDURE — 71045 X-RAY EXAM CHEST 1 VIEW: CPT

## 2020-05-09 PROCEDURE — 80048 BASIC METABOLIC PNL TOTAL CA: CPT

## 2020-05-09 PROCEDURE — 80307 DRUG TEST PRSMV CHEM ANLYZR: CPT

## 2020-05-09 PROCEDURE — 87040 BLOOD CULTURE FOR BACTERIA: CPT

## 2020-05-09 PROCEDURE — 72156 MRI NECK SPINE W/O & W/DYE: CPT

## 2020-05-09 PROCEDURE — 70553 MRI BRAIN STEM W/O & W/DYE: CPT

## 2020-05-09 PROCEDURE — 81003 URINALYSIS AUTO W/O SCOPE: CPT

## 2020-05-09 PROCEDURE — 96375 TX/PRO/DX INJ NEW DRUG ADDON: CPT

## 2020-05-09 PROCEDURE — 96368 THER/DIAG CONCURRENT INF: CPT

## 2020-05-09 PROCEDURE — 2060000000 HC ICU INTERMEDIATE R&B

## 2020-05-09 PROCEDURE — 72158 MRI LUMBAR SPINE W/O & W/DYE: CPT

## 2020-05-09 PROCEDURE — 84484 ASSAY OF TROPONIN QUANT: CPT

## 2020-05-09 PROCEDURE — 36415 COLL VENOUS BLD VENIPUNCTURE: CPT

## 2020-05-09 PROCEDURE — 83880 ASSAY OF NATRIURETIC PEPTIDE: CPT

## 2020-05-09 PROCEDURE — 6360000004 HC RX CONTRAST MEDICATION: Performed by: EMERGENCY MEDICINE

## 2020-05-09 PROCEDURE — 70450 CT HEAD/BRAIN W/O DYE: CPT

## 2020-05-09 PROCEDURE — 6360000002 HC RX W HCPCS: Performed by: PHYSICIAN ASSISTANT

## 2020-05-09 PROCEDURE — 83605 ASSAY OF LACTIC ACID: CPT

## 2020-05-09 PROCEDURE — 2580000003 HC RX 258: Performed by: PHYSICIAN ASSISTANT

## 2020-05-09 PROCEDURE — U0003 INFECTIOUS AGENT DETECTION BY NUCLEIC ACID (DNA OR RNA); SEVERE ACUTE RESPIRATORY SYNDROME CORONAVIRUS 2 (SARS-COV-2) (CORONAVIRUS DISEASE [COVID-19]), AMPLIFIED PROBE TECHNIQUE, MAKING USE OF HIGH THROUGHPUT TECHNOLOGIES AS DESCRIBED BY CMS-2020-01-R: HCPCS

## 2020-05-09 PROCEDURE — 2580000003 HC RX 258: Performed by: EMERGENCY MEDICINE

## 2020-05-09 PROCEDURE — 71275 CT ANGIOGRAPHY CHEST: CPT

## 2020-05-09 PROCEDURE — 85025 COMPLETE CBC W/AUTO DIFF WBC: CPT

## 2020-05-09 PROCEDURE — 72157 MRI CHEST SPINE W/O & W/DYE: CPT

## 2020-05-09 PROCEDURE — 82550 ASSAY OF CK (CPK): CPT

## 2020-05-09 PROCEDURE — 96365 THER/PROPH/DIAG IV INF INIT: CPT

## 2020-05-09 PROCEDURE — 99285 EMERGENCY DEPT VISIT HI MDM: CPT

## 2020-05-09 PROCEDURE — 6360000004 HC RX CONTRAST MEDICATION: Performed by: PHYSICIAN ASSISTANT

## 2020-05-09 PROCEDURE — 96367 TX/PROPH/DG ADDL SEQ IV INF: CPT

## 2020-05-09 PROCEDURE — 93005 ELECTROCARDIOGRAM TRACING: CPT | Performed by: EMERGENCY MEDICINE

## 2020-05-09 PROCEDURE — 70498 CT ANGIOGRAPHY NECK: CPT

## 2020-05-09 PROCEDURE — 80053 COMPREHEN METABOLIC PANEL: CPT

## 2020-05-09 PROCEDURE — 96366 THER/PROPH/DIAG IV INF ADDON: CPT

## 2020-05-09 RX ORDER — ACETAMINOPHEN 325 MG/1
650 TABLET ORAL EVERY 6 HOURS PRN
Status: DISCONTINUED | OUTPATIENT
Start: 2020-05-09 | End: 2020-05-11 | Stop reason: HOSPADM

## 2020-05-09 RX ORDER — SODIUM CHLORIDE 9 MG/ML
INJECTION, SOLUTION INTRAVENOUS CONTINUOUS
Status: DISCONTINUED | OUTPATIENT
Start: 2020-05-09 | End: 2020-05-10

## 2020-05-09 RX ORDER — SODIUM CHLORIDE 0.9 % (FLUSH) 0.9 %
10 SYRINGE (ML) INJECTION PRN
Status: DISCONTINUED | OUTPATIENT
Start: 2020-05-09 | End: 2020-05-11 | Stop reason: HOSPADM

## 2020-05-09 RX ORDER — SODIUM CHLORIDE 0.9 % (FLUSH) 0.9 %
10 SYRINGE (ML) INJECTION EVERY 12 HOURS SCHEDULED
Status: DISCONTINUED | OUTPATIENT
Start: 2020-05-09 | End: 2020-05-11 | Stop reason: HOSPADM

## 2020-05-09 RX ORDER — BUPROPION HYDROCHLORIDE 150 MG/1
300 TABLET, EXTENDED RELEASE ORAL DAILY
Status: DISCONTINUED | OUTPATIENT
Start: 2020-05-10 | End: 2020-05-10

## 2020-05-09 RX ORDER — LEVOTHYROXINE SODIUM 137 UG/1
137 TABLET ORAL DAILY
Status: DISCONTINUED | OUTPATIENT
Start: 2020-05-10 | End: 2020-05-11 | Stop reason: HOSPADM

## 2020-05-09 RX ORDER — KETOROLAC TROMETHAMINE 30 MG/ML
15 INJECTION, SOLUTION INTRAMUSCULAR; INTRAVENOUS ONCE
Status: COMPLETED | OUTPATIENT
Start: 2020-05-09 | End: 2020-05-09

## 2020-05-09 RX ORDER — POLYETHYLENE GLYCOL 3350 17 G/17G
17 POWDER, FOR SOLUTION ORAL DAILY PRN
Status: DISCONTINUED | OUTPATIENT
Start: 2020-05-09 | End: 2020-05-11 | Stop reason: HOSPADM

## 2020-05-09 RX ORDER — ONDANSETRON 2 MG/ML
4 INJECTION INTRAMUSCULAR; INTRAVENOUS EVERY 6 HOURS PRN
Status: DISCONTINUED | OUTPATIENT
Start: 2020-05-09 | End: 2020-05-11 | Stop reason: HOSPADM

## 2020-05-09 RX ORDER — ACETAMINOPHEN 650 MG/1
650 SUPPOSITORY RECTAL EVERY 6 HOURS PRN
Status: DISCONTINUED | OUTPATIENT
Start: 2020-05-09 | End: 2020-05-11 | Stop reason: HOSPADM

## 2020-05-09 RX ORDER — 0.9 % SODIUM CHLORIDE 0.9 %
500 INTRAVENOUS SOLUTION INTRAVENOUS ONCE
Status: COMPLETED | OUTPATIENT
Start: 2020-05-09 | End: 2020-05-09

## 2020-05-09 RX ORDER — 0.9 % SODIUM CHLORIDE 0.9 %
1000 INTRAVENOUS SOLUTION INTRAVENOUS ONCE
Status: COMPLETED | OUTPATIENT
Start: 2020-05-09 | End: 2020-05-09

## 2020-05-09 RX ORDER — DIPHENHYDRAMINE HYDROCHLORIDE 50 MG/ML
12.5 INJECTION INTRAMUSCULAR; INTRAVENOUS ONCE
Status: COMPLETED | OUTPATIENT
Start: 2020-05-09 | End: 2020-05-09

## 2020-05-09 RX ORDER — METOCLOPRAMIDE HYDROCHLORIDE 5 MG/ML
10 INJECTION INTRAMUSCULAR; INTRAVENOUS ONCE
Status: COMPLETED | OUTPATIENT
Start: 2020-05-09 | End: 2020-05-09

## 2020-05-09 RX ORDER — PROMETHAZINE HYDROCHLORIDE 25 MG/1
12.5 TABLET ORAL EVERY 6 HOURS PRN
Status: DISCONTINUED | OUTPATIENT
Start: 2020-05-09 | End: 2020-05-11 | Stop reason: HOSPADM

## 2020-05-09 RX ORDER — MULTIVITAMIN WITH IRON
TABLET ORAL DAILY
Status: DISCONTINUED | OUTPATIENT
Start: 2020-05-10 | End: 2020-05-11 | Stop reason: HOSPADM

## 2020-05-09 RX ADMIN — GADOTERIDOL 20 ML: 279.3 INJECTION, SOLUTION INTRAVENOUS at 18:35

## 2020-05-09 RX ADMIN — VANCOMYCIN HYDROCHLORIDE 1000 MG: 1 INJECTION, POWDER, LYOPHILIZED, FOR SOLUTION INTRAVENOUS at 15:57

## 2020-05-09 RX ADMIN — Medication 10 ML: at 21:39

## 2020-05-09 RX ADMIN — IOPAMIDOL 75 ML: 755 INJECTION, SOLUTION INTRAVENOUS at 13:07

## 2020-05-09 RX ADMIN — CEFEPIME 2 G: 2 INJECTION, POWDER, FOR SOLUTION INTRAVENOUS at 15:27

## 2020-05-09 RX ADMIN — IOPAMIDOL 85 ML: 755 INJECTION, SOLUTION INTRAVENOUS at 13:08

## 2020-05-09 RX ADMIN — KETOROLAC TROMETHAMINE 15 MG: 30 INJECTION, SOLUTION INTRAMUSCULAR at 15:27

## 2020-05-09 RX ADMIN — SODIUM CHLORIDE: 9 INJECTION, SOLUTION INTRAVENOUS at 21:39

## 2020-05-09 RX ADMIN — METOCLOPRAMIDE 10 MG: 5 INJECTION, SOLUTION INTRAMUSCULAR; INTRAVENOUS at 15:27

## 2020-05-09 RX ADMIN — SODIUM CHLORIDE 1000 ML: 9 INJECTION, SOLUTION INTRAVENOUS at 13:12

## 2020-05-09 RX ADMIN — SODIUM CHLORIDE 500 ML: 9 INJECTION, SOLUTION INTRAVENOUS at 15:28

## 2020-05-09 RX ADMIN — DIPHENHYDRAMINE HYDROCHLORIDE 12.5 MG: 50 INJECTION, SOLUTION INTRAMUSCULAR; INTRAVENOUS at 15:27

## 2020-05-09 RX ADMIN — ACYCLOVIR SODIUM 645 MG: 500 INJECTION, SOLUTION INTRAVENOUS at 15:11

## 2020-05-09 ASSESSMENT — ENCOUNTER SYMPTOMS
RHINORRHEA: 0
DIARRHEA: 1
BACK PAIN: 0
ABDOMINAL DISTENTION: 0
CHEST TIGHTNESS: 0
SHORTNESS OF BREATH: 0
COUGH: 0
VOMITING: 1
NAUSEA: 1

## 2020-05-09 ASSESSMENT — PAIN DESCRIPTION - PAIN TYPE: TYPE: ACUTE PAIN

## 2020-05-09 ASSESSMENT — PAIN SCALES - GENERAL
PAINLEVEL_OUTOF10: 6
PAINLEVEL_OUTOF10: 6
PAINLEVEL_OUTOF10: 0

## 2020-05-09 ASSESSMENT — PAIN DESCRIPTION - LOCATION: LOCATION: CHEST

## 2020-05-09 NOTE — ED NOTES
Galo MCDANIEL notified that patient can move right lower extremity, no new orders at this time. Patient off unit for MRI.      Maritza Boyle RN  05/09/20 [id number]

## 2020-05-09 NOTE — ED NOTES
Consult to [facility] at [phone] via [address] [facility] [facility]  05/09/20 8781    Consult completed with call back from Dr. Metzger Cancer at [facility]  05/09/20 4742

## 2020-05-09 NOTE — ED PROVIDER NOTES
Review of Systems    Positives and Pertinent negatives as per HPI. Except as noted abovein the ROS, all other systems were reviewed and negative.        PAST MEDICAL HISTORY     Past Medical History:   Diagnosis Date    Depression     Irritable     Nephrolith 5/2010    Sleep apnea     uses c-pap    Thyroid disease     hypothyroid - chronic lymphocytic thyroiditis         SURGICAL HISTORY     Past Surgical History:   Procedure Laterality Date    HERNIA REPAIR  4/5/16    ventral    AZ LAP, IGNACIO RESTRICT PROC, LONGITUDINAL GASTRECTOMY N/A 10/8/2018    LAPAROSCOPIC SLEEVE GASTRECTOMY-ETHICON performed by Sabra Seth MD at [address], PARTIAL  3/15    UPPER GASTROINTESTINAL ENDOSCOPY  05/11/2018    VASECTOMY N/A 2012    [address]       Discharge Medication List as of 5/9/2020  7:32 PM      CONTINUE these medications which have NOT CHANGED    Details   buPROPion (WELLBUTRIN SR) 100 MG extended release tablet Take 2 tablets in the morning and 2 tablets at night, Disp-120 tablet, R-3D/c the 150 doseNormal      busPIRone (BUSPAR) 10 MG tablet TAKE 1 TABLET BY MOUTH THREE TIMES A DAY AS NEEDED FOR ANXIETY, Disp-90 tablet, R-5Normal      levothyroxine (SYNTHROID) 137 MCG tablet Take 1 tablet by mouth daily, Disp-90 tablet, R-0Normal      Multiple Vitamins-Minerals (BARIATRIC FUSION) CHEW Take 4 tablets by mouth dailyHistorical Med               ALLERGIES     Other    FAMILYHISTORY       Family History   Problem Relation Age of Onset    Depression Mother     Arthritis Father         RA    Cancer Sister         thyroid    Depression Sister     Cancer Paternal Grandfather         lung    Cancer Paternal Aunt         lung          SOCIAL HISTORY       Social History     Socioeconomic History    Marital status:      Spouse name: None    Number of children: None    Years of education: None    Highest education level: None   Occupational mood and affect. DIAGNOSTIC RESULTS   LABS:    Labs Reviewed   COMPREHENSIVE METABOLIC PANEL W/ REFLEX TO MG FOR LOW K - Abnormal; Notable for the following components:       Result Value    CREATININE 0.8 (*)     All other components within normal limits    Narrative:     Performed at:  [place] [address],  [place], [facility]   Phone [phone]   LACTIC ACID, PLASMA - Abnormal; Notable for the following components:    Lactic Acid 2.8 (*)     All other components within normal limits    Narrative:     Performed at:  [facility]) - [place] [address],  [place], [facility]   Phone [phone]   CULTURE, BLOOD 1   CULTURE, BLOOD 2   CBC WITH AUTO DIFFERENTIAL    Narrative:     Performed at:  [place] [address],  [place], [facility]   Phone [phone]   TROPONIN    Narrative:     Performed at:  [place] [address],  [place], [facility]   Phone [phone]   BRAIN NATRIURETIC PEPTIDE    Narrative:     Performed at:  [place] [address],  [place], [facility]   Phone [phone]   TROPONIN    Narrative:     Performed at:  [place] [address],  [place], [facility]   Phone [phone]   URINE DRUG SCREEN    Narrative:     Performed at:  [place] [address],  [place], [facility]   Phone [phone]       All other labs were within normal range or not returned as of this dictation. EKG: All EKG's are interpreted by the Emergency Department Physician who either signs orCo-signs this chart in the absence of a cardiologist.  Please see their note for interpretation of EKG.       RADIOLOGY:   Non-plain film images such as CT, Ultrasound and MRI are read by the radiologist. Plain radiographic images are visualized recognition program.  Efforts were made to edit the dictations but occasionally words are mis-transcribed.)    JONAS Mcdaniel (electronically signed)        JONAS Mcdaniel  05/09/20 2112       Rachel Mcdaniel  05/09/20 5757

## 2020-05-09 NOTE — ED NOTES
[facility] reviewed with [facility] ems upon discharged. Patient stable in stretcher.       Erin Alan RN  05/09/20 5833

## 2020-05-09 NOTE — ED PROVIDER NOTES
Emergency Department Provider Note  Location: [place] [address] ED  5/9/2020     Patient Identification  Portia Winn is a 35 y.o. male    Chief Complaint  Altered Mental Status (Was having sex with wife and went unresponsive c/o headaches for 1 week and chest pain)          HPI  (History provided by patient and EMS)  Patient is a 26-year-old male who presents after episode of coming acutely unresponsive. Per EMS he was having intercourse with his wife and suddenly became unresponsive. On EMS arrival awakes to sternal rub only. Normal vital signs protecting his airway. He arrives and is drowsy but speaking in sentences protecting his airway, reassuring vital signs. He complains of mild headache, and chest discomfort. States he has had mild headache for the past 5 or so days\". He reports chest discomfort as well unsure how long but states he feels it now. He is drowsy and is a difficult historian. No history of seizures. He also reports inability to move his lower extremities. Has not had any of the symptoms prior to sudden onset proximally 1 hour ago. I have reviewed the following nursing documentation:  Allergies: Allergies   Allergen Reactions    Other Other (See Comments)     Hazelnuts, throat closes       Past medical history:  has a past medical history of Depression, Irritable, Nephrolith (5/2010), Sleep apnea, and Thyroid disease. Past surgical history:  has a past surgical history that includes [facility] tooth extraction; Thyroidectomy, partial (3/15); hernia repair (4/5/16); Vasectomy (N/A, 2012); Upper gastrointestinal endoscopy (05/11/2018); and pr lap, kodi restrict proc, longitudinal gastrectomy (N/A, 10/8/2018). Home medications:   Prior to Admission medications    Medication Sig Start Date End Date Taking?  Authorizing Provider   buPROPion [facility] SR) 200 MG extended release tablet Take 1 tablet by mouth 2 times daily 5/11/20   Joseline Rangel DO Neg Negative <300 ng/mL    Oxycodone Urine Neg Negative <100 ng/ml    pH, UA 5.0     Drug Screen Comment: see below    EKG 12 Lead   Result Value Ref Range    Ventricular Rate 75 BPM    Atrial Rate 75 BPM    P-R Interval 152 ms    QRS Duration 90 ms    Q-T Interval 374 ms    QTc Calculation (Bazett) 417 ms    P Axis 52 degrees    R Axis 46 degrees    T Axis 34 degrees    Diagnosis       Normal sinus rhythmNormal ECGConfirmed by Kosta Abraham MD ([id number]) on 5/9/2020 12:48:09 PM   EKG 12 Lead   Result Value Ref Range    Ventricular Rate 75 BPM    Atrial Rate 75 BPM    P-R Interval 154 ms    QRS Duration 92 ms    Q-T Interval 376 ms    QTc Calculation (Bazett) 419 ms    P Axis 62 degrees    R Axis 59 degrees    T Axis 48 degrees    Diagnosis       Normal sinus rhythmNormal ECGConfirmed by Kosta Abraham MD ([id number]) on 5/9/2020 12:48:02 PM         MDM  79-year-old male with no significant medical history who arrives after becoming acutely unresponsive while having intercourse. He arrives drowsy but is responsive to verbal stimuli at this point. This appears to be improved from his state when EMS originally saw him. Normal fingerstick glucose in route. He has no clinical toxidrome of opioid overdose. Reassuring vital signs. He has a unusual concerning neuro exam.  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 He has intact sensation. When I specifically about fever he states that he has had low-grade fever for the past few days. Given his chest pain and new neuro deficit, as well as sudden onset syncope with in the setting of headache, need to consider ruptured subarachnoid, dissection vertebral arteries cerebral artery and the entire aorta. He was sent for CTA head and neck as well as chest abdomen and pelvis which is negative for any dissection or any other vascular injury. He has no high-grade occlusion.     Reassessed the patient multiple times and he is now more alert

## 2020-05-10 LAB
ANION GAP SERPL CALCULATED.3IONS-SCNC: 12 MMOL/L (ref 3–16)
BASOPHILS ABSOLUTE: 0 K/UL (ref 0–0.2)
BASOPHILS RELATIVE PERCENT: 0.6 %
BUN BLDV-MCNC: 14 MG/DL (ref 7–20)
CALCIUM SERPL-MCNC: 8.9 MG/DL (ref 8.3–10.6)
CHLORIDE BLD-SCNC: 102 MMOL/L (ref 99–110)
CO2: 26 MMOL/L (ref 21–32)
CREAT SERPL-MCNC: 0.9 MG/DL (ref 0.9–1.3)
EOSINOPHILS ABSOLUTE: 0.2 K/UL (ref 0–0.6)
EOSINOPHILS RELATIVE PERCENT: 2.6 %
GFR AFRICAN AMERICAN: >60
GFR NON-AFRICAN AMERICAN: >60
GLUCOSE BLD-MCNC: 95 MG/DL (ref 70–99)
HCT VFR BLD CALC: 42.6 % (ref 40.5–52.5)
HEMOGLOBIN: 14.2 G/DL (ref 13.5–17.5)
LACTIC ACID: 1.5 MMOL/L (ref 0.4–2)
LYMPHOCYTES ABSOLUTE: 2.3 K/UL (ref 1–5.1)
LYMPHOCYTES RELATIVE PERCENT: 36.7 %
MCH RBC QN AUTO: 29.7 PG (ref 26–34)
MCHC RBC AUTO-ENTMCNC: 33.3 G/DL (ref 31–36)
MCV RBC AUTO: 89 FL (ref 80–100)
MONOCYTES ABSOLUTE: 0.6 K/UL (ref 0–1.3)
MONOCYTES RELATIVE PERCENT: 9.3 %
NEUTROPHILS ABSOLUTE: 3.2 K/UL (ref 1.7–7.7)
NEUTROPHILS RELATIVE PERCENT: 50.8 %
PDW BLD-RTO: 13.9 % (ref 12.4–15.4)
PLATELET # BLD: 197 K/UL (ref 135–450)
PMV BLD AUTO: 7.4 FL (ref 5–10.5)
POTASSIUM REFLEX MAGNESIUM: 3.9 MMOL/L (ref 3.5–5.1)
RBC # BLD: 4.79 M/UL (ref 4.2–5.9)
SODIUM BLD-SCNC: 140 MMOL/L (ref 136–145)
T4 FREE: 1 NG/DL (ref 0.9–1.8)
TSH SERPL DL<=0.05 MIU/L-ACNC: 34.46 UIU/ML (ref 0.27–4.2)
WBC # BLD: 6.3 K/UL (ref 4–11)

## 2020-05-10 PROCEDURE — 93005 ELECTROCARDIOGRAM TRACING: CPT | Performed by: STUDENT IN AN ORGANIZED HEALTH CARE EDUCATION/TRAINING PROGRAM

## 2020-05-10 PROCEDURE — 6370000000 HC RX 637 (ALT 250 FOR IP): Performed by: STUDENT IN AN ORGANIZED HEALTH CARE EDUCATION/TRAINING PROGRAM

## 2020-05-10 PROCEDURE — 84439 ASSAY OF FREE THYROXINE: CPT

## 2020-05-10 PROCEDURE — 2580000003 HC RX 258: Performed by: STUDENT IN AN ORGANIZED HEALTH CARE EDUCATION/TRAINING PROGRAM

## 2020-05-10 PROCEDURE — 80048 BASIC METABOLIC PNL TOTAL CA: CPT

## 2020-05-10 PROCEDURE — 6360000002 HC RX W HCPCS: Performed by: STUDENT IN AN ORGANIZED HEALTH CARE EDUCATION/TRAINING PROGRAM

## 2020-05-10 PROCEDURE — 85025 COMPLETE CBC W/AUTO DIFF WBC: CPT

## 2020-05-10 PROCEDURE — 83605 ASSAY OF LACTIC ACID: CPT

## 2020-05-10 PROCEDURE — 2060000000 HC ICU INTERMEDIATE R&B

## 2020-05-10 RX ORDER — BUPROPION HYDROCHLORIDE 100 MG/1
200 TABLET, EXTENDED RELEASE ORAL 2 TIMES DAILY
Status: DISCONTINUED | OUTPATIENT
Start: 2020-05-10 | End: 2020-05-11 | Stop reason: HOSPADM

## 2020-05-10 RX ORDER — DIPHENHYDRAMINE HCL 25 MG
50 TABLET ORAL NIGHTLY PRN
COMMUNITY
End: 2021-01-14

## 2020-05-10 RX ADMIN — ACETAMINOPHEN 650 MG: 325 TABLET ORAL at 08:58

## 2020-05-10 RX ADMIN — ENOXAPARIN SODIUM 40 MG: 40 INJECTION SUBCUTANEOUS at 08:54

## 2020-05-10 RX ADMIN — LEVOTHYROXINE SODIUM 137 MCG: 137 TABLET ORAL at 08:53

## 2020-05-10 RX ADMIN — ACETAMINOPHEN 650 MG: 325 TABLET ORAL at 23:41

## 2020-05-10 RX ADMIN — BUPROPION HYDROCHLORIDE 200 MG: 100 TABLET, FILM COATED, EXTENDED RELEASE ORAL at 19:52

## 2020-05-10 RX ADMIN — SODIUM CHLORIDE: 9 INJECTION, SOLUTION INTRAVENOUS at 08:54

## 2020-05-10 RX ADMIN — BUPROPION HYDROCHLORIDE 300 MG: 150 TABLET, EXTENDED RELEASE ORAL at 08:54

## 2020-05-10 RX ADMIN — Medication 10 ML: at 08:54

## 2020-05-10 RX ADMIN — THERA TABS 1 TABLET: TAB at 08:54

## 2020-05-10 RX ADMIN — ACETAMINOPHEN 650 MG: 325 TABLET ORAL at 14:43

## 2020-05-10 RX ADMIN — Medication 10 ML: at 19:52

## 2020-05-10 ASSESSMENT — PAIN DESCRIPTION - LOCATION
LOCATION: HEAD
LOCATION: HEAD

## 2020-05-10 ASSESSMENT — PAIN DESCRIPTION - PROGRESSION
CLINICAL_PROGRESSION: NOT CHANGED
CLINICAL_PROGRESSION: NOT CHANGED

## 2020-05-10 ASSESSMENT — PAIN DESCRIPTION - ONSET
ONSET: ON-GOING
ONSET: ON-GOING

## 2020-05-10 ASSESSMENT — PAIN DESCRIPTION - DESCRIPTORS
DESCRIPTORS: HEADACHE;THROBBING
DESCRIPTORS: HEADACHE;THROBBING

## 2020-05-10 ASSESSMENT — PAIN SCALES - GENERAL
PAINLEVEL_OUTOF10: 4
PAINLEVEL_OUTOF10: 3
PAINLEVEL_OUTOF10: 5
PAINLEVEL_OUTOF10: 3

## 2020-05-10 ASSESSMENT — PAIN DESCRIPTION - PAIN TYPE
TYPE: ACUTE PAIN
TYPE: ACUTE PAIN

## 2020-05-10 NOTE — PROGRESS NOTES
05/09/20  2151 05/10/20  0450    141 140   K 4.7 4.4 3.9    102 102   CO2 23 26 26   BUN 17 14 14   CREATININE 0.8* 0.8* 0.9   GLUCOSE 98 100* 95   CALCIUM 9.0 9.3 8.9   ANIONGAP 14 13 12     Hepatic:   Recent Labs     05/09/20  1154   AST 18   ALT 16   BILITOT 0.5   PROT 7.2   LABALBU 4.2   ALKPHOS 49     Troponin:   Recent Labs     05/09/20  1154 05/09/20  1533 05/09/20  2202   TROPONINI <0.01 <0.01 <0.01     BNP: No results for input(s): BNP in the last 72 hours. Lipids: No results for input(s): CHOL, HDL in the last 72 hours. Invalid input(s): LDLCALCU, TRIGLYCERIDE  ABGs:  No results for input(s): PHART, VEM3NFR, PO2ART, KCU3TLT, BEART, THGBART, T2PHFCXT, WAI1ZWT in the last 72 hours. INR: No results for input(s): INR in the last 72 hours. Lactate: No results for input(s): LACTATE in the last 72 hours. Cultures:  -----------------------------------------------------------------  RAD:   No orders to display       Assessment/Plan:   Syncope suspect 2/2 Seizure and/or less likely Cardiogenic   - event occurred after sexual intercourse and there was noted spastic paralysis of BL LE after waking.   - Neurology consulted  - Lactate mildly elevated but normalized with fluid.  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 and/or Complex Migraine and/or Spinal Process  - did admit to fever, did receive a dose of vancomycin, cefepime, and acyclovir  - LP attempted but unsuccessful, will defer to Neuro for LP  - Neurology consulted, appreciate recs     Nausea, Vomiting possibly 2/2 Viral Gastroenteritis  - IVF as above    COVID-19 ruleout, low suspicion   - pt had recent symptoms but had tested negative on 5/7 however, per policy d/t pt being transferred from another facility, COVID-19 R/O sent    Cholelithiasis noted on CT  - LFT WNL  - f/u OP, or if N/V fail to improve    Hypothyroidism  - cont home levothyroxine    Depression/Anxiety  - cont home Bupropion 200mg BID and Buspar PRN    VANDANA  - no longer on CPAP  - nightime pulse ox, O2 as PRN    Morbid Obesity: Body mass index is 35.85 kg/m². Rolanda Cobian Complicating assessment and treatment. Placing patient at risk for multiple co-morbidities as well as early death and contributing to the patient's presentation.  Counseled on diet, weight loss    Code Status: FULL CODE  FEN: General DIet  PPX: SQH  DISPO: BERTIN Damon, PGY-1  05/10/20  10:50 AM    This patient will be staffed and discussed with Dereje Valladares MD.

## 2020-05-10 NOTE — PLAN OF CARE
Problem: Respiratory:  Goal: Ability to maintain adequate ventilation will improve  Description: Ability to maintain adequate ventilation will improve  Outcome: Ongoing  Patient lung sounds clear. Patient breathing is unlabored and normal. No respiratory complaints or concerns from patient. Problem: Mental Status - Impaired:  Goal: Mental status will improve  Description: Mental status will improve  Outcome: Ongoing  Problem: Physical Regulation:  Goal: Ability to maintain a stable neurologic state will improve  Description: Ability to maintain a stable neurologic state will improve  Outcome: Ongoing  Seizure precautions in place. Patient remains free of any seizure activity. Patient currently states he is at his baseline except for having a mild/moderate headache. Neuro exam unremarkable. Problem: Falls - Risk of:  Goal: Will remain free from falls  Description: Will remain free from falls  Outcome: Ongoing  Pt. Is a medium falls risk r/t secondary diagnosis. VSS. Falls sign on door. Pt. Bryan Jose understanding of precautions. Pt. Directed to call RN w/ any needs. Pt. Using call light as directed. Will continue to monitor.

## 2020-05-10 NOTE — PROGRESS NOTES
COVID nasal swab completed at 2206 and walked to lab.     Electronically signed by Maria A White RN on 5/9/2020 at 10:11 PM

## 2020-05-10 NOTE — CONSULTS
reportedly moving all extremities well. Per nursing, upon arrival to [facility], patient was reportedly back to his baseline. Per primary attending, the patient has been having headaches since last Wednesday. He was taking over the counter medication for headaches which did provide relief. Patient reportedly had a headache prior to his episode of unresponsiveness. Headache described as throbbing in nature with associated photophobia. He has known history of Migraines. Initially in the ED at OSH, patient did report that he has had a headache since Wednesday and it has been getting progressively worse. He described it as one of the most severe headaches of his life, similar to the migraines he experienced in college. Per RN, patient had 4/10 headache this morning for which he received tylenol. Headache improved but did not fully resolve. Of note, patient had a similar episode when he was 7years old. He was taken to a hospital, was admitted for 24 to 36-hour. Was unable to walk. Resolved after a day or two and he was told that it for probably some viral cause.        Medical History:  Past Medical History:   Diagnosis Date    Depression     Irritable     Nephrolith 5/2010    Sleep apnea     uses c-pap    Thyroid disease     hypothyroid - chronic lymphocytic thyroiditis     Past Surgical History:   Procedure Laterality Date    HERNIA REPAIR  4/5/16    ventral    IL LAP, IGNACIO RESTRICT PROC, LONGITUDINAL GASTRECTOMY N/A 10/8/2018    LAPAROSCOPIC SLEEVE GASTRECTOMY-ETHICON performed by Michelle Clayton MD at [address] [place] [address], PARTIAL  3/15    UPPER GASTROINTESTINAL ENDOSCOPY  05/11/2018    VASECTOMY N/A 2012    WISDOM TOOTH EXTRACTION       Medications Prior to Admission: Acetaminophen--5 MG/5ML SUSP, Take by mouth nightly as needed  diphenhydrAMINE (BENADRYL) 25 MG tablet, Take 50 mg by mouth nightly as needed for Sleep   buPROPion (WELLBUTRIN SR) 100 MG extended release tablet, Take 2 tablets in the morning and 2 tablets at night  busPIRone (BUSPAR) 10 MG tablet, TAKE 1 TABLET BY MOUTH THREE TIMES A DAY AS NEEDED FOR ANXIETY  levothyroxine (SYNTHROID) 137 MCG tablet, Take 1 tablet by mouth daily  Multiple Vitamins-Minerals (BARIATRIC FUSION) CHEW, Take 4 tablets by mouth daily  Allergies   Allergen Reactions    Other Other (See Comments)     Hazelnuts, throat closes     Family History   Problem Relation Age of Onset    Depression Mother     Arthritis Father         RA    Cancer Sister         thyroid    Depression Sister     Cancer Paternal Grandfather         lung    Cancer Paternal Aunt         lung     Social History     Tobacco Use   Smoking Status Former Smoker    Packs/day: 0.50    Years: 17.00    Pack years: 8.50    Types: Cigarettes    Last attempt to quit: 2018    Years since quittin.3   Smokeless Tobacco Never Used     Social History     Substance and Sexual Activity   Drug Use No     Social History     Substance and Sexual Activity   Alcohol Use Yes    Comment: 1MTH       ROS:  Unable to assess given COVID r/o isolation       Unable to examine given currently in COVID r/o isolation and attempts to preserve PPE  Blood pressure (!) 138/92, pulse 86, temperature 98.5 °F (36.9 °C), temperature source Oral, resp. rate 16, height 6' 3\" (1.905 m), weight 286 lb 13.1 oz (130.1 kg), SpO2 95 %. Per RN,  The patient is alert, fully oriented, following commands well, and is without any speech difficulties. He is able to track right and left. He is moving all extremities fully and movements appear equal.     Labs  Lactate: 2.8--> 1.5  BUN: 14  Creatinine: 0.9  CK: 116  LFTs ok  Glucose: 95  WBC: 6.3  TSH: 34.46    Urine tox screen negative     Blood culture x 2: In process    COVID-19 in process    Studies  CT head  No acute intracranial abnormality    CTA head and neck  No acute abnormality or flow-limiting stenosis of the major arteries of the   head and neck.      MRI brain w/wo tre  No acute intracranial abnormality.  No mass effect or abnormal intracranial   enhancement.         MRI C spine w/wo tre  No evidence of discitis osteomyelitis or epidural abscess.       Mild degenerate changes C5-6 on the right. MRI T spine w/wo tre  No evidence of discitis osteomyelitis or epidural abscess.       No significant degenerate change. MRI L spine w/wo tre  No evidence of discitis osteomyelitis or epidural abscess.       Mild foraminal narrowing L4-5.       Mild facet arthropathy identified mid to lower lumbar spine.       Mild increased signal identified involving the interspinous ligaments L3-4,   L4-5 and  L5-S1 could be due to strain injury.  Clinical correlation   recommended     EKG: Sinus rhythm    Impression:  1. Transient alteration in awareness  2. Transient paraplegia   3. Headache  4. History of migraines      Richelle Whitney. is a 35 y.o. male who presented with transient alteration in awareness and transient paraplegia in the setting of headache with rapid clinical improvement back to his baseline. Unclear etiology. Neuroimaging unrevealing and reassuring. His clinical improvement is also very reassuring. Favor possible vascular event given abrupt onset, but unable to localize for all symptoms. Do wonder about possible vasospasm event given headache. Seizure on the differential as well, however, paraplegia atypical presentation of Jostin's paralysis. Recommendations:  - Unlikely to require LP given rapid clinical improvement back to his baseline.  Will discuss this further with attending Neurologist on afternoon rounds  - Routine EEG tomorrow reasonable   - Will discuss further recommendations with attending neurologist on afternoon rounds      A copy of this note was provided for MD Fei Mata [address] Neurology  [phone]

## 2020-05-10 NOTE — PROGRESS NOTES
Patient admitted to [phone] from [facility] ED. Patient placed on telemetry, stable vitals obtained. Patient oriented to room and call light. Patient refused skin assessment at this time. Call light and bedside table within reach. All questions and needs met prior to leaving room. Will continue to monitor.

## 2020-05-11 VITALS
BODY MASS INDEX: 35.31 KG/M2 | HEART RATE: 85 BPM | DIASTOLIC BLOOD PRESSURE: 90 MMHG | WEIGHT: 283.95 LBS | SYSTOLIC BLOOD PRESSURE: 139 MMHG | HEIGHT: 75 IN | RESPIRATION RATE: 20 BRPM | TEMPERATURE: 97.5 F | OXYGEN SATURATION: 96 %

## 2020-05-11 LAB
ANION GAP SERPL CALCULATED.3IONS-SCNC: 9 MMOL/L (ref 3–16)
ANION GAP SERPL CALCULATED.3IONS-SCNC: 9 MMOL/L (ref 3–16)
BASOPHILS ABSOLUTE: 0.1 K/UL (ref 0–0.2)
BASOPHILS RELATIVE PERCENT: 1.1 %
BUN BLDV-MCNC: 13 MG/DL (ref 7–20)
BUN BLDV-MCNC: 9 MG/DL (ref 7–20)
CALCIUM SERPL-MCNC: 8.5 MG/DL (ref 8.3–10.6)
CALCIUM SERPL-MCNC: 9.2 MG/DL (ref 8.3–10.6)
CHLORIDE BLD-SCNC: 103 MMOL/L (ref 99–110)
CHLORIDE BLD-SCNC: 105 MMOL/L (ref 99–110)
CO2: 23 MMOL/L (ref 21–32)
CO2: 25 MMOL/L (ref 21–32)
CREAT SERPL-MCNC: 0.8 MG/DL (ref 0.9–1.3)
CREAT SERPL-MCNC: 0.9 MG/DL (ref 0.9–1.3)
EKG ATRIAL RATE: 83 BPM
EKG DIAGNOSIS: NORMAL
EKG P AXIS: 59 DEGREES
EKG P-R INTERVAL: 162 MS
EKG Q-T INTERVAL: 382 MS
EKG QRS DURATION: 94 MS
EKG QTC CALCULATION (BAZETT): 448 MS
EKG R AXIS: 33 DEGREES
EKG T AXIS: 37 DEGREES
EKG VENTRICULAR RATE: 83 BPM
EOSINOPHILS ABSOLUTE: 0.2 K/UL (ref 0–0.6)
EOSINOPHILS RELATIVE PERCENT: 2.7 %
GFR AFRICAN AMERICAN: >60
GFR AFRICAN AMERICAN: >60
GFR NON-AFRICAN AMERICAN: >60
GFR NON-AFRICAN AMERICAN: >60
GLUCOSE BLD-MCNC: 77 MG/DL (ref 70–99)
GLUCOSE BLD-MCNC: 96 MG/DL (ref 70–99)
HCT VFR BLD CALC: 45.2 % (ref 40.5–52.5)
HEMOGLOBIN: 15.1 G/DL (ref 13.5–17.5)
LYMPHOCYTES ABSOLUTE: 2.5 K/UL (ref 1–5.1)
LYMPHOCYTES RELATIVE PERCENT: 41.7 %
MCH RBC QN AUTO: 30.2 PG (ref 26–34)
MCHC RBC AUTO-ENTMCNC: 33.3 G/DL (ref 31–36)
MCV RBC AUTO: 90.6 FL (ref 80–100)
MONOCYTES ABSOLUTE: 0.5 K/UL (ref 0–1.3)
MONOCYTES RELATIVE PERCENT: 9 %
NEUTROPHILS ABSOLUTE: 2.7 K/UL (ref 1.7–7.7)
NEUTROPHILS RELATIVE PERCENT: 45.5 %
PDW BLD-RTO: 13.8 % (ref 12.4–15.4)
PLATELET # BLD: ABNORMAL K/UL (ref 135–450)
PLATELET SLIDE REVIEW: ABNORMAL
PMV BLD AUTO: ABNORMAL FL (ref 5–10.5)
POTASSIUM REFLEX MAGNESIUM: 3.6 MMOL/L (ref 3.5–5.1)
POTASSIUM REFLEX MAGNESIUM: 4.1 MMOL/L (ref 3.5–5.1)
RBC # BLD: 4.98 M/UL (ref 4.2–5.9)
SODIUM BLD-SCNC: 137 MMOL/L (ref 136–145)
SODIUM BLD-SCNC: 137 MMOL/L (ref 136–145)
WBC # BLD: 6 K/UL (ref 4–11)

## 2020-05-11 PROCEDURE — 2580000003 HC RX 258: Performed by: STUDENT IN AN ORGANIZED HEALTH CARE EDUCATION/TRAINING PROGRAM

## 2020-05-11 PROCEDURE — 85025 COMPLETE CBC W/AUTO DIFF WBC: CPT

## 2020-05-11 PROCEDURE — 93010 ELECTROCARDIOGRAM REPORT: CPT | Performed by: INTERNAL MEDICINE

## 2020-05-11 PROCEDURE — 6360000002 HC RX W HCPCS: Performed by: STUDENT IN AN ORGANIZED HEALTH CARE EDUCATION/TRAINING PROGRAM

## 2020-05-11 PROCEDURE — 6370000000 HC RX 637 (ALT 250 FOR IP): Performed by: STUDENT IN AN ORGANIZED HEALTH CARE EDUCATION/TRAINING PROGRAM

## 2020-05-11 PROCEDURE — 80048 BASIC METABOLIC PNL TOTAL CA: CPT

## 2020-05-11 RX ORDER — BUPROPION HYDROCHLORIDE 200 MG/1
200 TABLET, EXTENDED RELEASE ORAL 2 TIMES DAILY
Qty: 60 TABLET | Refills: 3 | Status: SHIPPED | OUTPATIENT
Start: 2020-05-11 | End: 2020-10-01 | Stop reason: SDUPTHER

## 2020-05-11 RX ADMIN — BUPROPION HYDROCHLORIDE 200 MG: 100 TABLET, FILM COATED, EXTENDED RELEASE ORAL at 08:44

## 2020-05-11 RX ADMIN — Medication 10 ML: at 08:44

## 2020-05-11 RX ADMIN — LEVOTHYROXINE SODIUM 137 MCG: 137 TABLET ORAL at 08:44

## 2020-05-11 RX ADMIN — ENOXAPARIN SODIUM 40 MG: 40 INJECTION SUBCUTANEOUS at 08:44

## 2020-05-11 RX ADMIN — THERA TABS 1 TABLET: TAB at 08:44

## 2020-05-11 RX ADMIN — ACETAMINOPHEN 650 MG: 325 TABLET ORAL at 08:43

## 2020-05-11 ASSESSMENT — PAIN DESCRIPTION - ORIENTATION: ORIENTATION: MID;OUTER

## 2020-05-11 ASSESSMENT — PAIN SCALES - GENERAL
PAINLEVEL_OUTOF10: 2
PAINLEVEL_OUTOF10: 0
PAINLEVEL_OUTOF10: 2
PAINLEVEL_OUTOF10: 4
PAINLEVEL_OUTOF10: 2

## 2020-05-11 ASSESSMENT — PAIN DESCRIPTION - PROGRESSION
CLINICAL_PROGRESSION: GRADUALLY IMPROVING
CLINICAL_PROGRESSION: NOT CHANGED
CLINICAL_PROGRESSION: GRADUALLY IMPROVING
CLINICAL_PROGRESSION: NOT CHANGED
CLINICAL_PROGRESSION: GRADUALLY IMPROVING
CLINICAL_PROGRESSION: GRADUALLY IMPROVING
CLINICAL_PROGRESSION: NOT CHANGED
CLINICAL_PROGRESSION: GRADUALLY WORSENING
CLINICAL_PROGRESSION: GRADUALLY IMPROVING
CLINICAL_PROGRESSION: NOT CHANGED
CLINICAL_PROGRESSION: GRADUALLY IMPROVING

## 2020-05-11 ASSESSMENT — PAIN - FUNCTIONAL ASSESSMENT: PAIN_FUNCTIONAL_ASSESSMENT: ACTIVITIES ARE NOT PREVENTED

## 2020-05-11 ASSESSMENT — PAIN DESCRIPTION - DESCRIPTORS: DESCRIPTORS: ACHING;HEADACHE

## 2020-05-11 ASSESSMENT — PAIN DESCRIPTION - PAIN TYPE: TYPE: ACUTE PAIN

## 2020-05-11 ASSESSMENT — PAIN DESCRIPTION - FREQUENCY: FREQUENCY: CONTINUOUS

## 2020-05-11 ASSESSMENT — PAIN DESCRIPTION - ONSET: ONSET: ON-GOING

## 2020-05-11 ASSESSMENT — PAIN DESCRIPTION - LOCATION: LOCATION: HEAD

## 2020-05-11 NOTE — RESULT ENCOUNTER NOTE
Please contact patient with their testing results: Your test for COVID-19, also known as novel coronavirus, came back negative. No virus was detected from the sample from your nose. Until your symptoms are fully resolved, you may still be contagious. We recommend that you remain isolated for 7 days minimum or 72 hours after your symptoms have completely resolved, whichever is longer. For example, if all symptoms improve after 2 days, you should still remain isolated for 7 days. If your symptoms get better after 10 days, you should remain isolated for 13 days. Continually monitor symptoms. Contact a medical provider if symptoms are worsening. If you have any additional questions, contact your doctor.     For additional information, please visit the Centers for Disease Control and Prevention ([employer]ia[employer].com.cy

## 2020-05-11 NOTE — CARE COORDINATION
must be in pharmacy by 3pm to be filled same day  3. Copy of patient's insurance/ prescription drug card and patient face sheet must be sent along with the prescription(s)  4. Cost of Rx cannot be added to hospital bill. If financial assistance is needed, please contact unit  or ;  or  CANNOT provide pharmacy voucher for patients co-pays  5.  Patients can then  the prescription on their way out of the hospital at discharge, or pharmacy can deliver to the bedside if staff is available. (payment due at time of pick-up or delivery - cash, check, or card accepted)     Able to afford home medications/ co-pay costs: Yes    ADLS  Support Systems: Spouse/Significant Other, Children    PT AM-PAC:   /24  OT AM-PAC:   /24    [place]: from home with spouse  Steps:     Plans to RETURN to current housing: Yes  Barriers to RETURNING to current housing: none    Home Care Information  Currently ACTIVE with [address]: No  Home Care Agency: Not Applicable    Currently ACTIVE with [place] [facility]: No  Passport/ Waiver: No  Passport/ Waiver Services: Not Applicable    :  Phone:       [id number]  DME Provider: none  Equipment:     Home Oxygen and [address] prior to admission: No  [address]: Not Applicable  Other Respiratory Equipment:     Informed of need to bring portable home O2 tank on day of DISCHARGE for nursing to connect prior to leaving: No  Verbalized agreement/Understanding: No  Person to bring portable tank at discharge:     Dialysis  Active with HD/PD prior to admission: No  Nephrologist:     HD Center:  Not Applicable    DISCHARGE PLAN:  Disposition: Home- No Services Needed    Transportation PLAN for discharge: family     Factors facilitating achievement of predicted outcomes: Family support, Cooperative and Pleasant    Barriers to discharge: Medical complications and Medication

## 2020-05-11 NOTE — DISCHARGE SUMMARY
deficit present. Mental Status: He is alert and oriented to person, place, and time. Cranial Nerves: No cranial nerve deficit. Sensory: No sensory deficit. Motor: No weakness. Gait: Gait normal.   Psychiatric:         Mood and Affect: Mood normal.         Behavior: Behavior normal.         Thought Content: Thought content normal.         Judgment: Judgment normal.         Labs: For convenience and continuity at follow-up the following most recent labs are provided:      CBC:    Lab Results   Component Value Date    WBC 6.0 05/11/2020    HGB 15.1 05/11/2020    HCT 45.2 05/11/2020    PLT COM 05/11/2020       Renal:    Lab Results   Component Value Date     05/11/2020    K 4.1 05/11/2020     05/11/2020    CO2 25 05/11/2020    BUN 13 05/11/2020    CREATININE 0.9 05/11/2020    CALCIUM 9.2 05/11/2020         Significant Diagnostic Studies    Radiology:   No orders to display          Consults:     IP CONSULT TO CASE MANAGEMENT  IP CONSULT TO PHARMACY  IP CONSULT TO NEUROLOGY      Discharge Instructions/Follow-up:     Follow up with Neurology in 2 weeks to discuss mgmt of migraines. Given episode of LOC, patient should be advised that he should avoid any behaviors that were to put himself or others at risk if he were to have another episode: Driving, attending to small children who cannot call for help, avoiding ladders/heights, tub bathing, etc; For 3 months and until cleared by a physician.     Activity: activity as tolerated    Diet: regular diet    Discharge Medications:     Current Discharge Medication List           Details   buPROPion (WELLBUTRIN SR) 200 MG extended release tablet Take 1 tablet by mouth 2 times daily  Qty: 60 tablet, Refills: 3              Details   Acetaminophen--5 MG/5ML SUSP Take by mouth nightly as needed      diphenhydrAMINE (BENADRYL) 25 MG tablet Take 50 mg by mouth nightly as needed for Sleep       busPIRone (BUSPAR) 10 MG tablet TAKE 1 TABLET BY

## 2020-05-11 NOTE — PROGRESS NOTES
Progress Note    Admit Date: 5/9/2020  Day: 2  Diet: DIET GENERAL;    CC: LOC and BLE Weakness    Interval history: Pt seen and examined this morning. Resting comfortably with no new complaints. COVID negative. Syncope workup pending. Will need ECHo and spot EEG today. Neurology leaning toward complex migraine over Jostin's paralysis. Denies any fevers, chills, chest pain, palpitations, leg swelling, cough, shortness of breath, difficulty breathing, wheezing, abdominal pain, nausea, vomiting, diarrhea. Medications:     Scheduled Meds:   buPROPion  200 mg Oral BID    sodium chloride flush  10 mL Intravenous 2 times per day    enoxaparin  40 mg Subcutaneous Daily    levothyroxine  137 mcg Oral Daily    multivitamin   Oral Daily     Continuous Infusions:    PRN Meds:perflutren lipid microspheres, sodium chloride flush, acetaminophen **OR** acetaminophen, polyethylene glycol, promethazine **OR** ondansetron    Objective:   Vitals:   T-max:  Patient Vitals for the past 8 hrs:   BP Temp Temp src Pulse Resp SpO2 Weight   05/11/20 0823 112/79 97.9 °F (36.6 °C) Oral 85 18 94 % --   05/11/20 0557 107/71 98 °F (36.7 °C) Oral 86 18 95 % 283 lb 15.2 oz (128.8 kg)       Intake/Output Summary (Last 24 hours) at 5/11/2020 0944  Last data filed at 5/11/2020 0935  Gross per 24 hour   Intake 1100 ml   Output 1955 ml   Net -855 ml     PHYSICAL EXAM  Physical Exam   Constitutional: He is oriented to person, place, and time. He appears obese. HENT:   Head: Normocephalic and atraumatic. Eyes: Pupils are equal, round, and reactive to light. Conjunctivae and EOM are normal.   Neck: Normal range of motion. Neck supple. No JVD present. Cardiovascular: Normal rate, regular rhythm, normal heart sounds and intact distal pulses. Pulmonary/Chest: Effort normal and breath sounds normal.   Abdominal: Soft. Bowel sounds are normal. He exhibits no distension. There is no tenderness. Musculoskeletal: Normal range of motion. acyclovir  - LP attempted but unsuccessful, will defer to Neuro for LP  - Neurology consulted, appreciate recs     Nausea, Vomiting possibly 2/2 Viral Gastroenteritis (RESOLVED)  - IVF completed, symptoms resolved    COVID-19 Negative  - pt had recent symptoms but had tested negative on 5/7 however, per policy d/t pt being transferred from another facility, COVID-19 R/O sent    Cholelithiasis noted on CT  - LFT WNL  - f/u OP, or if N/V fail to improve    Hypothyroidism  - cont home levothyroxine    Depression/Anxiety  - cont home Bupropion 200mg BID and Buspar PRN    VANDANA  - no longer on CPAP  - nightime pulse ox, O2 as PRN    Morbid Obesity: Body mass index is 35.49 kg/m². Sameera Mulliken Complicating assessment and treatment. Placing patient at risk for multiple co-morbidities as well as early death and contributing to the patient's presentation.  Counseled on diet, weight loss    Code Status: FULL CODE  FEN: General DIet  PPX: SQH  DISPO: BERTIN Cartagena, PGY-1  05/11/20  9:44 AM    This patient will be staffed and discussed with Temitope Francis MD.

## 2020-05-12 ENCOUNTER — TELEPHONE (OUTPATIENT)
Dept: FAMILY MEDICINE CLINIC | Age: 40
End: 2020-05-12

## 2020-05-12 LAB
REPORT: NORMAL
SARS-COV-2: NOT DETECTED
THIS TEST SENT TO: NORMAL

## 2020-05-12 RX ORDER — LEVOTHYROXINE SODIUM 0.15 MG/1
150 TABLET ORAL DAILY
Status: CANCELLED | OUTPATIENT
Start: 2020-05-12

## 2020-05-12 RX ORDER — LEVOTHYROXINE SODIUM 0.15 MG/1
150 TABLET ORAL DAILY
Qty: 30 TABLET | Refills: 2 | Status: SHIPPED | OUTPATIENT
Start: 2020-05-12 | End: 2020-08-18 | Stop reason: SDUPTHER

## 2020-05-12 NOTE — TELEPHONE ENCOUNTER
[facility] Transitions Initial Follow Up Call    Outreach made within 2 business days of discharge: Yes    Patient: Diana Oliveira. Patient : 1980   MRN: [id number]  Reason for Admission: There are no discharge diagnoses documented for the most recent discharge. Discharge Date: 20       Spoke with: Patient spoke with office earlier today and has an appointment scheduled for later this week.     Scheduled appointment with PCP within 7-14 days    Follow Up  Future Appointments   Date Time Provider Estefani Crump   2020  1:00 PM KUSUM Aguirre - CNP [facility] [place], [place]

## 2020-05-13 LAB
BLOOD CULTURE, ROUTINE: NORMAL
CULTURE, BLOOD 2: NORMAL

## 2020-05-14 ENCOUNTER — VIRTUAL VISIT (OUTPATIENT)
Dept: FAMILY MEDICINE CLINIC | Age: 40
End: 2020-05-14
Payer: COMMERCIAL

## 2020-05-14 PROCEDURE — 99496 TRANSJ CARE MGMT HIGH F2F 7D: CPT | Performed by: NURSE PRACTITIONER

## 2020-05-14 ASSESSMENT — ENCOUNTER SYMPTOMS
COUGH: 0
WHEEZING: 0
SHORTNESS OF BREATH: 0

## 2020-05-14 NOTE — PROGRESS NOTES
2020    TELEHEALTH EVALUATION -- Audio/Visual (During TDKRQ-66 public health emergency)    HPI:    Olayinka Thompson. (:  1980) has requested an audio/video evaluation for the following concern(s):    Admitted: 2020  Discharged:2020    Headaches-  Has been having migraine since last Wednesday- woke him up from his sleep- tried ibuprofen and tylenol. 2020 Was having intercourse with wife and went unresponsive- lost function of legs. When he came to at the hospital- legs felt heavy and he could not move them. Feeling came back in legs as the day went on. Patient states he was told they think it could have been a Hemiplegic migraine- will see neuro for diagnosis and treatment    Was given Dr. Josette Gant name for follow up on migraines but then was told migraines were not his speciality and was supposed to follow up with a different provider  He called neurology Tu and Yesterday morning- and has not heard back from neuro. Has been taking sinus severe meds at home- it helps the headache too a little. No other sinus symptoms at this time. All labs and imaging were normal at the hospital           Review of Systems   Constitutional: Negative for chills and fever. HENT: Negative for congestion and ear pain. Respiratory: Negative for cough, shortness of breath and wheezing. Cardiovascular: Negative for chest pain and palpitations. Neurological: Positive for headaches (frontal headache ). Prior to Visit Medications    Medication Sig Taking?  Authorizing Provider   levothyroxine (SYNTHROID) 150 MCG tablet Take 1 tablet by mouth daily Yes KUSUM Arellano - CNP   buPROPion (WELLBUTRIN SR) 200 MG extended release tablet Take 1 tablet by mouth 2 times daily Yes Momo Flores DO   Acetaminophen--5 MG/5ML SUSP Take by mouth nightly as needed Yes Historical Provider, MD   diphenhydrAMINE (BENADRYL) 25 MG tablet Take 50 mg by mouth nightly as needed for Sleep apparent distress      [] Abnormal-   Mental status  [x] Alert and awake  [x] Oriented to person/place/time [x]Able to follow commands      Eyes:  EOM    []  Normal  [] Abnormal-  Sclera  [x]  Normal  [] Abnormal -         Discharge [x]  None visible  [] Abnormal -    HENT:   [x] Normocephalic, atraumatic. [] Abnormal   [x] Mouth/Throat: Mucous membranes are moist.     External Ears [] Normal  [] Abnormal-     Neck: [x] No visualized mass     Pulmonary/Chest: [x] Respiratory effort normal.  [] No visualized signs of difficulty breathing or respiratory distress        [] Abnormal-      Musculoskeletal:   [] Normal gait with no signs of ataxia         [x] Normal range of motion of neck        [] Abnormal-       Neurological:        [] No Facial Asymmetry (Cranial nerve 7 motor function) (limited exam to video visit)          [x] No gaze palsy        [] Abnormal-         Skin:        [x] No significant exanthematous lesions or discoloration noted on facial skin         [] Abnormal-            Psychiatric:       [x] Normal Affect [x] No Hallucinations        [] Abnormal-     Other pertinent observable physical exam findings-     ASSESSMENT/PLAN:  1. Hypothyroidism, unspecified type    - TSH without Reflex; Future  - T4, Free; Future    2. Hemiplegic migraine without status migrainosus, not intractable  Patient given number for Dr. Dayana Ramirez- if has trouble scheduling we will try to call their office    - Mary Arita MD, Neurology, [facility]      No follow-ups on file. Stacey Patel. is a 35 y.o. male being evaluated by a Virtual Visit (video visit) encounter to address concerns as mentioned above. A caregiver was present when appropriate. Due to this being a TeleHealth encounter (During QBXUZ-37 public health emergency), evaluation of the following organ systems was limited: Vitals/Constitutional/EENT/Resp/CV/GI//MS/Neuro/Skin/Heme-Lymph-Imm.   Pursuant to the emergency declaration under the [facility]

## 2020-07-02 ENCOUNTER — VIRTUAL VISIT (OUTPATIENT)
Dept: FAMILY MEDICINE CLINIC | Age: 40
End: 2020-07-02
Payer: COMMERCIAL

## 2020-07-02 PROCEDURE — G8427 DOCREV CUR MEDS BY ELIG CLIN: HCPCS | Performed by: NURSE PRACTITIONER

## 2020-07-02 PROCEDURE — 99213 OFFICE O/P EST LOW 20 MIN: CPT | Performed by: NURSE PRACTITIONER

## 2020-07-02 ASSESSMENT — ENCOUNTER SYMPTOMS
WHEEZING: 0
SHORTNESS OF BREATH: 0

## 2020-07-02 NOTE — PROGRESS NOTES
2020    TELEHEALTH EVALUATION -- Audio/Visual (During OICCA-03 public health emergency)    HPI:    Marlon Triana (:  1980) has requested an audio/video evaluation for the following concern(s):    Concentration deficit:  Was on Ritalin when he was 11-6 yo. In college he was on adderall again. 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 Cardiovascular: Negative for chest pain. Prior to Visit Medications    Medication Sig Taking? Authorizing Provider   zolpidem (AMBIEN) 5 MG tablet Take 1 tablet by mouth nightly as needed for Sleep for up to 30 days.  Yes KUSUM Minor CNP   levothyroxine (SYNTHROID) 150 MCG tablet Take 1 tablet by mouth daily Yes KUSUM Minor CNP   buPROPion (WELLBUTRIN SR) 200 MG extended release tablet Take 1 tablet by mouth 2 times daily Yes Jd Nelson,    busPIRone (BUSPAR) 10 MG tablet TAKE 1 TABLET BY MOUTH THREE TIMES A DAY AS NEEDED FOR ANXIETY Yes KUSUM Minor CNP   Multiple Vitamins-Minerals (BARIATRIC FUSION) CHEW Take 4 tablets by mouth daily Yes Historical Provider, MD   Acetaminophen--5 MG/5ML SUSP Take by mouth nightly as needed  Historical Provider, MD   diphenhydrAMINE (BENADRYL) 25 MG tablet Take 50 mg by mouth nightly as needed for Sleep   Historical Provider, MD       Social History     Tobacco Use    Smoking status: Former Smoker     Packs/day: 0.50     Years: 17.00     Pack years: 8.50     Types: Cigarettes     Last attempt to quit: 2018     Years since quittin.4    Smokeless tobacco: Never Used   Substance Use Topics  Alcohol use: Yes     Comment: 1MTH    Drug use: No        Allergies   Allergen Reactions    Other Other (See Comments)     Hazelnuts, throat closes   ,   Past Medical History:   Diagnosis Date    Depression     Hemiplegic migraine 05/2020    Irritable     Nephrolith 5/2010    Sleep apnea     uses c-pap    Thyroid disease     hypothyroid - chronic lymphocytic thyroiditis   ,   Past Surgical History:   Procedure Laterality Date    HERNIA REPAIR  4/5/16    ventral    MD LAP, IGNACIO RESTRICT PROC, LONGITUDINAL GASTRECTOMY N/A 10/8/2018    LAPAROSCOPIC SLEEVE GASTRECTOMY-ETHICON performed by Alberto Solis MD at [address] [place] [address], PARTIAL  3/15    UPPER GASTROINTESTINAL ENDOSCOPY  05/11/2018    VASECTOMY N/A 2012    WISDOM TOOTH EXTRACTION     ,   Family History   Problem Relation Age of Onset    Depression Mother     Arthritis Father         RA    Cancer Sister         thyroid    Depression Sister     Cancer Paternal Grandfather         lung    Cancer Paternal Aunt         lung       PHYSICAL EXAMINATION:  [ INSTRUCTIONS:  \"[x]\" Indicates a positive item  \"[]\" Indicates a negative item  -- DELETE ALL ITEMS NOT EXAMINED]  Vital Signs: (As obtained by patient/caregiver or practitioner observation)    Blood pressure-  Heart rate-    Respiratory rate-    Temperature-  Pulse oximetry-     Constitutional: [x] Appears well-developed and well-nourished [x] No apparent distress      [] Abnormal-   Mental status  [x] Alert and awake  [x] Oriented to person/place/time [x]Able to follow commands      Eyes:  EOM    []  Normal  [] Abnormal-  Sclera  [x]  Normal  [] Abnormal -         Discharge [x]  None visible  [] Abnormal -    HENT:   [x] Normocephalic, atraumatic.   [] Abnormal   [x] Mouth/Throat: Mucous membranes are moist.     External Ears [x] Normal  [] Abnormal-     Neck: [x] No visualized mass     Pulmonary/Chest: [x] Respiratory effort normal.  [x] No visualized signs of difficulty breathing or respiratory distress        [] Abnormal-      Musculoskeletal:   [] Normal gait with no signs of ataxia         [x] Normal range of motion of neck        [] Abnormal-       Neurological:        [x] No Facial Asymmetry (Cranial nerve 7 motor function) (limited exam to video visit)          [x] No gaze palsy        [] Abnormal-         Skin:        [x] No significant exanthematous lesions or discoloration noted on facial skin         [] Abnormal-            Psychiatric:       [x] Normal Affect [x] No Hallucinations        [] Abnormal-     Other pertinent observable physical exam findings-     ASSESSMENT/PLAN:  1. Concentration deficit  Make appt with Dr. Luli Hernandez    2. Insomnia, unspecified type  ambien PRN    3. Depression with anxiety  Continue current meds    4. Hypothyroidism, unspecified type  Get labs done soon  Continue current dose for now        No follow-ups on file. Jonathan King is a 38 y.o. male being evaluated by a Virtual Visit (video visit) encounter to address concerns as mentioned above. A caregiver was present when appropriate. Due to this being a TeleHealth encounter (During AEZXE-39 public health emergency), evaluation of the following organ systems was limited: Vitals/Constitutional/EENT/Resp/CV/GI//MS/Neuro/Skin/Heme-Lymph-Imm. Pursuant to the emergency declaration under the [address] [place] [address] authority and the [employer] and [employer] Act, this Virtual Visit was conducted with patient's (and/or legal guardian's) consent, to reduce the patient's risk of exposure to COVID-19 and provide necessary medical care. The patient (and/or legal guardian) has also been advised to contact this office for worsening conditions or problems, and seek emergency medical treatment and/or call 911 if deemed necessary.      Patient identification was verified at the start of the visit: Yes    Total time spent on this encounter: Not billed by time    Services were provided through a video synchronous discussion virtually to substitute for in-person clinic visit. Patient and provider were located at their individual homes. --KUSUM Huerta CNP on 7/2/2020 at 10:50 AM    An electronic signature was used to authenticate this note.

## 2020-07-13 RX ORDER — ZOLPIDEM TARTRATE 5 MG/1
5 TABLET ORAL NIGHTLY PRN
Qty: 30 TABLET | Refills: 0 | Status: SHIPPED | OUTPATIENT
Start: 2020-07-13 | End: 2020-08-21

## 2020-07-22 ENCOUNTER — VIRTUAL VISIT (OUTPATIENT)
Dept: PSYCHOLOGY | Age: 40
End: 2020-07-22
Payer: COMMERCIAL

## 2020-07-22 PROCEDURE — 90791 PSYCH DIAGNOSTIC EVALUATION: CPT | Performed by: PSYCHOLOGIST

## 2020-07-22 NOTE — PROGRESS NOTES
Behavioral Health Consultation  Anne Marie Wilson, Ph.D.  Psychologist  8/4/2020  4:06 PM EDT      Time spent with Patient: 30 minutes  This is patient's first [facility] appointment. Reason for Consult:    Chief Complaint   Patient presents with    ADHD     Referring Provider: Tripp Vazquez, APRN - CNP  [address] [facility] [address], [address] [facility]    Pt provided informed consent for the behavioral health program. Discussed with patient model of service to include the limits of confidentiality (i.e. abuse reporting, suicide intervention, etc.) and short-term intervention focused approach. Pt indicated understanding. Feedback given to PCP. TELEHEALTH VISIT -- Audio/Visual (During ZWZDF-88 public health emergency)  }  Pursuant to the emergency declaration under the [address] [place] [address], [facility] waiver authority and the [employer] Act, this Virtual Visit was conducted, with patient's consent, to reduce the patient's risk of exposure to COVID-19 and provide continuity of care for an established patient. Services were provided through a video synchronous discussion virtually to substitute for in-person clinic visit. Pt gave verbal informed consent to participate in telehealth services. Conducted a risk-benefit analysis and determined that the patient's presenting problems are consistent with the use of telepsychology. Determined that the patient has sufficient knowledge and skills in the use of technology enabling them to adequately benefit from telepsychology. It was determined that this patient was able to be properly treated without an in-person session. Patient verified that they were currently located at the [address] [place] [address] Dr address that was provided during registration.     Verified the following information:  Patient's identification: Yes  Patient location: [address] [place] [address]  Patient's call back number: [phone]   Patient's emergency illegal drug use. Patient rides his bike 2-3 days a week. Patient describes difficulty initiating sleep. Takes Ambien or benadryl on a nightly basis. Usually sleeps without medication on weekends. Patient describes good social support. Family history is positive for depression (mother and sister). Patient was treated at [place] [address] when he was younger. He has been seen a few times by a counselor at [facility], primarily for marriage counseling.     O:  MSE:    Appearance: good hygiene   Attitude: cooperative and friendly  Consciousness: alert  Orientation: oriented to person, place, time, general circumstance  Memory: recent and remote memory intact  Attention/Concentration: intact during session  Psychomotor Activity:normal  Eye Contact: normal  Speech: normal rate and volume, well-articulated  Mood: \"irritable\"  Affect: euthymic  Perception: within normal limits  Thought Content: intrusive thoughts  Thought Process: logical, coherent and goal-directed  Insight: good  Judgment: intact  Ability to understand instructions: Yes  Ability to respond meaningfully: Yes  Morbid Ideation: no   Suicide Assessment: no suicidal ideation, plan, or intent  Homicidal Ideation: no    History:    Medications:   Current Outpatient Medications   Medication Sig Dispense Refill    zolpidem (AMBIEN) 5 MG tablet TAKE 1 TABLET BY MOUTH NIGHTLY AS NEEDED FOR  SLEEP  FOR  UP  TO  30  DAYS 30 tablet 0    levothyroxine (SYNTHROID) 150 MCG tablet Take 1 tablet by mouth daily 30 tablet 2    buPROPion (WELLBUTRIN SR) 200 MG extended release tablet Take 1 tablet by mouth 2 times daily 60 tablet 3    Acetaminophen--5 MG/5ML SUSP Take by mouth nightly as needed      diphenhydrAMINE (BENADRYL) 25 MG tablet Take 50 mg by mouth nightly as needed for Sleep       busPIRone (BUSPAR) 10 MG tablet TAKE 1 TABLET BY MOUTH THREE TIMES A DAY AS NEEDED FOR ANXIETY 90 tablet 5    Multiple Vitamins-Minerals (BARIATRIC FUSION) CHEW Take 4 tablets by mouth daily       No current facility-administered medications for this visit. Social History:   Social History     Socioeconomic History    Marital status:      Spouse name: Not on file    Number of children: Not on file    Years of education: Not on file    Highest education level: Not on file   Occupational History    Not on file   Social Needs    Financial resource strain: Not on file    Food insecurity     Worry: Not on file     Inability: Not on file    Transportation needs     Medical: Not on file     Non-medical: Not on file   Tobacco Use    Smoking status: Former Smoker     Packs/day: 0.50     Years: 17.00     Pack years: 8.50     Types: Cigarettes     Last attempt to quit: 2018     Years since quittin.5    Smokeless tobacco: Never Used   Substance and Sexual Activity    Alcohol use: Yes     Comment: 1MTH    Drug use: No    Sexual activity: Yes     Partners: Female     Comment: Indira = wife   Lifestyle    Physical activity     Days per week: Not on file     Minutes per session: Not on file    Stress: Not on file   Relationships    Social connections     Talks on phone: Not on file     Gets together: Not on file     Attends Holiness service: Not on file     Active member of club or organization: Not on file     Attends meetings of clubs or organizations: Not on file     Relationship status: Not on file    Intimate partner violence     Fear of current or ex partner: Not on file     Emotionally abused: Not on file     Physically abused: Not on file     Forced sexual activity: Not on file   Other Topics Concern    Not on file   Social History Narrative    Not on file     TOBACCO:   reports that he quit smoking about 2 years ago. His smoking use included cigarettes. He has a 8.50 pack-year smoking history. He has never used smokeless tobacco.  ETOH:   reports current alcohol use.   Family History:   Family History   Problem Relation Age of Onset    Depression Mother    Saad Whitaker Arthritis Father         RA    Cancer Sister         thyroid    Depression Sister     Cancer Paternal Grandfather         lung    Cancer Paternal Aunt         lung     A:  Patient engaged and cooperative. Denies SI. Insight and motivation are good. Diagnosis:    1. Recurrent major depressive disorder, in partial remission ([facility] [address]          Diagnosis Date    Depression     Hemiplegic migraine 05/2020    Irritable     Nephrolith 5/2010    Sleep apnea     uses c-pap    Thyroid disease     hypothyroid - chronic lymphocytic thyroiditis     Plan:  Pt interventions:  Established rapport, Conducted functional assessment, [place]-setting to identify pt's primary goals for [facility] visit / overall health, Supportive techniques, Emphasized self-care as important for managing overall health and Collaboratively set goals with pt re: treatment.     Pt Behavioral Change Plan:   See Pt Instructions

## 2020-08-05 ENCOUNTER — VIRTUAL VISIT (OUTPATIENT)
Dept: PSYCHOLOGY | Age: 40
End: 2020-08-05
Payer: COMMERCIAL

## 2020-08-05 ENCOUNTER — TELEPHONE (OUTPATIENT)
Dept: PSYCHOLOGY | Age: 40
End: 2020-08-05

## 2020-08-05 PROCEDURE — 98968 PH1 ASSMT&MGMT NQHP 21-30: CPT | Performed by: PSYCHOLOGIST

## 2020-08-05 NOTE — PROGRESS NOTES
Behavioral Health Consultation  Dari Ross, Ph.D.  Psychologist  8/5/2020  4:06 PM EDT      Time spent with Patient: 30 minutes  This is patient's second [facility] appointment. Reason for Consult:    Chief Complaint   Patient presents with    ADHD     Referring Provider: Kareem Zavala, KUSUM - CNP  [address] [facility] [address], [address]    Feedback given to PCP. TELEHEALTH VISIT -- Audio/Visual (During SLPXD-13 public health emergency)  }  Pursuant to the emergency declaration under the [facility] waiver authority and the [employer] and [employer] Act, this Virtual Visit was conducted, with patient's consent, to reduce the patient's risk of exposure to COVID-19 and provide continuity of care for an established patient. Services were provided through a video synchronous discussion virtually to substitute for in-person clinic visit. Pt gave verbal informed consent to participate in telehealth services. Conducted a risk-benefit analysis and determined that the patient's presenting problems are consistent with the use of telepsychology. Determined that the patient has sufficient knowledge and skills in the use of technology enabling them to adequately benefit from telepsychology. It was determined that this patient was able to be properly treated without an in-person session. Patient verified that they were currently located at the [facility] address that was provided during registration.     Verified the following information:  Patient's identification: Yes  Patient location: [address] [place] [id number]   Patient's call back number: [phone]   Patient's emergency contact's name and number, as well as permission to contact them if needed: Extended Emergency Contact Information  Primary Emergency Contact: Lily Bedolla  Address: [facility] [address] Phone: [phone]  Relation: Spouse     Provider location: [address]:  Patient reported that there have been no changes since last visit. Discussed school history. Patient reported that, since his father was in the [employer], he moved often. Attended 9 schools in 11 years. He reported that this created the most difficulty for him in terms of socializing. He stopped trying to make friends, since he was usually unable to maintain relationships. With regard to academics, patient struggled to focus in classes that he was uninterested in. His favorite subject was history. He focused better on lectures/class presentations than on written material. He continues to have difficulty learning through reading. He notes that he learned best with one-on-one interaction. He was disciplined occasionally for talking in class, was in a few fights at school, did not complete in-class work. He often resisted homework (threw it away, did not complete it at home). Often ran out of time on exams, usually due to poor focus. Reported graduating from high school with Cs and Ds. Patient and his sister completed chores at home. He did not have trouble maintaining this routine. Patient enrolled at [employer] after high school. He dropped out after 1 semester due to lack of interest. A few years ago, patient enrolled at [employer] to study nursing. He completed 3 semesters. Stated that he was interested in the coursework, liked the program, made As and Bs. However, he started a new job that interfered with his schedule. 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 Denies any difficulty with performance or attention. Since starting his new job, he feels the demands on his attention are much higher and \"it scares me. \"     Patient acknowledges a history of difficulty managing his finances. Did not budget well, often was unable to pay his bills.  He eventually developed more structure for his finances, but still often forgot to pay

## 2020-08-17 DIAGNOSIS — E03.9 HYPOTHYROIDISM, UNSPECIFIED TYPE: ICD-10-CM

## 2020-08-18 LAB
T4 FREE: 1.1 NG/DL (ref 0.9–1.8)
TSH SERPL DL<=0.05 MIU/L-ACNC: 28.06 UIU/ML (ref 0.27–4.2)

## 2020-08-18 RX ORDER — LEVOTHYROXINE SODIUM 0.2 MG/1
200 TABLET ORAL DAILY
Qty: 30 TABLET | Refills: 2 | Status: SHIPPED | OUTPATIENT
Start: 2020-08-18 | End: 2020-11-25 | Stop reason: SDUPTHER

## 2020-08-21 RX ORDER — ZOLPIDEM TARTRATE 5 MG/1
5 TABLET ORAL NIGHTLY PRN
Qty: 30 TABLET | Refills: 0 | Status: SHIPPED | OUTPATIENT
Start: 2020-08-21 | End: 2020-09-23

## 2020-09-21 ENCOUNTER — OFFICE VISIT (OUTPATIENT)
Dept: FAMILY MEDICINE CLINIC | Age: 40
End: 2020-09-21
Payer: COMMERCIAL

## 2020-09-21 VITALS
DIASTOLIC BLOOD PRESSURE: 80 MMHG | TEMPERATURE: 97.7 F | SYSTOLIC BLOOD PRESSURE: 110 MMHG | HEART RATE: 83 BPM | WEIGHT: 296 LBS | OXYGEN SATURATION: 96 % | BODY MASS INDEX: 37 KG/M2

## 2020-09-21 PROCEDURE — 1036F TOBACCO NON-USER: CPT | Performed by: NURSE PRACTITIONER

## 2020-09-21 PROCEDURE — G8417 CALC BMI ABV UP PARAM F/U: HCPCS | Performed by: NURSE PRACTITIONER

## 2020-09-21 PROCEDURE — 99213 OFFICE O/P EST LOW 20 MIN: CPT | Performed by: NURSE PRACTITIONER

## 2020-09-21 PROCEDURE — G8427 DOCREV CUR MEDS BY ELIG CLIN: HCPCS | Performed by: NURSE PRACTITIONER

## 2020-09-21 ASSESSMENT — ENCOUNTER SYMPTOMS
SHORTNESS OF BREATH: 0
WHEEZING: 0

## 2020-09-21 NOTE — PATIENT INSTRUCTIONS
Patient Education        Plantar Fasciitis: Exercises  Introduction  Here are some examples of exercises for you to try. The exercises may be suggested for a condition or for rehabilitation. Start each exercise slowly. Ease off the exercises if you start to have pain. You will be told when to start these exercises and which ones will work best for you. How to do the exercises  Towel stretch   1. Sit with your legs extended and knees straight. 2. Place a towel around your foot just under the toes. 3. Hold each end of the towel in each hand, with your hands above your knees. 4. Pull back with the towel so that your foot stretches toward you. 5. Hold the position for at least 15 to 30 seconds. 6. Repeat 2 to 4 times a session, up to 5 sessions a day. Calf stretch   This exercise stretches the muscles at the back of the lower leg (the calf) and the Achilles tendon. Do this exercise 3 or 4 times a day, 5 days a week. 1. Stand facing a wall with your hands on the wall at about eye level. Put the leg you want to stretch about a step behind your other leg. 2. Keeping your back heel on the floor, bend your front knee until you feel a stretch in the back leg. 3. Hold the stretch for 15 to 30 seconds. Repeat 2 to 4 times. Plantar fascia and calf stretch   Stretching the plantar fascia and calf muscles can increase flexibility and decrease heel pain. You can do this exercise several times each day and before and after activity. 1. Stand on a step as shown above. Be sure to hold on to the banister. 2. Slowly let your heels down over the edge of the step as you relax your calf muscles. You should feel a gentle stretch across the bottom of your foot and up the back of your leg to your knee. 3. Hold the stretch about 15 to 30 seconds, and then tighten your calf muscle a little to bring your heel back up to the level of the step. Repeat 2 to 4 times.     Towel curls   Make this exercise more challenging by placing a weighted object, such as a soup can, on the other end of the towel. 1. While sitting, place your foot on a towel on the floor and scrunch the towel toward you with your toes. 2. Then, also using your toes, push the towel away from you. [place] pickups   1. Put marbles on the floor next to a cup.  2. Using your toes, try to lift the marbles up from the floor and put them in the cup. Follow-up care is a key part of your treatment and safety. Be sure to make and go to all appointments, and call your doctor if you are having problems. It's also a good idea to know your test results and keep a list of the medicines you take. Where can you learn more? Go to https://Dynasil.Bixti.com. org and sign in to your Pocketbook account. Enter J167 in the A2B box to learn more about \"Plantar Fasciitis: Exercises. \"     If you do not have an account, please click on the \"Sign Up Now\" link. Current as of: March 2, 2020               Content Version: 12.5  © 8315-8711 [employer]. Care instructions adapted under license by [facility] ([facility]). If you have questions about a medical condition or this instruction, always ask your healthcare professional. [address] any warranty or liability for your use of this information. Patient Education        Plantar Fasciitis: Care Instructions  Your Care Instructions     Plantar fasciitis is pain and inflammation of the plantar fascia, the tissue at the bottom of your foot that connects the heel bone to the toes. The plantar fascia also supports the arch. If you strain the plantar fascia, it can develop small tears and cause heel pain when you stand or walk. Plantar fasciitis can be caused by running or other sports. It also may occur in people who are overweight or who have high arches or flat feet.  You may get plantar fasciitis if you walk or stand for long periods, or have a tight Achilles tendon or calf muscles. You can improve your foot pain with rest and other care at home. It might take a few weeks to a few months for your foot to heal completely. Follow-up care is a key part of your treatment and safety. Be sure to make and go to all appointments, and call your doctor if you are having problems. It's also a good idea to know your test results and keep a list of the medicines you take. How can you care for yourself at home? · Rest your feet often. Reduce your activity to a level that lets you avoid pain. If possible, do not run or walk on hard surfaces. · Take pain medicines exactly as directed. ? If the doctor gave you a prescription medicine for pain, take it as prescribed. ? If you are not taking a prescription pain medicine, take an over-the-counter anti-inflammatory medicine for pain and swelling, such as ibuprofen (Advil, Motrin) or naproxen (Aleve). Read and follow all instructions on the label. · Use ice massage to help with pain and swelling. You can use an ice cube or an ice cup several times a day. To make an ice cup, fill a paper cup with water and freeze it. Cut off the top of the cup until a half-inch of ice shows. Hold onto the remaining paper to use the cup. Rub the ice in small circles over the area for 5 to 7 minutes. · Contrast baths, which alternate hot and cold water, can also help reduce swelling. But because heat alone may make pain and swelling worse, end a contrast bath with a soak in cold water. · Wear a night splint if your doctor suggests it. A night splint holds your foot with the toes pointed up and the foot and ankle at a 90-degree angle. This position gives the bottom of your foot a constant, gentle stretch. · Do simple exercises such as calf stretches and towel stretches 2 to 3 times each day, especially when you first get up in the morning. These can help the plantar fascia become more flexible. They also make the muscles that support your arch stronger.  Hold these stretches for 15 to 30 seconds per stretch. Repeat 2 to 4 times. ? Stand about 1 foot from a wall. Place the palms of both hands against the wall at chest level. Lean forward against the wall, keeping one leg with the knee straight and heel on the ground while bending the knee of the other leg.  ? Sit down on the floor or a mat with your feet stretched in front of you. Roll up a towel lengthwise, and loop it over the ball of your foot. Holding the towel at both ends, gently pull the towel toward you to stretch your foot. · Wear shoes with good arch support. Athletic shoes or shoes with a well-cushioned sole are good choices. · Try heel cups or shoe inserts (orthotics) to help cushion your heel. You can buy these at many shoe stores. · Put on your shoes as soon as you get out of bed. Going barefoot or wearing slippers may make your pain worse. · Reach and stay at a good weight for your height. This puts less strain on your feet. When should you call for help? Call your doctor now or seek immediate medical care if:  · You have heel pain with fever, redness, or warmth in your heel. · You cannot put weight on the sore foot. Watch closely for changes in your health, and be sure to contact your doctor if:  · You have numbness or tingling in your heel. · Your heel pain lasts more than 2 weeks. Where can you learn more? Go to https://[employer].Lights[employer]ed [employer]. org and sign in to your [employer] account. Enter V538 in the [facility] box to learn more about \"Plantar Fasciitis: Care Instructions. \"     If you do not have an account, please click on the \"Sign Up Now\" link. Current as of: March 2, 2020               Content Version: 12.5  © 1321-6302 [employer]. Care instructions adapted under license by [facility] ([facility]).  If you have questions about a medical condition or this instruction, always ask your healthcare professional. Missionshantelle Skinner disclaims any warranty or liability for your use of this information.

## 2020-09-21 NOTE — PROGRESS NOTES
Patient: Smitha Acharya is a 38 y.o. male who presents today with the following Chief Complaint(s):  Chief Complaint   Patient presents with    Foot Pain     knots in the arches of his feet         HPI   Here with c/o bilateral foot pain- feels like knots in arches of both feet lump in the arch of both feet- not painful all the time, gets a sharp shooting pain by the end of a work day. Current Outpatient Medications   Medication Sig Dispense Refill    buPROPion (WELLBUTRIN SR) 200 MG extended release tablet Take 1 tablet by mouth 2 times daily 60 tablet 3    busPIRone (BUSPAR) 10 MG tablet TAKE 1 TABLET BY MOUTH THREE TIMES A DAY AS NEEDED FOR ANXIETY 90 tablet 5    levothyroxine (SYNTHROID) 200 MCG tablet Take 1 tablet by mouth daily 30 tablet 2    Acetaminophen--5 MG/5ML SUSP Take by mouth nightly as needed      diphenhydrAMINE (BENADRYL) 25 MG tablet Take 50 mg by mouth nightly as needed for Sleep       Multiple Vitamins-Minerals (BARIATRIC FUSION) CHEW Take 4 tablets by mouth daily       No current facility-administered medications for this visit. Patient's past medical history, surgical history, family history, medications,  andallergies  were all reviewed and updated as appropriate today. Review of Systems   Constitutional: Negative for chills and fever. Respiratory: Negative for shortness of breath and wheezing. Cardiovascular: Negative for chest pain and palpitations. Musculoskeletal:        Bilateral foot pain in arch- Knots In arches         Physical Exam  Vitals signs and nursing note reviewed. Constitutional:       Appearance: Normal appearance. He is well-developed. HENT:      Head: Normocephalic and atraumatic. Right Ear: External ear normal.      Left Ear: External ear normal.      Nose: Nose normal.      Mouth/Throat:      Pharynx: No oropharyngeal exudate or posterior oropharyngeal erythema.    Eyes:      Conjunctiva/sclera: Conjunctivae normal. Neck:      Musculoskeletal: Normal range of motion. Cardiovascular:      Rate and Rhythm: Normal rate and regular rhythm. Heart sounds: Normal heart sounds. No murmur. Pulmonary:      Effort: Pulmonary effort is normal. No respiratory distress. Breath sounds: Normal breath sounds. No wheezing or rales. Musculoskeletal: Normal range of motion. Comments: Knots in both arches of feet- possible plantar fascitis    Lymphadenopathy:      Cervical: No cervical adenopathy. Skin:     General: Skin is warm and dry. Neurological:      General: No focal deficit present. Mental Status: He is alert and oriented to person, place, and time. Deep Tendon Reflexes: Reflexes are normal and symmetric. Psychiatric:         Mood and Affect: Mood normal.         Behavior: Behavior normal.         Thought Content: Thought content normal.         Judgment: Judgment normal.       Vitals:    09/21/20 1617   BP: 110/80   Pulse: 83   Temp: 97.7 °F (36.5 °C)   SpO2: 96%       Assessment:  Encounter Diagnosis   Name Primary?  Bilateral foot pain Yes       Plan:  1. Bilateral foot pain  Discussed tylenol PRN  Rolling feet on frozen water bottles- stretches given   Possible plantar fascitis  - Amb External Referral To Podiatry        No follow-ups on file.

## 2020-09-23 RX ORDER — ZOLPIDEM TARTRATE 5 MG/1
5 TABLET ORAL NIGHTLY PRN
Qty: 30 TABLET | Refills: 0 | Status: SHIPPED | OUTPATIENT
Start: 2020-09-23 | End: 2020-11-09

## 2020-10-01 RX ORDER — BUPROPION HYDROCHLORIDE 200 MG/1
200 TABLET, EXTENDED RELEASE ORAL 2 TIMES DAILY
Qty: 60 TABLET | Refills: 3 | Status: SHIPPED | OUTPATIENT
Start: 2020-10-01 | End: 2021-02-08

## 2020-11-04 RX ORDER — BUSPIRONE HYDROCHLORIDE 10 MG/1
TABLET ORAL
Qty: 90 TABLET | Refills: 1 | Status: SHIPPED | OUTPATIENT
Start: 2020-11-04 | End: 2021-01-18

## 2020-11-04 NOTE — TELEPHONE ENCOUNTER
Refill Request     Last Seen: 9/21/2020    Last Written:4/2020      Next Appointment:   No future appointments.           Requested Prescriptions     Pending Prescriptions Disp Refills    busPIRone (BUSPAR) 10 MG tablet [Pharmacy Med Name: busPIRone HCl 10 MG Oral Tablet] 90 tablet 0     Sig: TAKE 1 TABLET BY MOUTH THREE TIMES DAILY AS NEEDED FOR  ANXIETY

## 2020-11-09 RX ORDER — ZOLPIDEM TARTRATE 5 MG/1
TABLET ORAL
Qty: 30 TABLET | Refills: 0 | Status: SHIPPED | OUTPATIENT
Start: 2020-11-09 | End: 2020-12-28

## 2020-12-10 ENCOUNTER — TELEPHONE (OUTPATIENT)
Dept: BARIATRICS/WEIGHT MGMT | Age: 40
End: 2020-12-10

## 2020-12-10 NOTE — TELEPHONE ENCOUNTER
Pt spouse calling for pt, pt s/p sleeve 10/08/18. Pt noticed about 2 days ago a lump around his top right incision, stated that is bulging out a bit. Pt denies that it hurts when touched.  I did let pt spouse that I will put a message into Dr Ashlee Rubalcava to see what he would like pt to do, testing prior to appt, or making an appt first.

## 2020-12-11 DIAGNOSIS — E03.9 HYPOTHYROIDISM, UNSPECIFIED TYPE: ICD-10-CM

## 2020-12-12 LAB
T4 FREE: 1.1 NG/DL (ref 0.9–1.8)
TSH SERPL DL<=0.05 MIU/L-ACNC: 9.51 UIU/ML (ref 0.27–4.2)

## 2020-12-13 RX ORDER — LEVOTHYROXINE SODIUM 0.03 MG/1
25 TABLET ORAL DAILY
Qty: 90 TABLET | Refills: 1 | Status: SHIPPED | OUTPATIENT
Start: 2020-12-13 | End: 2021-02-22 | Stop reason: SDUPTHER

## 2020-12-15 NOTE — TELEPHONE ENCOUNTER
Left voice message for patient to call [address] [place] [address] to schedule an appointment for CT scan in response from call on 12/10.

## 2020-12-17 ENCOUNTER — OFFICE VISIT (OUTPATIENT)
Dept: PRIMARY CARE CLINIC | Age: 40
End: 2020-12-17
Payer: COMMERCIAL

## 2020-12-17 PROCEDURE — G8417 CALC BMI ABV UP PARAM F/U: HCPCS | Performed by: NURSE PRACTITIONER

## 2020-12-17 PROCEDURE — 99211 OFF/OP EST MAY X REQ PHY/QHP: CPT | Performed by: NURSE PRACTITIONER

## 2020-12-17 PROCEDURE — G8428 CUR MEDS NOT DOCUMENT: HCPCS | Performed by: NURSE PRACTITIONER

## 2020-12-17 NOTE — PROGRESS NOTES
Kwadwo Jarrell received a viral test for COVID-19. They were educated on isolation and quarantine as appropriate. For any symptoms, they were directed to seek care from their PCP, given contact information to establish with a doctor, directed to an urgent care or the emergency room.

## 2020-12-18 LAB — SARS-COV-2, NAA: NOT DETECTED

## 2020-12-21 ENCOUNTER — HOSPITAL ENCOUNTER (OUTPATIENT)
Dept: CT IMAGING | Age: 40
Discharge: HOME | End: 2020-12-21
Payer: COMMERCIAL

## 2020-12-21 PROCEDURE — 6360000004 HC RX CONTRAST MEDICATION: Performed by: SURGERY

## 2020-12-21 PROCEDURE — 74178 CT ABD&PLV WO CNTR FLWD CNTR: CPT

## 2020-12-21 RX ADMIN — IOPAMIDOL 75 ML: 755 INJECTION, SOLUTION INTRAVENOUS at 14:27

## 2020-12-21 RX ADMIN — IOHEXOL 50 ML: 240 INJECTION, SOLUTION INTRATHECAL; INTRAVASCULAR; INTRAVENOUS; ORAL at 14:28

## 2020-12-28 RX ORDER — LEVOTHYROXINE SODIUM 200 UG/1
TABLET ORAL
Qty: 30 TABLET | Refills: 0 | Status: SHIPPED | OUTPATIENT
Start: 2020-12-28 | End: 2021-01-31 | Stop reason: SDUPTHER

## 2020-12-28 RX ORDER — ZOLPIDEM TARTRATE 5 MG/1
TABLET ORAL
Qty: 30 TABLET | Refills: 0 | Status: SHIPPED | OUTPATIENT
Start: 2020-12-28 | End: 2021-02-08

## 2020-12-29 ENCOUNTER — TELEPHONE (OUTPATIENT)
Dept: BARIATRICS/WEIGHT MGMT | Age: 40
End: 2020-12-29

## 2020-12-29 NOTE — TELEPHONE ENCOUNTER
Called the patient to discuss the results of the CT scan with him went to voicemail. Advised the patient to call back. She calls back please reassure him that the CT scan did not show any signs of hernia at the incisions.   If the patient still having pain or complaints he probably can need to be seen in the office for full exam.

## 2021-01-14 ENCOUNTER — VIRTUAL VISIT (OUTPATIENT)
Dept: FAMILY MEDICINE CLINIC | Age: 41
End: 2021-01-14
Payer: COMMERCIAL

## 2021-01-14 DIAGNOSIS — E03.9 HYPOTHYROIDISM, UNSPECIFIED TYPE: Primary | ICD-10-CM

## 2021-01-14 DIAGNOSIS — F41.9 ANXIETY AND DEPRESSION: ICD-10-CM

## 2021-01-14 DIAGNOSIS — F90.9 ATTENTION DEFICIT HYPERACTIVITY DISORDER (ADHD), UNSPECIFIED ADHD TYPE: ICD-10-CM

## 2021-01-14 DIAGNOSIS — F32.A ANXIETY AND DEPRESSION: ICD-10-CM

## 2021-01-14 PROCEDURE — 99214 OFFICE O/P EST MOD 30 MIN: CPT | Performed by: NURSE PRACTITIONER

## 2021-01-14 PROCEDURE — G8427 DOCREV CUR MEDS BY ELIG CLIN: HCPCS | Performed by: NURSE PRACTITIONER

## 2021-01-14 RX ORDER — DEXTROAMPHETAMINE SACCHARATE, AMPHETAMINE ASPARTATE, DEXTROAMPHETAMINE SULFATE AND AMPHETAMINE SULFATE 5; 5; 5; 5 MG/1; MG/1; MG/1; MG/1
20 TABLET ORAL DAILY
Qty: 30 TABLET | Refills: 0 | Status: SHIPPED | OUTPATIENT
Start: 2021-01-14 | End: 2021-01-22 | Stop reason: SDUPTHER

## 2021-01-14 ASSESSMENT — PATIENT HEALTH QUESTIONNAIRE - PHQ9
SUM OF ALL RESPONSES TO PHQ QUESTIONS 1-9: 0
SUM OF ALL RESPONSES TO PHQ QUESTIONS 1-9: 0

## 2021-01-14 ASSESSMENT — ENCOUNTER SYMPTOMS
WHEEZING: 0
SHORTNESS OF BREATH: 0

## 2021-01-14 NOTE — PROGRESS NOTES
2021    TELEHEALTH EVALUATION -- Audio/Visual (During OBXKJ-14 public health emergency)    HPI:    Butch Honeycutt. (:  1980) has requested an audio/video evaluation for the following concern(s):    Depression: mood stable on wellbutrin and buspar   Insomnia:ambien - uses sparingly  Hypothyroid: synthroid 225mcg     Migraines: did botox back in October- only 6 migraines since then- will get 2nd round soon     ADHD: was on adderall when he was a teen- had eval done by Dr. Natalie Lindsay- ADHD was diagnosed but migraines were not under control- no under control will try adderall again   Can't do extended release meds because of gastric sleeve surgery  Works Monday-Friday  7-330/4    Review of Systems   Constitutional: Negative for chills and fever. Respiratory: Negative for shortness of breath and wheezing. Cardiovascular: Negative for chest pain and palpitations. Neurological: Positive for headaches (migraines under better control). Psychiatric/Behavioral: Positive for decreased concentration. Mood stable        Prior to Visit Medications    Medication Sig Taking? Authorizing Provider   amphetamine-dextroamphetamine (ADDERALL) 20 MG tablet Take 1 tablet by mouth daily for 30 days.  Yes KUSUM Leger CNP   EUTHYROX 200 MCG tablet Take 1 tablet by mouth once daily Yes KUSUM Leger CNP   zolpidem (AMBIEN) 5 MG tablet TAKE 1 TABLET BY MOUTH NIGHTLY AS NEEDED FOR SLEEP Yes KUSUM Leger CNP   levothyroxine (SYNTHROID) 25 MCG tablet Take 1 tablet by mouth daily Yes KUSUM Leger CNP   busPIRone (BUSPAR) 10 MG tablet TAKE 1 TABLET BY MOUTH THREE TIMES DAILY AS NEEDED FOR  ANXIETY Yes KUSUM Colby CNP   buPROPion (WELLBUTRIN SR) 200 MG extended release tablet Take 1 tablet by mouth 2 times daily Yes KUSUM Leger CNP   Multiple Vitamins-Minerals (BARIATRIC FUSION) CHEW Take 4 tablets by mouth daily Yes Historical Provider, MD Social History     Tobacco Use    Smoking status: Former Smoker     Packs/day: 0.50     Years: 17.00     Pack years: 8.50     Types: Cigarettes     Quit date: 1/7/2018     Years since quitting: 3.0    Smokeless tobacco: Never Used   Substance Use Topics    Alcohol use: Yes     Comment: 1MTH    Drug use: No        Allergies   Allergen Reactions    Other Other (See Comments)     Hazelnuts, throat closes   ,   Past Medical History:   Diagnosis Date    Depression     Hemiplegic migraine 05/2020    Irritable     Nephrolith 5/2010    Sleep apnea     uses c-pap    Thyroid disease     hypothyroid - chronic lymphocytic thyroiditis   ,   Past Surgical History:   Procedure Laterality Date    HERNIA REPAIR  4/5/16    ventral    NV LAP, IGNACIO RESTRICT PROC, LONGITUDINAL GASTRECTOMY N/A 10/8/2018    LAPAROSCOPIC SLEEVE GASTRECTOMY-ETHICON performed by Jay Hurley MD at [address] [place] [address], PARTIAL  3/15    UPPER GASTROINTESTINAL ENDOSCOPY  05/11/2018    VASECTOMY N/A 2012    WISDOM TOOTH EXTRACTION     ,   Family History   Problem Relation Age of Onset    Depression Mother     Arthritis Father         RA    Cancer Sister         thyroid    Depression Sister     Cancer Paternal Grandfather         lung    Cancer Paternal Aunt         lung       PHYSICAL EXAMINATION:  [ INSTRUCTIONS:  \"[x]\" Indicates a positive item  \"[]\" Indicates a negative item  -- DELETE ALL ITEMS NOT EXAMINED]  Vital Signs: (As obtained by patient/caregiver or practitioner observation)    Blood pressure-  Heart rate-    Respiratory rate-    Temperature-  Pulse oximetry-     Constitutional: [x] Appears well-developed and well-nourished [x] No apparent distress      [] Abnormal-   Mental status  [x] Alert and awake  [x] Oriented to person/place/time [x]Able to follow commands      Eyes:  EOM    []  Normal  [] Abnormal-  Sclera  [x]  Normal  [] Abnormal -         Discharge []  None visible  [] Abnormal -    HENT: [x] Normocephalic, atraumatic. [] Abnormal   [] Mouth/Throat: Mucous membranes are moist.     External Ears [] Normal  [] Abnormal-     Neck: [] No visualized mass     Pulmonary/Chest: [x] Respiratory effort normal.  [x] No visualized signs of difficulty breathing or respiratory distress        [] Abnormal-      Musculoskeletal:   [] Normal gait with no signs of ataxia         [x] Normal range of motion of neck        [] Abnormal-       Neurological:        [x] No Facial Asymmetry (Cranial nerve 7 motor function) (limited exam to video visit)          [x] No gaze palsy        [] Abnormal-         Skin:        [x] No significant exanthematous lesions or discoloration noted on facial skin         [] Abnormal-            Psychiatric:       [x] Normal Affect [x] No Hallucinations        [] Abnormal-     Other pertinent observable physical exam findings-     ASSESSMENT/PLAN:  1. Hypothyroidism, unspecified type  Continue current dose for now until labs done in about 2 weeks  - TSH without Reflex; Future  - T4, Free; Future    2. Attention deficit hyperactivity disorder (ADHD), unspecified ADHD type  Will follow up in 1 month  - amphetamine-dextroamphetamine (ADDERALL) 20 MG tablet; Take 1 tablet by mouth daily for 30 days. Dispense: 30 tablet; Refill: 0    3. Anxiety and depression  Continue current meds      No follow-ups on file. Butch Honeycutt. is a 38 y.o. male being evaluated by a Virtual Visit (video visit) encounter to address concerns as mentioned above. A caregiver was present when appropriate. Due to this being a TeleHealth encounter (During ELE-82 public health emergency), evaluation of the following organ systems was limited: Vitals/Constitutional/EENT/Resp/CV/GI//MS/Neuro/Skin/Heme-Lymph-Imm. Pursuant to the emergency declaration under the [address] [place] [address] and the [employer] Act, this Virtual Visit was conducted with patient's (and/or legal guardian's) consent, to reduce the patient's risk of exposure to COVID-19 and provide necessary medical care. The patient (and/or legal guardian) has also been advised to contact this office for worsening conditions or problems, and seek emergency medical treatment and/or call 911 if deemed necessary. Patient identification was verified at the start of the visit: Yes    Total time spent on this encounter: Not billed by time    Services were provided through a video synchronous discussion virtually to substitute for in-person clinic visit. Patient and provider were located at their individual homes. --KUSUM Leger CNP on 1/14/2021 at 3:46 PM    An electronic signature was used to authenticate this note.

## 2021-01-22 ENCOUNTER — TELEPHONE (OUTPATIENT)
Dept: FAMILY MEDICINE CLINIC | Age: 41
End: 2021-01-22

## 2021-01-22 DIAGNOSIS — F90.9 ATTENTION DEFICIT HYPERACTIVITY DISORDER (ADHD), UNSPECIFIED ADHD TYPE: ICD-10-CM

## 2021-01-22 RX ORDER — DEXTROAMPHETAMINE SACCHARATE, AMPHETAMINE ASPARTATE, DEXTROAMPHETAMINE SULFATE AND AMPHETAMINE SULFATE 5; 5; 5; 5 MG/1; MG/1; MG/1; MG/1
20 TABLET ORAL DAILY
Qty: 30 TABLET | Refills: 0 | Status: SHIPPED | OUTPATIENT
Start: 2021-01-22 | End: 2021-02-22 | Stop reason: SDUPTHER

## 2021-01-22 NOTE — TELEPHONE ENCOUNTER
[address] CHARLIE Coughlin is calling to see if pt can get the adderall transferred to [facility] in [facility]? Pt has been trying to get adderall since 1/14/. It is on back order at [facility].

## 2021-01-27 ENCOUNTER — OFFICE VISIT (OUTPATIENT)
Dept: BARIATRICS/WEIGHT MGMT | Age: 41
End: 2021-01-27
Payer: COMMERCIAL

## 2021-01-27 VITALS
BODY MASS INDEX: 36.93 KG/M2 | DIASTOLIC BLOOD PRESSURE: 88 MMHG | HEART RATE: 76 BPM | WEIGHT: 297 LBS | RESPIRATION RATE: 16 BRPM | HEIGHT: 75 IN | OXYGEN SATURATION: 98 % | TEMPERATURE: 96.6 F | SYSTOLIC BLOOD PRESSURE: 130 MMHG

## 2021-01-27 DIAGNOSIS — E78.2 MIXED HYPERLIPIDEMIA: ICD-10-CM

## 2021-01-27 DIAGNOSIS — K21.9 CHRONIC GERD: ICD-10-CM

## 2021-01-27 DIAGNOSIS — Z98.84 S/P LAPAROSCOPIC SLEEVE GASTRECTOMY: Primary | ICD-10-CM

## 2021-01-27 DIAGNOSIS — E66.9 OBESITY (BMI 30-39.9): ICD-10-CM

## 2021-01-27 DIAGNOSIS — E55.9 VITAMIN D DEFICIENCY: ICD-10-CM

## 2021-01-27 DIAGNOSIS — K80.20 GALLSTONES: ICD-10-CM

## 2021-01-27 DIAGNOSIS — G47.33 OBSTRUCTIVE SLEEP APNEA: ICD-10-CM

## 2021-01-27 PROCEDURE — 1036F TOBACCO NON-USER: CPT | Performed by: NURSE PRACTITIONER

## 2021-01-27 PROCEDURE — G8484 FLU IMMUNIZE NO ADMIN: HCPCS | Performed by: NURSE PRACTITIONER

## 2021-01-27 PROCEDURE — G8417 CALC BMI ABV UP PARAM F/U: HCPCS | Performed by: NURSE PRACTITIONER

## 2021-01-27 PROCEDURE — 99213 OFFICE O/P EST LOW 20 MIN: CPT | Performed by: NURSE PRACTITIONER

## 2021-01-27 PROCEDURE — G8427 DOCREV CUR MEDS BY ELIG CLIN: HCPCS | Performed by: NURSE PRACTITIONER

## 2021-01-27 RX ORDER — FAMOTIDINE 20 MG/1
20 TABLET, FILM COATED ORAL 2 TIMES DAILY
COMMUNITY
End: 2021-06-14 | Stop reason: ALTCHOICE

## 2021-01-27 RX ORDER — M-VIT,TX,IRON,MINS/CALC/FOLIC 27MG-0.4MG
1 TABLET ORAL DAILY
COMMUNITY

## 2021-01-27 RX ORDER — OMEPRAZOLE 20 MG/1
20 CAPSULE, DELAYED RELEASE ORAL DAILY
Qty: 30 CAPSULE | Refills: 0 | Status: SHIPPED | OUTPATIENT
Start: 2021-01-27 | End: 2021-06-14 | Stop reason: ALTCHOICE

## 2021-01-27 ASSESSMENT — ENCOUNTER SYMPTOMS
GASTROINTESTINAL NEGATIVE: 1
RESPIRATORY NEGATIVE: 1

## 2021-01-27 NOTE — PROGRESS NOTES
[facility]) Physicians   [employer] [facility] [employer]    Subjective:      Patient ID: Sincere Jiménez is a 38 y.o. male    HPI    2 years 3 months s/p sleeve gastrectomy. He was last seen for follow up at 6 months post-op. Sincere Jiménez is a very pleasant 38 y.o. male , Body mass index is 37.12 kg/m². Jeanie Crabtree Patient denies any nausea, vomiting, fevers, chills, shortness of breath, chest pain,constipation or urinary symptoms. Denies any  dysphagia. He reports he has been having diarrhea after eating for past few months. He had CT scan of abdomen completed due to concern for bulging of RUQ. CT did not show hernia but did show gallstones. He does also report bloating sensation after eating. He denies any abdominal pain. He also reports reflux symptoms, taking Pepcid BID right now. He has put on some weight over the past year. He would like to get back to losing weight.     Past Medical History:   Diagnosis Date    Depression     Hemiplegic migraine 05/2020    Irritable     Nephrolith 5/2010    Sleep apnea     uses c-pap    Thyroid disease     hypothyroid - chronic lymphocytic thyroiditis     Past Surgical History:   Procedure Laterality Date    HERNIA REPAIR  4/5/16    ventral    ME LAP, IGNACIO RESTRICT PROC, LONGITUDINAL GASTRECTOMY N/A 10/8/2018    LAPAROSCOPIC SLEEVE GASTRECTOMY-ETHICON performed by Bernabe Wu MD at [address] [place] [address], PARTIAL  3/15    UPPER GASTROINTESTINAL ENDOSCOPY  05/11/2018    VASECTOMY N/A 2012    WISDOM TOOTH EXTRACTION       Family History   Problem Relation Age of Onset    Depression Mother     Arthritis Father         RA    Cancer Sister         thyroid    Depression Sister     Cancer Paternal Grandfather         lung    Cancer Paternal Aunt         lung     Social History     Tobacco Use    Smoking status: Former Smoker     Packs/day: 0.50     Years: 17.00     Pack years: 8.50     Types: Cigarettes     Quit date: 1/7/2018 Tenderness: There is no abdominal tenderness. Hernia: No hernia is present. Skin:     General: Skin is warm and dry. Neurological:      Mental Status: He is alert and oriented to person, place, and time. Psychiatric:         Behavior: Behavior normal.         Thought Content:  Thought content normal.         Judgment: Judgment normal.           Assessment and Plan: Patient is 1 years 3 months s/p sleeve gastrectomy, up 10 lbs with a total weight loss of 45.5lbs. The patient's current Body mass index is 37.12 kg/m². (1/27/21). He is doing well, denies n/v/dysphagia. For his reflux, he will continue his Pepcid but only at night. We will add Prilosec in the morning, script sent to his pharmacy. He will also reduce his portions to 1 cup per sitting. His diarrhea and bloating likely due to his gallstones, referral placed to Dr. Jade Kanner for further evaluation. He  is tolerating diet, getting adequate fluids and protein. He was given 1200 calorie post op meal plan to follow. He was also advised to track his calories. He has tracked recently and found his daily intake to be around 2000 calories a day. We discussed goal of 1200/calories a day. He  is exercising 3-4 times a week at the gym. Encouraged continued physical activity. Heis taking vitamins, we provided him with handout for proper vitamin usage. He  did meet with the registered dietitian for continued follow up. I agree with recommendations and plan. He is asking about revisional surgery. I did inform him that Dr. Maria C Sam does not do revisional surgeries. We discussed that he could join our MWM program and discussed these options briefly. He was encouraged to make a NP appt with Dr. Jermaine Parr for MWM. He is also due for labs. Labs were ordered at this visit. Patient understands it is their responsibility to have these completed and to obtain results from our office. he gives me permission to leave test results on his voicemail. We will see him back in 6 weeks for continued follow up. Total encounter time: 20 minutes, including any number of the following: Bariatric Pre/Post operative work up/protocols, review of labs, imaging, provider notes, outside hospital records, performing examination/evaluation, counseling patient and/or family, ordering medications/tests, placing referrals and communication with referring physicians, coordination of care; discussing dietary plan/recall with the patient as well with registered dietitian and documentation in the EHR. Of note, the above was done during same day of the actual patient encounter.

## 2021-01-27 NOTE — PROGRESS NOTES
Dietary Assessment Note      Vitals:   Vitals:    21 1007   Height: 6' 3\" (1.905 m)    Patient gained 10 lbs over past year and 10 months. Total Weight Loss: 45.5 lbs    Labs reviewed:     Results for Jacey Pennington \"ELAINA\" (MRN [id number]) as of 2021 10:58   Ref. Range 2020 06:05   Sodium Latest Ref Range: 136 - 145 mmol/L 137   Potassium Latest Ref Range: 3.5 - 5.1 mmol/L 4.1   Chloride Latest Ref Range: 99 - 110 mmol/L 103   CO2 Latest Ref Range: 21 - 32 mmol/L 25   BUN Latest Ref Range: 7 - 20 mg/dL 13   Creatinine Latest Ref Range: 0.9 - 1.3 mg/dL 0.9   Anion Gap Latest Ref Range: 3 - 16  9   GFR Non- Latest Ref Range: >60  >60   GFR  Latest Ref Range: >60  >60   Glucose Latest Ref Range: 70 - 99 mg/dL 96   Calcium Latest Ref Range: 8.3 - 10.6 mg/dL 9.2     Protein intake: 60-80 grams/day     Fluid intake: 48-64 oz/day    Multivitamin/mineral intake: mens daily MVI 1 x day      Calcium intake: none     Other: none     Exercise: Gym 3-4 x week high intensity cardio 2 x week + 2 water classes for 45 minutes - 1 hour. Nutrition Assessment: 2 year 3 mo s/p sleeve post-op visit. Pt not currently tracking intakes, feels he is in a routine with his eating.      Breakfast: Yamileth donouts - 20 oz coffee with equal + 2 egg and cheese flatbread wraps     Snack: 2 oz cheese + 2 oz turkey/chicken + 1 cheese stick + slim gavi     Lunch:  2 oz cheese + 2 oz turkey/chicken + 1 cheese stick + slim gavi     Snack: 2 cheese sticks + 2 small slim jims     Snack: PB sandwich on wheat     Dinner: chicken breast with mac and cheese     Snack: nothing     Fluids: Coffee, powerade zero, water, sweet tea     Amount able to eat per sittin cups +    Following  rule: Yes    Food Intolerances/issues: milk     Client Concerns: wants to lose ~ 40 more lbs; also reports last 4-5 months having diarrhea all day long within 30 minutes of eating - has been undergoing testing Goals:   Track intakes - discussed goal of 1200 calories/day  Start 1200 kcal post op meal plan   Limit portions to 1.5 cups/sitting   Eliminate sweet tea - try with sweetener   Take 2 MVI + add in 2 citracal petite to take in evening   Reviewed fat/sugar guidelines  Continue with exercise     Handouts: 1200 kcal post op meal plan + MVI alternative     Plan: F/U at 3 years and prn     Joan Edwards

## 2021-02-01 RX ORDER — LEVOTHYROXINE SODIUM 0.2 MG/1
200 TABLET ORAL DAILY
Qty: 30 TABLET | Refills: 0 | Status: SHIPPED | OUTPATIENT
Start: 2021-02-01 | End: 2021-02-23 | Stop reason: SDUPTHER

## 2021-02-11 ENCOUNTER — TELEMEDICINE (OUTPATIENT)
Dept: BARIATRICS/WEIGHT MGMT | Age: 41
End: 2021-02-11
Payer: COMMERCIAL

## 2021-02-11 DIAGNOSIS — R19.00 ABDOMINAL SWELLING: Primary | ICD-10-CM

## 2021-02-11 DIAGNOSIS — Z98.84 S/P LAPAROSCOPIC SLEEVE GASTRECTOMY: ICD-10-CM

## 2021-02-11 DIAGNOSIS — E66.9 OBESITY (BMI 30-39.9): ICD-10-CM

## 2021-02-11 PROCEDURE — 99203 OFFICE O/P NEW LOW 30 MIN: CPT | Performed by: SURGERY

## 2021-02-15 NOTE — PROGRESS NOTES
 Cancer Paternal Grandfather         lung    Cancer Paternal Aunt         lung     Social History     Tobacco Use    Smoking status: Former Smoker     Packs/day: 0.50     Years: 17.00     Pack years: 8.50     Types: Cigarettes     Quit date: 1/7/2018     Years since quitting: 3.1    Smokeless tobacco: Never Used   Substance Use Topics    Alcohol use: Yes     Comment: 1MTH     I counseled the patient on the importance of not smoking and risks of ETOH. Allergies   Allergen Reactions    Other Other (See Comments)     Hazelnuts, throat closes     There were no vitals filed for this visit. There is no height or weight on file to calculate BMI. Current Outpatient Medications:     buPROPion (WELLBUTRIN SR) 200 MG extended release tablet, Take 1 tablet by mouth twice daily, Disp: 60 tablet, Rfl: 11    zolpidem (AMBIEN) 5 MG tablet, TAKE 1 TABLET BY MOUTH NIGHTLY AS NEEDED FOR SLEEP, Disp: 30 tablet, Rfl: 0    levothyroxine (EUTHYROX) 200 MCG tablet, Take 1 tablet by mouth Daily, Disp: 30 tablet, Rfl: 0    Multiple Vitamins-Minerals (THERAPEUTIC MULTIVITAMIN-MINERALS) tablet, Take 1 tablet by mouth daily, Disp: , Rfl:     famotidine (PEPCID) 20 MG tablet, Take 20 mg by mouth 2 times daily, Disp: , Rfl:     omeprazole (PRILOSEC) 20 MG delayed release capsule, Take 1 capsule by mouth daily, Disp: 30 capsule, Rfl: 0    amphetamine-dextroamphetamine (ADDERALL) 20 MG tablet, Take 1 tablet by mouth daily for 30 days. , Disp: 30 tablet, Rfl: 0    busPIRone (BUSPAR) 10 MG tablet, TAKE 1 TABLET BY MOUTH THREE TIMES DAILY AS NEEDED FOR ANXIETY, Disp: 90 tablet, Rfl: 5    levothyroxine (SYNTHROID) 25 MCG tablet, Take 1 tablet by mouth daily, Disp: 90 tablet, Rfl: 1    ROS  Review of Systems - History obtained from the patient  General ROS: negative  Psychological ROS: negative  Ophthalmic ROS: negative  Neurological ROS: negative  ENT ROS: negative  Allergy and Immunology ROS: negative Hematological and Lymphatic ROS: negative  Endocrine ROS: negative  Breast ROS: negative  Respiratory ROS: negative  Cardiovascular ROS: negative  Gastrointestinal ROS: negative  Genito-Urinary ROS: negative  Musculoskeletal ROS: negative   Skin ROS: Swelling        Physical Exam   Deferred      Radiology:  I have personally reviewed the radiological studies and discussed it with the patient.     Patient Active Problem List   Diagnosis    Chronic lymphocytic thyroiditis    Primary insomnia    Irritable    Class 2 obesity due to excess calories without serious comorbidity in adult    Morbid obesity with BMI of 40.0-44.9, adult (HCC)    Snoring    Chronic GERD    Precordial pain    MILLER (dyspnea on exertion)    Mixed hyperlipidemia    Vitamin D deficiency    Obstructive sleep apnea    S/P laparoscopic sleeve gastrectomy    Severe obesity (BMI 35.0-35.9 with comorbidity) ([facility] [address]    Pain of right shoulder region    Strain of right shoulder    Lower extremity paralysis (HCC)         A/P:  Tim Lopez. is a very pleasant 38 y.o. male with RUQ swelling    No evidence of hernia or gallbladder dysfunction    Counseled on weight loss    Will follow up with [facility] for further non-surgical weight loss options    Bereket Love

## 2021-02-22 DIAGNOSIS — E78.2 MIXED HYPERLIPIDEMIA: ICD-10-CM

## 2021-02-22 DIAGNOSIS — E66.9 OBESITY (BMI 30-39.9): ICD-10-CM

## 2021-02-22 DIAGNOSIS — E55.9 VITAMIN D DEFICIENCY: ICD-10-CM

## 2021-02-22 DIAGNOSIS — Z98.84 S/P LAPAROSCOPIC SLEEVE GASTRECTOMY: ICD-10-CM

## 2021-02-22 DIAGNOSIS — E03.9 HYPOTHYROIDISM, UNSPECIFIED TYPE: ICD-10-CM

## 2021-02-22 LAB
A/G RATIO: 1.4 (ref 1.1–2.2)
ALBUMIN SERPL-MCNC: 4.1 G/DL (ref 3.4–5)
ALP BLD-CCNC: 52 U/L (ref 40–129)
ALT SERPL-CCNC: 13 U/L (ref 10–40)
ANION GAP SERPL CALCULATED.3IONS-SCNC: 9 MMOL/L (ref 3–16)
AST SERPL-CCNC: 16 U/L (ref 15–37)
BASOPHILS ABSOLUTE: 0 K/UL (ref 0–0.2)
BASOPHILS RELATIVE PERCENT: 0.4 %
BILIRUB SERPL-MCNC: 0.4 MG/DL (ref 0–1)
BUN BLDV-MCNC: 14 MG/DL (ref 7–20)
CALCIUM SERPL-MCNC: 9.5 MG/DL (ref 8.3–10.6)
CHLORIDE BLD-SCNC: 105 MMOL/L (ref 99–110)
CHOLESTEROL, TOTAL: 156 MG/DL (ref 0–199)
CO2: 28 MMOL/L (ref 21–32)
CREAT SERPL-MCNC: 0.9 MG/DL (ref 0.9–1.3)
EOSINOPHILS ABSOLUTE: 0.1 K/UL (ref 0–0.6)
EOSINOPHILS RELATIVE PERCENT: 2.3 %
FOLATE: >20 NG/ML (ref 4.78–24.2)
GFR AFRICAN AMERICAN: >60
GFR NON-AFRICAN AMERICAN: >60
GLOBULIN: 2.9 G/DL
GLUCOSE BLD-MCNC: 88 MG/DL (ref 70–99)
HCT VFR BLD CALC: 43.1 % (ref 40.5–52.5)
HDLC SERPL-MCNC: 35 MG/DL (ref 40–60)
HEMOGLOBIN: 14.7 G/DL (ref 13.5–17.5)
IRON SATURATION: 22 % (ref 20–50)
IRON: 66 UG/DL (ref 59–158)
LDL CHOLESTEROL CALCULATED: 69 MG/DL
LYMPHOCYTES ABSOLUTE: 2.3 K/UL (ref 1–5.1)
LYMPHOCYTES RELATIVE PERCENT: 43.2 %
MCH RBC QN AUTO: 29.6 PG (ref 26–34)
MCHC RBC AUTO-ENTMCNC: 34.2 G/DL (ref 31–36)
MCV RBC AUTO: 86.8 FL (ref 80–100)
MONOCYTES ABSOLUTE: 0.5 K/UL (ref 0–1.3)
MONOCYTES RELATIVE PERCENT: 8.8 %
NEUTROPHILS ABSOLUTE: 2.4 K/UL (ref 1.7–7.7)
NEUTROPHILS RELATIVE PERCENT: 45.3 %
PDW BLD-RTO: 13.4 % (ref 12.4–15.4)
PLATELET # BLD: 210 K/UL (ref 135–450)
PMV BLD AUTO: 9.1 FL (ref 5–10.5)
POTASSIUM SERPL-SCNC: 4.4 MMOL/L (ref 3.5–5.1)
RBC # BLD: 4.97 M/UL (ref 4.2–5.9)
SODIUM BLD-SCNC: 142 MMOL/L (ref 136–145)
T4 FREE: 1.5 NG/DL (ref 0.9–1.8)
TOTAL IRON BINDING CAPACITY: 299 UG/DL (ref 260–445)
TOTAL PROTEIN: 7 G/DL (ref 6.4–8.2)
TRIGL SERPL-MCNC: 262 MG/DL (ref 0–150)
TSH SERPL DL<=0.05 MIU/L-ACNC: 6.11 UIU/ML (ref 0.27–4.2)
VITAMIN B-12: 486 PG/ML (ref 211–911)
VITAMIN D 25-HYDROXY: 31.1 NG/ML
VLDLC SERPL CALC-MCNC: 52 MG/DL
WBC # BLD: 5.2 K/UL (ref 4–11)

## 2021-02-22 RX ORDER — LEVOTHYROXINE SODIUM 0.05 MG/1
50 TABLET ORAL DAILY
Qty: 90 TABLET | Refills: 0 | Status: SHIPPED | OUTPATIENT
Start: 2021-02-22 | End: 2021-05-24

## 2021-02-23 LAB
ESTIMATED AVERAGE GLUCOSE: 99.7 MG/DL
HBA1C MFR BLD: 5.1 %

## 2021-02-25 LAB
ALPHA-TOCOPHEROL: 9.5 MG/L (ref 5.5–18)
GAMMA-TOCOPHEROL: 1.4 MG/L (ref 0–6)
RETINYL PALMITATE: 0.03 MG/L (ref 0–0.1)
VITAMIN A LEVEL: 0.63 MG/L (ref 0.3–1.2)
VITAMIN A, INTERP: NORMAL

## 2021-02-26 LAB — VITAMIN B1 WHOLE BLOOD: 165 NMOL/L (ref 70–180)

## 2021-03-03 ENCOUNTER — TELEPHONE (OUTPATIENT)
Dept: BARIATRICS/WEIGHT MGMT | Age: 41
End: 2021-03-03

## 2021-03-26 DIAGNOSIS — F90.9 ATTENTION DEFICIT HYPERACTIVITY DISORDER (ADHD), UNSPECIFIED ADHD TYPE: ICD-10-CM

## 2021-03-26 RX ORDER — DEXTROAMPHETAMINE SACCHARATE, AMPHETAMINE ASPARTATE, DEXTROAMPHETAMINE SULFATE AND AMPHETAMINE SULFATE 5; 5; 5; 5 MG/1; MG/1; MG/1; MG/1
20 TABLET ORAL 2 TIMES DAILY
Qty: 60 TABLET | Refills: 0 | Status: SHIPPED | OUTPATIENT
Start: 2021-03-26 | End: 2021-05-02 | Stop reason: SDUPTHER

## 2021-03-31 ENCOUNTER — OFFICE VISIT (OUTPATIENT)
Dept: ORTHOPEDIC SURGERY | Age: 41
End: 2021-03-31
Payer: COMMERCIAL

## 2021-03-31 VITALS — BODY MASS INDEX: 36.93 KG/M2 | WEIGHT: 297 LBS | HEIGHT: 75 IN

## 2021-03-31 DIAGNOSIS — M54.50 LUMBAR SPINE PAIN: Primary | ICD-10-CM

## 2021-03-31 DIAGNOSIS — M54.16 LUMBAR RADICULITIS: ICD-10-CM

## 2021-03-31 PROCEDURE — G8417 CALC BMI ABV UP PARAM F/U: HCPCS | Performed by: NURSE PRACTITIONER

## 2021-03-31 PROCEDURE — 1036F TOBACCO NON-USER: CPT | Performed by: NURSE PRACTITIONER

## 2021-03-31 PROCEDURE — G8427 DOCREV CUR MEDS BY ELIG CLIN: HCPCS | Performed by: NURSE PRACTITIONER

## 2021-03-31 PROCEDURE — 99213 OFFICE O/P EST LOW 20 MIN: CPT | Performed by: NURSE PRACTITIONER

## 2021-03-31 PROCEDURE — G8484 FLU IMMUNIZE NO ADMIN: HCPCS | Performed by: NURSE PRACTITIONER

## 2021-03-31 RX ORDER — METHYLPREDNISOLONE 4 MG/1
TABLET ORAL
Qty: 1 KIT | Refills: 0 | Status: SHIPPED | OUTPATIENT
Start: 2021-03-31 | End: 2021-06-14 | Stop reason: ALTCHOICE

## 2021-03-31 NOTE — PROGRESS NOTES
[address]    History of present illness:   Mr. Ata Betts  is a pleasant 38 y.o. male here today for evaluation regarding his left-sided low back pain extending into his left posterior thigh. Patient reports onset of symptoms was yesterday without any specific inciting event or injury. His symptoms seem to worse when bearing weight on his left leg when standing up. He denies any associated numbness or tingling or weakness. Patient had a unique complex migraine/seizure back in May of last year and underwent MRIs of the cervical thoracic and lumbar spines at that time. He denies any low back issues at that time. He denies any bowel or bladder dysfunction. He did try ibuprofen with limited benefit. He works in a sitting/standing position and wishes to return without restrictions tomorrow. Past history:  His past medical, surgical and social history has been reviewed. His  medications and allergies were reviewed. Review of symptoms:  Pertinent items are noted in HPI. Review of systems reviewed from Patient History Form dated on 3/31/21 and available in the patient's chart under the Media tab. Physical examination:  Mr. Yajaira Alaniz Jr.'s most recent vitals:  Vitals  Height: 6' 3\" (190.5 cm)  Weight: 297 lb (134.7 kg)  Body mass index is 37.12 kg/m². He is well-developed and well-nourished, is in no obvious pain and alert and oriented to person, place, and time. He demonstrates appropriate mood and affect. His skin is warm and dry. His gait is largely normal and he walks without assistive devices. He stands with slight lumbar flexion. His lumbar flexion, extension and lateral bending are moderately reduced with pain. He has mild tenderness over his lumbar spine without obvious muscle spasm. The skin over his lumbar spine is normal without a surgical scar. He has 5/5 motor strength of bilateral lower extremities.  He has a negative straight leg raise, bilaterally. 1+ deep tendon reflexes at knees. Sensation is intact to light touch L3 to S1 bilaterally. He has no clonus. Hip range of motion painless. Imaging:  I reviewed 3 views of the lumbar spine obtained in the office today. The images are largely unremarkable. MRI of the lumbar spine from 5/9/2020 was also reviewed today. Perhaps minimal right-sided disc bulge L4-5 without obvious neural impingement, but otherwise images are largely unremarkable. Assessment:  Lumbar radiculitis    Plan:  Medrol Dosepak was sent to the patient's pharmacy. We did discuss activity modifications. He would like to return to work tomorrow without restrictions. I think this is reasonable. He was advised on some stretching exercises at home and was given a prescription for physical therapy. He will follow up with our nonoperative spine team as needed in 1 to 2 weeks.

## 2021-05-02 DIAGNOSIS — F90.9 ATTENTION DEFICIT HYPERACTIVITY DISORDER (ADHD), UNSPECIFIED ADHD TYPE: ICD-10-CM

## 2021-05-03 RX ORDER — DEXTROAMPHETAMINE SACCHARATE, AMPHETAMINE ASPARTATE, DEXTROAMPHETAMINE SULFATE AND AMPHETAMINE SULFATE 5; 5; 5; 5 MG/1; MG/1; MG/1; MG/1
20 TABLET ORAL 2 TIMES DAILY
Qty: 60 TABLET | Refills: 0 | Status: SHIPPED | OUTPATIENT
Start: 2021-05-03 | End: 2021-06-02 | Stop reason: SDUPTHER

## 2021-05-03 NOTE — TELEPHONE ENCOUNTER
I left voicemail for patient with lab results, okay per last office visit. I explained that his triglycerides were elevated and HDL low, we should see improvement with both of these as he continues with diet and exercise. His TSH is high, does appear adjustments to his thyroid medication have already been made since these labs were drawn, but I encouraged him to reach out to prescribing provider if he has not already been in touch with them. All other labs reviewed. he was encouraged to call our office with any questions or concerns.
5

## 2021-05-24 DIAGNOSIS — E03.9 HYPOTHYROIDISM, UNSPECIFIED TYPE: Primary | ICD-10-CM

## 2021-05-24 RX ORDER — LEVOTHYROXINE SODIUM 0.05 MG/1
TABLET ORAL
Qty: 90 TABLET | Refills: 0 | Status: SHIPPED | OUTPATIENT
Start: 2021-05-24 | End: 2021-08-26

## 2021-05-28 DIAGNOSIS — E03.9 HYPOTHYROIDISM, UNSPECIFIED TYPE: Primary | ICD-10-CM

## 2021-05-28 RX ORDER — LEVOTHYROXINE SODIUM 0.2 MG/1
200 TABLET ORAL DAILY
Qty: 30 TABLET | Refills: 3 | Status: SHIPPED | OUTPATIENT
Start: 2021-05-28 | End: 2021-09-23

## 2021-05-28 NOTE — TELEPHONE ENCOUNTER
Last Office Visit  -  1/14/21  Next Office Visit  -  None    Last Filled  -    Last UDS -    Contract -

## 2021-05-28 NOTE — TELEPHONE ENCOUNTER
Received call from wife stating only one of patient thyroid medications was phoned in. She is requesting a rx for   levothyroxine (EUTHYROX) 200 MCG tablet for patient.   [facility]      VV on 1/14/21  Next office visit   Visit date not found

## 2021-06-02 DIAGNOSIS — F90.9 ATTENTION DEFICIT HYPERACTIVITY DISORDER (ADHD), UNSPECIFIED ADHD TYPE: ICD-10-CM

## 2021-06-02 NOTE — TELEPHONE ENCOUNTER
Last Office Visit  -  1/14/21  Next Office Visit  -  None    Last Filled  -  5/3/21  Last UDS -  None   Contract -  None

## 2021-06-07 RX ORDER — DEXTROAMPHETAMINE SACCHARATE, AMPHETAMINE ASPARTATE, DEXTROAMPHETAMINE SULFATE AND AMPHETAMINE SULFATE 5; 5; 5; 5 MG/1; MG/1; MG/1; MG/1
20 TABLET ORAL 2 TIMES DAILY
Qty: 60 TABLET | Refills: 0 | Status: SHIPPED | OUTPATIENT
Start: 2021-06-07 | End: 2021-07-06 | Stop reason: SDUPTHER

## 2021-06-07 NOTE — TELEPHONE ENCOUNTER
[address] Dr[address] Danny Mckenna [facility] Support  Subject: Refill Request     QUESTIONS   Name of Medication? amphetamine-dextroamphetamine (ADDERALL) 20 MG tablet   Patient-reported dosage and instructions? 2 times per day   How many days do you have left? 0   Preferred Pharmacy? [id number]   Pharmacy phone number (if available)? [phone]   ---------------------------------------------------------------------------   --------------   Abdulaziz MARCUS   What is the best way for the office to contact you? OK to leave message on   voicemail, OK to respond with electronic message via [employer] portal (only   for patients who have registered [employer] account)   Preferred Call Back Phone Number?  [phone]

## 2021-06-14 ENCOUNTER — TELEMEDICINE (OUTPATIENT)
Dept: FAMILY MEDICINE CLINIC | Age: 41
End: 2021-06-14
Payer: COMMERCIAL

## 2021-06-14 DIAGNOSIS — G47.00 INSOMNIA, UNSPECIFIED TYPE: ICD-10-CM

## 2021-06-14 DIAGNOSIS — E03.9 HYPOTHYROIDISM, UNSPECIFIED TYPE: ICD-10-CM

## 2021-06-14 DIAGNOSIS — F90.2 ATTENTION DEFICIT HYPERACTIVITY DISORDER (ADHD), COMBINED TYPE: Primary | ICD-10-CM

## 2021-06-14 PROCEDURE — G8427 DOCREV CUR MEDS BY ELIG CLIN: HCPCS | Performed by: NURSE PRACTITIONER

## 2021-06-14 PROCEDURE — 1036F TOBACCO NON-USER: CPT | Performed by: NURSE PRACTITIONER

## 2021-06-14 PROCEDURE — G8417 CALC BMI ABV UP PARAM F/U: HCPCS | Performed by: NURSE PRACTITIONER

## 2021-06-14 PROCEDURE — 99214 OFFICE O/P EST MOD 30 MIN: CPT | Performed by: NURSE PRACTITIONER

## 2021-06-14 ASSESSMENT — ENCOUNTER SYMPTOMS
WHEEZING: 0
SHORTNESS OF BREATH: 0

## 2021-06-14 NOTE — PROGRESS NOTES
2021    TELEHEALTH EVALUATION -- Audio/Visual (During VAFIW-97 public health emergency)    HPI:    Sumaya Bennett. (:  1980) has requested an audio/video evaluation for the following concern(s):    ADHD: adderall 20mg BID- doing better at work. No trouble eating- notices trouble sleeping if he takes 2nd dose after 3 so tried to take 2nd dose around noon. Eating well. Hypothyroid: will get labs done soon- lab orders are placed     Insomnia: takes ambien occasionally- does not need refill at this time    Review of Systems   Constitutional: Negative for chills and fever. Respiratory: Negative for shortness of breath and wheezing. Cardiovascular: Negative for chest pain and palpitations. Psychiatric/Behavioral: Negative for decreased concentration and sleep disturbance. Nervous/anxious: mood stable. Prior to Visit Medications    Medication Sig Taking? Authorizing Provider   amphetamine-dextroamphetamine (ADDERALL) 20 MG tablet Take 1 tablet by mouth 2 times daily for 30 days.  Yes KUSUM Hand CNP   levothyroxine (EUTHYROX) 200 MCG tablet Take 1 tablet by mouth Daily Yes KUSUM Hand CNP   levothyroxine (SYNTHROID) 50 MCG tablet TAKE 1 TABLET BY MOUTH ONCE DAILY **ADD  TO  200  MCG  DOSE** Yes KUSUM Hand CNP   buPROPion (WELLBUTRIN SR) 200 MG extended release tablet Take 1 tablet by mouth twice daily Yes KUSUM Hand CNP   Multiple Vitamins-Minerals (THERAPEUTIC MULTIVITAMIN-MINERALS) tablet Take 1 tablet by mouth daily Yes Historical Provider, MD   busPIRone (BUSPAR) 10 MG tablet TAKE 1 TABLET BY MOUTH THREE TIMES DAILY AS NEEDED FOR ANXIETY Yes KUSUM Hand CNP       Social History     Tobacco Use    Smoking status: Former Smoker     Packs/day: 0.50     Years: 17.00     Pack years: 8.50     Types: Cigarettes     Quit date: 2018     Years since quitting: 3.4    Smokeless tobacco: Never Used   Vaping Use    Vaping Use: Never used   Substance Use Topics    Alcohol use: Yes     Comment: 1MTH    Drug use: No        Allergies   Allergen Reactions    Other Other (See Comments)     Hazelnuts, throat closes   ,   Past Medical History:   Diagnosis Date    Depression     Hemiplegic migraine 05/2020    Irritable     Nephrolith 5/2010    Sleep apnea     uses c-pap    Thyroid disease     hypothyroid - chronic lymphocytic thyroiditis   ,   Past Surgical History:   Procedure Laterality Date    HERNIA REPAIR  4/5/16    ventral    OR LAP, IGNACIO RESTRICT PROC, LONGITUDINAL GASTRECTOMY N/A 10/8/2018    LAPAROSCOPIC SLEEVE GASTRECTOMY-ETHICON performed by Bruno Peña MD at [address] [place] [address], PARTIAL  3/15    UPPER GASTROINTESTINAL ENDOSCOPY  05/11/2018    VASECTOMY N/A 2012    WISDOM TOOTH EXTRACTION     ,   Family History   Problem Relation Age of Onset    Depression Mother     Arthritis Father         RA    Cancer Sister         thyroid    Depression Sister     Cancer Paternal Grandfather         lung    Cancer Paternal Aunt         lung       PHYSICAL EXAMINATION:  [ INSTRUCTIONS:  \"[x]\" Indicates a positive item  \"[]\" Indicates a negative item  -- DELETE ALL ITEMS NOT EXAMINED]  Vital Signs: (As obtained by patient/caregiver or practitioner observation)    Blood pressure-  Heart rate-    Respiratory rate-    Temperature-  Pulse oximetry-     Constitutional: [x] Appears well-developed and well-nourished [x] No apparent distress      [] Abnormal-   Mental status  [x] Alert and awake  [x] Oriented to person/place/time [x]Able to follow commands      Eyes:  EOM    []  Normal  [] Abnormal-  Sclera  [x]  Normal  [] Abnormal -         Discharge []  None visible  [] Abnormal -    HENT:   [x] Normocephalic, atraumatic.   [] Abnormal   [] Mouth/Throat: Mucous membranes are moist.     External Ears [x] Normal  [] Abnormal-     Neck: [] No visualized mass     Pulmonary/Chest: [x] Respiratory effort normal.  [x] No visualized signs of difficulty breathing or respiratory distress        [] Abnormal-      Musculoskeletal:   [x] Normal gait with no signs of ataxia         [x] Normal range of motion of neck        [] Abnormal-       Neurological:        [x] No Facial Asymmetry (Cranial nerve 7 motor function) (limited exam to video visit)          [] No gaze palsy        [] Abnormal-         Skin:        [x] No significant exanthematous lesions or discoloration noted on facial skin         [] Abnormal-            Psychiatric:       [x] Normal Affect [x] No Hallucinations        [] Abnormal-     Other pertinent observable physical exam findings-     ASSESSMENT/PLAN:  1. Attention deficit hyperactivity disorder (ADHD), combined type  Stable- current on current doses    2. Hypothyroidism, unspecified type  Will get labs done- continue meds    3. Insomnia, unspecified type  ambien PRN       Return in about 3 months (around 9/14/2021) for f/u med check. Erlin Cabezas, was evaluated through a synchronous (real-time) audio-video encounter. The patient (or guardian if applicable) is aware that this is a billable service. Verbal consent to proceed has been obtained within the past 12 months. The visit was conducted pursuant to the emergency declaration under the [address] [place] [address] authority and the [employer] Act. Patient identification was verified, and a caregiver was present when appropriate. The patient was located in a state where the provider was credentialed to provide care. Total time spent on this encounter: Not billed by time    --KUSUM Bonilla - CNP on 6/14/2021 at 4:50 PM    An electronic signature was used to authenticate this note.

## 2021-06-18 DIAGNOSIS — E03.9 HYPOTHYROIDISM, UNSPECIFIED TYPE: ICD-10-CM

## 2021-06-18 LAB
T4 FREE: 1.4 NG/DL (ref 0.9–1.8)
TSH SERPL DL<=0.05 MIU/L-ACNC: 10.34 UIU/ML (ref 0.27–4.2)

## 2021-06-19 DIAGNOSIS — E03.9 HYPOTHYROIDISM, UNSPECIFIED TYPE: Primary | ICD-10-CM

## 2021-06-25 DIAGNOSIS — F51.01 PRIMARY INSOMNIA: ICD-10-CM

## 2021-06-25 RX ORDER — ZOLPIDEM TARTRATE 5 MG/1
TABLET ORAL
Qty: 30 TABLET | Refills: 0 | Status: SHIPPED | OUTPATIENT
Start: 2021-06-25 | End: 2021-07-26

## 2021-06-25 NOTE — TELEPHONE ENCOUNTER
Last Office Visit  -  06/14/21  Next Office Visit  -  none    Last Filled  -  02/08/21  Last UDS -  05-09-20  Contract -  none

## 2021-07-02 ENCOUNTER — HOSPITAL ENCOUNTER (OUTPATIENT)
Dept: ULTRASOUND IMAGING | Age: 41
Discharge: HOME | End: 2021-07-02
Payer: COMMERCIAL

## 2021-07-02 DIAGNOSIS — E03.9 HYPOTHYROIDISM, UNSPECIFIED TYPE: ICD-10-CM

## 2021-07-02 PROCEDURE — 76536 US EXAM OF HEAD AND NECK: CPT

## 2021-07-06 DIAGNOSIS — F90.9 ATTENTION DEFICIT HYPERACTIVITY DISORDER (ADHD), UNSPECIFIED ADHD TYPE: ICD-10-CM

## 2021-07-06 RX ORDER — DEXTROAMPHETAMINE SACCHARATE, AMPHETAMINE ASPARTATE, DEXTROAMPHETAMINE SULFATE AND AMPHETAMINE SULFATE 5; 5; 5; 5 MG/1; MG/1; MG/1; MG/1
20 TABLET ORAL 2 TIMES DAILY
Qty: 60 TABLET | Refills: 0 | Status: SHIPPED | OUTPATIENT
Start: 2021-07-06 | End: 2021-08-06 | Stop reason: SDUPTHER

## 2021-07-26 DIAGNOSIS — F51.01 PRIMARY INSOMNIA: ICD-10-CM

## 2021-07-26 RX ORDER — ZOLPIDEM TARTRATE 5 MG/1
TABLET ORAL
Qty: 30 TABLET | Refills: 0 | Status: SHIPPED | OUTPATIENT
Start: 2021-07-26 | End: 2021-09-01

## 2021-07-26 NOTE — TELEPHONE ENCOUNTER
Last Office Visit  -  6/14/21  Next Office Visit  -  None     Last Filled  -  6/25/21  Last UDS -  None  Contract -  None
c/o of chest pain since yesterday with shortness of breath, also c/o of left knee pain

## 2021-08-06 DIAGNOSIS — F90.9 ATTENTION DEFICIT HYPERACTIVITY DISORDER (ADHD), UNSPECIFIED ADHD TYPE: ICD-10-CM

## 2021-08-06 RX ORDER — DEXTROAMPHETAMINE SACCHARATE, AMPHETAMINE ASPARTATE, DEXTROAMPHETAMINE SULFATE AND AMPHETAMINE SULFATE 5; 5; 5; 5 MG/1; MG/1; MG/1; MG/1
20 TABLET ORAL 2 TIMES DAILY
Qty: 60 TABLET | Refills: 0 | Status: SHIPPED | OUTPATIENT
Start: 2021-08-06 | End: 2021-09-07 | Stop reason: SDUPTHER

## 2021-08-06 NOTE — TELEPHONE ENCOUNTER
Last Office Visit  -  6/14/21  Next Office Visit  -  n/a    Last Filled  -  7/6/21  Last UDS -  n/a  Contract -  N/a

## 2021-08-23 ENCOUNTER — TELEPHONE (OUTPATIENT)
Dept: ENDOCRINOLOGY | Age: 41
End: 2021-08-23

## 2021-08-26 RX ORDER — LEVOTHYROXINE SODIUM 50 UG/1
TABLET ORAL
Qty: 90 TABLET | Refills: 0 | Status: SHIPPED | OUTPATIENT
Start: 2021-08-26 | End: 2021-09-08 | Stop reason: SDUPTHER

## 2021-08-31 DIAGNOSIS — F51.01 PRIMARY INSOMNIA: ICD-10-CM

## 2021-09-01 RX ORDER — ZOLPIDEM TARTRATE 5 MG/1
TABLET ORAL
Qty: 30 TABLET | Refills: 0 | Status: SHIPPED | OUTPATIENT
Start: 2021-09-01 | End: 2021-10-04

## 2021-09-01 NOTE — TELEPHONE ENCOUNTER
Last Office Visit  -  6/14/21  Next Office Visit  -  None    Last Filled  -  7/26/21  Last UDS -   None  Contract -  None

## 2021-09-07 ENCOUNTER — PATIENT MESSAGE (OUTPATIENT)
Dept: FAMILY MEDICINE CLINIC | Age: 41
End: 2021-09-07

## 2021-09-07 ENCOUNTER — OFFICE VISIT (OUTPATIENT)
Dept: FAMILY MEDICINE CLINIC | Age: 41
End: 2021-09-07
Payer: COMMERCIAL

## 2021-09-07 VITALS
SYSTOLIC BLOOD PRESSURE: 122 MMHG | OXYGEN SATURATION: 96 % | DIASTOLIC BLOOD PRESSURE: 86 MMHG | HEART RATE: 86 BPM | BODY MASS INDEX: 36 KG/M2 | WEIGHT: 288 LBS

## 2021-09-07 DIAGNOSIS — F90.9 ATTENTION DEFICIT HYPERACTIVITY DISORDER (ADHD), UNSPECIFIED ADHD TYPE: ICD-10-CM

## 2021-09-07 DIAGNOSIS — E03.9 HYPOTHYROIDISM, UNSPECIFIED TYPE: Primary | ICD-10-CM

## 2021-09-07 PROCEDURE — 99213 OFFICE O/P EST LOW 20 MIN: CPT | Performed by: NURSE PRACTITIONER

## 2021-09-07 PROCEDURE — G8427 DOCREV CUR MEDS BY ELIG CLIN: HCPCS | Performed by: NURSE PRACTITIONER

## 2021-09-07 PROCEDURE — 1036F TOBACCO NON-USER: CPT | Performed by: NURSE PRACTITIONER

## 2021-09-07 PROCEDURE — G8417 CALC BMI ABV UP PARAM F/U: HCPCS | Performed by: NURSE PRACTITIONER

## 2021-09-07 PROCEDURE — 36415 COLL VENOUS BLD VENIPUNCTURE: CPT | Performed by: NURSE PRACTITIONER

## 2021-09-07 RX ORDER — DEXTROAMPHETAMINE SACCHARATE, AMPHETAMINE ASPARTATE, DEXTROAMPHETAMINE SULFATE AND AMPHETAMINE SULFATE 7.5; 7.5; 7.5; 7.5 MG/1; MG/1; MG/1; MG/1
30 TABLET ORAL DAILY
Qty: 30 TABLET | Refills: 0 | Status: SHIPPED | OUTPATIENT
Start: 2021-09-07 | End: 2021-10-05 | Stop reason: SDUPTHER

## 2021-09-07 RX ORDER — DEXTROAMPHETAMINE SACCHARATE, AMPHETAMINE ASPARTATE, DEXTROAMPHETAMINE SULFATE AND AMPHETAMINE SULFATE 5; 5; 5; 5 MG/1; MG/1; MG/1; MG/1
20 TABLET ORAL DAILY
Qty: 60 TABLET | Refills: 0 | Status: SHIPPED | OUTPATIENT
Start: 2021-09-07 | End: 2021-09-08 | Stop reason: SDUPTHER

## 2021-09-07 ASSESSMENT — ENCOUNTER SYMPTOMS
SHORTNESS OF BREATH: 0
WHEEZING: 0

## 2021-09-07 NOTE — TELEPHONE ENCOUNTER
From: Gabrielle Anguiano.   To: Mckenna Slider, APRN - CNP  Sent: 9/7/2021 5:04 PM EDT  Subject: Non-Urgent Medical Question    Covid Vaccine Card

## 2021-09-07 NOTE — LETTER
CONTROLLED SUBSTANCE MEDICATION AGREEMENT   Ambien and Adderall      Patient Name: Bere Uribe. Patient YOB: 1980   I understand, that controlled substance medications may be used to help better manage my symptoms and to improve my ability to function at home, work and in social settings. However, I also understand that these medications do have risks, which have been discussed with me, including possible development of physical or psychological dependence. I understand that successful treatment requires mutual trust and honesty between me and my provider. I understand and agree that following this Medication Agreement is necessary in continuing my provider-patient relationship and the success of my treatment plan. Explanation from my Provider: Benefits and Goals of Controlled Substance Medications: There are two potential goals for your treatment: (1) decreased pain and suffering (2) improved daily life functions. There are many possible treatments for your chronic condition(s). Alternatives such as physical therapy, yoga, massage, home daily exercise, meditation, relaxation techniques, injections, chiropractic manipulations, surgery, cognitive therapy, hypnosis and many medications that are not habit-forming may be used. Use of controlled substance medications may be helpful, but they are unlikely to resolve all symptoms or restore all function. Explanation from my Provider: Risks of Controlled Substance Medications:  Opioid pain medications: These medications can lead to problems such as addiction/dependence, sedation, lightheadedness/dizziness, memory issues, falls, constipation, nausea, or vomiting. They may also impair the ability to drive or operate machinery. Additionally, these medications may lower testosterone levels, leading to loss of bone strength, stamina and sex drive.   They may cause problems with breathing, sleep apnea and reduced coughing, which is especially dangerous for patients with lung disease. Overdose or dangerous interactions with alcohol and other medications may occur, leading to death. Hyperalgesia may develop, which means patients receiving opioids for the treatment of pain may become more sensitive to certain painful stimuli, and in some cases, experience pain from ordinarily non-painful stimuli. Women between the ages of 14-53 who could become pregnant should carefully weigh the risks and benefits of opioids with their physicians, as these medications increase the risk of pregnancy complications, including miscarriage,  delivery and stillbirth. It is also possible for babies to be born addicted to opioids. Opioid dependence withdrawal symptoms may include; feelings of uneasiness, increased pain, irritability, belly pain, diarrhea, sweats and goose-flesh. Benzodiazepines and non-benzodiazepine sleep medications: These medications can lead to problems such as addiction/dependence, sedation, fatigue, lightheadedness, dizziness, incoordination, falls, depression, hallucinations, and impaired judgment, memory and concentration. The ability to drive and operate machinery may also be affected. Abnormal sleep-related behaviors have been reported, including sleepwalking, driving, making telephone calls, eating, or having sex while not fully awake. These medications can suppress breathing and worsen sleep apnea, particularly when combined with alcohol or other sedating medications, potentially leading to death. Dependence withdrawal symptoms may include tremors, anxiety, hallucinations and seizures. Stimulants:  Common adverse effects include addiction/dependence, increased blood  pressure and heart rate, decreased appetite, nausea, involuntary weight loss, insomnia,                                                                                                                     Initials:_______   irritability, and headaches.   These risks may increase when these medications are combined with other stimulants, such as caffeine pills or energy drinks, certain weight loss supplements and oral decongestants. Dependence withdrawal symptoms may include depressed mood, loss of interest, suicidal thoughts, anxiety, fatigue, appetite changes and agitation. Testosterone replacement therapy:  Potential side effects include increased risk of stroke and heart attack, blood clots, increased blood pressure, increased cholesterol, enlarged prostate, sleep apnea, irritability/aggression and other mood disorders, and decreased fertility. I agree and understand that I and my prescriber have the following rights and responsibilities regarding my treatment plan:     1. MY RIGHTS:  To be informed of my treatment and medication plan. To be an active participant in my health and wellbeing. 2. MY RESPONSIBILITY AND UNDERSTANDING FOR USE OF MEDICATIONS   I will take medications at the dose and frequency as directed. For my safety, I will not increase or change how I take my medications without the recommendation of my healthcare provider.  I will actively participate in any program recommended by my provider which may improve function, including social, physical, psychological programs.  I will not take my medications with alcohol or other drugs not prescribed to me. I understand that drinking alcohol with my medications increases the chances of side effects, including reduced breathing rate and could lead to personal injury when operating machinery.  I understand that if I have a history of substance use disorders, including alcohol or other illicit drugs, that I may be at increased risk of addiction to my medications.  I agree to notify my provider immediately if I should become pregnant so that my treatment plan can be adjusted.    I agree and understand that I shall only receive controlled substance medications from the prescriber that signed this agreement unless there is written agreement among other prescribers of controlled substances outlining the responsibility of the medications being prescribed.  I understand that the if the controlled medication is not helping to achieve goals, the dosage may be tapered and no longer prescribed. 3. MY RESPONSIBILITY FOR COMMUNICATION / PRESCRIPTION RENEWALS   I agree that all controlled substance medications that I take will be prescribed only by my provider. If another healthcare provider prescribes me medication in an emergency, I will notify my provider within seventy-two (72) hours.  I will arrange for refills at the prescribed interval ONLY during regular office hours. I will not ask for refills earlier than agreed, after-hours, on holidays or weekends. Refills may take up to 72 hours for processing and prescriptions to reach the pharmacy.  I will inform my other health care providers that I am taking these medications and of the existence of this [id number]. In the event of an emergency, I will provide the same information to the emergency department prescribers.  I will keep my provider updated on the pharmacy I am using for controlled medication prescription filling. Initials:_______  4. MY RESPONSIBILITY FOR PROTECTING MEDICATIONS   I will protect my prescriptions and medications. I understand that lost or misplaced prescriptions will not be replaced.  I will keep medications only for my own use and will not share them with others. I will keep all medications away from children.  I agree that if my medications are adjusted or discontinued, I will properly dispose of any remaining medications. I understand that I will be required to dispose of any remaining controlled medications as, directed by my prescriber, prior to being provided with any prescriptions for other controlled medications.   Medication drop box locations can be found at: [email or web address]    5. MY RESPONSIBILITY WITH ILLEGAL DRUGS    I will not use illegal or street drugs or another person's prescription medications not prescribed to me.  If there are identified addiction type symptoms, then referral to a program may be provided by my provider and I agree to follow through with this recommendation. 6. MY RESPONSIBILITY FOR COOPERATION WITH INVESTIGATIONS   I understand that my provider will comply with any applicable law and may discuss my use and/or possible misuse/abuse of controlled substances and alcohol, as appropriate, with any health care provider involved in my care, pharmacist, or legal authority.  I authorize my provider and pharmacy to cooperate fully with law enforcement agencies (as permitted by law) in the investigation of any possible misuse, sale, or other diversion of my controlled substances.  I agree to waive any applicable privilege or right of privacy or confidentiality with respect to these authorizations. 7. PROVIDERS RIGHT TO MONITOR FOR SAFETY: PRESCRIPTION MONITORING / DRUG TESTING   I consent to drug/toxicology screening and will submit to a drug screen upon my providers request to assure I am only taking the prescribed drugs for my safety monitoring. I understand that a drug screen is a laboratory test in which a sample of my urine, blood or saliva is checked to see what drugs I have been taking. This may entail an observed urine specimen, which means that a nurse or other health care provider may watch me provide urine, and I will cooperate if I am asked to provide an observed specimen.  I understand that my provider will check a copy of my State Prescription Monitoring Program () Report in order to safely prescribe medications.  Pill Counts: I consent to pill counts when requested.   I may be asked to bring all my prescribed controlled substance medications, in their original bottles, to all of my scheduled appointments. In addition, my provider may ask me to come to the practice at any time for a random pill count. 8. TERMINATION OF THIS AGREEMENT  For my safety, my prescriber has the right to stop prescribing controlled substance medications and may end this agreement. Initials:_______   Conditions that may result in termination of this agreement:  a. I do not show any improvement in pain, or my activity has not improved. b. I develop rapid tolerance or loss of improvement, as described in my treatment plan.  c. I develop significant side effects from the medication. d. My behavior is not consistent with the responsibilities outlined above, thereby causing safety concerns to continue prescribing controlled substance medications. e. I fail to follow the terms of this agreement. f. Other:____________________________       UNDERSTANDING THIS MEDICATION AGREEMENT:    I have read the above and have had all my questions answered. For chronic disease management, I know that my symptoms can be managed with many types of treatments. A chronic medication trial may be part of my treatment, but I must be an active participant in my care. Medication therapy is only one part of my symptom management plan. In some cases, there may be limited scientific evidence to support the chronic use of certain medications to improve symptoms and daily function. Furthermore, in certain circumstances, there may be scientific information that suggests that the use of chronic controlled substances may worsen my symptoms and increase my risk of unintentional death directly related to this medication therapy. I know that if my provider feels my risk from controlled medications is greater than my benefit, I will have my controlled substance medication(s) compassionately lowered or removed altogether.      I further agree to allow this office to contact my HIPAA contact if there are concerns about my safety and use of the controlled medications. I have agreed to use the prescribed controlled substance medications to me as instructed by my provider and as stated in this Medication Agreement. My initial on each page and my signature below shows that I have read each page and I have had the opportunity to ask questions with answers provided by my provider.     Patient Name (Printed): _____________________________________  Patient Signature:  ______________________   Date: _____________    Prescriber Name (Printed): ___________________________________  Prescriber Signature: _____________________  Date: _____________

## 2021-09-08 ENCOUNTER — TELEPHONE (OUTPATIENT)
Dept: FAMILY MEDICINE CLINIC | Age: 41
End: 2021-09-08

## 2021-09-08 DIAGNOSIS — F90.9 ATTENTION DEFICIT HYPERACTIVITY DISORDER (ADHD), UNSPECIFIED ADHD TYPE: ICD-10-CM

## 2021-09-08 DIAGNOSIS — E03.9 HYPOTHYROIDISM, UNSPECIFIED TYPE: Primary | ICD-10-CM

## 2021-09-08 LAB
T4 FREE: 1.5 NG/DL (ref 0.9–1.8)
TSH SERPL DL<=0.05 MIU/L-ACNC: 6.76 UIU/ML (ref 0.27–4.2)

## 2021-09-08 RX ORDER — DEXTROAMPHETAMINE SACCHARATE, AMPHETAMINE ASPARTATE, DEXTROAMPHETAMINE SULFATE AND AMPHETAMINE SULFATE 5; 5; 5; 5 MG/1; MG/1; MG/1; MG/1
20 TABLET ORAL DAILY
Qty: 30 TABLET | Refills: 0 | Status: SHIPPED | OUTPATIENT
Start: 2021-09-08 | End: 2021-10-11 | Stop reason: SDUPTHER

## 2021-09-08 RX ORDER — LEVOTHYROXINE SODIUM 0.07 MG/1
TABLET ORAL
Qty: 90 TABLET | Refills: 3 | Status: SHIPPED | OUTPATIENT
Start: 2021-09-08 | End: 2021-12-14

## 2021-09-08 NOTE — TELEPHONE ENCOUNTER
Pharmacist called and stated that this patients amphetamine-dextroamphetamine (ADDERALL) 20 MG tablet there is a quantity error. Please review  Quantity and directions ad send new prescription.

## 2021-09-12 LAB
6-ACETYLMORPHINE: NOT DETECTED
7-AMINOCLONAZEPAM: NOT DETECTED
ALPHA-OH-ALPRAZOLAM: NOT DETECTED
ALPHA-OH-MIDAZOLAM, URINE: NOT DETECTED
ALPRAZOLAM: NOT DETECTED
AMPHETAMINE: PRESENT
BARBITURATES: NOT DETECTED
BENZOYLECGONINE: NOT DETECTED
BUPRENORPHINE: NOT DETECTED
CARISOPRODOL: NOT DETECTED
CLONAZEPAM: NOT DETECTED
CODEINE: NOT DETECTED
CREATININE URINE: 221.8 MG/DL (ref 20–400)
DIAZEPAM: NOT DETECTED
DRUGS EXPECTED: NORMAL
EER PAIN MGT DRUG PANEL, HIGH RES/EMIT U: NORMAL
ETHYL GLUCURONIDE: NOT DETECTED
FENTANYL: NOT DETECTED
GABAPENTIN: NOT DETECTED
HYDROCODONE: NOT DETECTED
HYDROMORPHONE: NOT DETECTED
LORAZEPAM: NOT DETECTED
MARIJUANA METABOLITE: NOT DETECTED
MDA: NOT DETECTED
MDEA: NOT DETECTED
MDMA URINE: NOT DETECTED
MEPERIDINE: NOT DETECTED
METHADONE: NOT DETECTED
METHAMPHETAMINE: NOT DETECTED
METHYLPHENIDATE: NOT DETECTED
MIDAZOLAM: NOT DETECTED
MORPHINE: NOT DETECTED
NALOXONE: NOT DETECTED
NORBUPRENORPHINE, FREE: NOT DETECTED
NORDIAZEPAM: NOT DETECTED
NORFENTANYL: NOT DETECTED
NORHYDROCODONE, URINE: NOT DETECTED
NOROXYCODONE: NOT DETECTED
NOROXYMORPHONE, URINE: NOT DETECTED
OXAZEPAM: NOT DETECTED
OXYCODONE: NOT DETECTED
OXYMORPHONE: NOT DETECTED
PAIN MANAGEMENT DRUG PANEL: NORMAL
PAIN MANAGEMENT DRUG PANEL: NORMAL
PCP: NOT DETECTED
PHENTERMINE: NOT DETECTED
PREGABALIN: NOT DETECTED
TAPENTADOL, URINE: NOT DETECTED
TAPENTADOL-O-SULFATE, URINE: NOT DETECTED
TEMAZEPAM: NOT DETECTED
TRAMADOL: NOT DETECTED
ZOLPIDEM: NOT DETECTED

## 2021-09-23 DIAGNOSIS — E03.9 HYPOTHYROIDISM, UNSPECIFIED TYPE: ICD-10-CM

## 2021-09-23 RX ORDER — LEVOTHYROXINE SODIUM 0.2 MG/1
TABLET ORAL
Qty: 90 TABLET | Refills: 0 | Status: SHIPPED | OUTPATIENT
Start: 2021-09-23 | End: 2021-12-14

## 2021-10-03 DIAGNOSIS — F51.01 PRIMARY INSOMNIA: ICD-10-CM

## 2021-10-04 RX ORDER — ZOLPIDEM TARTRATE 5 MG/1
TABLET ORAL
Qty: 30 TABLET | Refills: 0 | Status: SHIPPED | OUTPATIENT
Start: 2021-10-04 | End: 2021-11-08

## 2021-10-04 NOTE — TELEPHONE ENCOUNTER
Last Office Visit  -  9/7/21  Next Office Visit  -  None    Last Filled  -    Last UDS -    Contract -

## 2021-10-05 DIAGNOSIS — F51.01 PRIMARY INSOMNIA: ICD-10-CM

## 2021-10-05 RX ORDER — ZOLPIDEM TARTRATE 5 MG/1
TABLET ORAL
Qty: 30 TABLET | Refills: 0 | Status: CANCELLED | OUTPATIENT
Start: 2021-10-05 | End: 2021-11-04

## 2021-10-11 DIAGNOSIS — F90.9 ATTENTION DEFICIT HYPERACTIVITY DISORDER (ADHD), UNSPECIFIED ADHD TYPE: ICD-10-CM

## 2021-10-13 RX ORDER — DEXTROAMPHETAMINE SACCHARATE, AMPHETAMINE ASPARTATE, DEXTROAMPHETAMINE SULFATE AND AMPHETAMINE SULFATE 5; 5; 5; 5 MG/1; MG/1; MG/1; MG/1
20 TABLET ORAL DAILY
Qty: 30 TABLET | Refills: 0 | Status: SHIPPED | OUTPATIENT
Start: 2021-10-13 | End: 2021-11-09 | Stop reason: DRUGHIGH

## 2021-11-06 DIAGNOSIS — F51.01 PRIMARY INSOMNIA: ICD-10-CM

## 2021-11-08 RX ORDER — ZOLPIDEM TARTRATE 5 MG/1
TABLET ORAL
Qty: 30 TABLET | Refills: 0 | Status: SHIPPED | OUTPATIENT
Start: 2021-11-08 | End: 2021-12-07 | Stop reason: SDUPTHER

## 2021-11-09 DIAGNOSIS — F90.9 ATTENTION DEFICIT HYPERACTIVITY DISORDER (ADHD), UNSPECIFIED ADHD TYPE: ICD-10-CM

## 2021-11-09 RX ORDER — DEXTROAMPHETAMINE SACCHARATE, AMPHETAMINE ASPARTATE, DEXTROAMPHETAMINE SULFATE AND AMPHETAMINE SULFATE 7.5; 7.5; 7.5; 7.5 MG/1; MG/1; MG/1; MG/1
30 TABLET ORAL 2 TIMES DAILY
Qty: 60 TABLET | Refills: 0 | Status: SHIPPED | OUTPATIENT
Start: 2021-11-09 | End: 2021-12-13 | Stop reason: SDUPTHER

## 2021-12-13 ENCOUNTER — OFFICE VISIT (OUTPATIENT)
Dept: ENDOCRINOLOGY | Age: 41
End: 2021-12-13
Payer: COMMERCIAL

## 2021-12-13 VITALS
HEART RATE: 95 BPM | OXYGEN SATURATION: 96 % | SYSTOLIC BLOOD PRESSURE: 124 MMHG | WEIGHT: 294 LBS | BODY MASS INDEX: 36.56 KG/M2 | HEIGHT: 75 IN | DIASTOLIC BLOOD PRESSURE: 82 MMHG

## 2021-12-13 DIAGNOSIS — E03.9 ACQUIRED HYPOTHYROIDISM: ICD-10-CM

## 2021-12-13 DIAGNOSIS — E03.9 ACQUIRED HYPOTHYROIDISM: Primary | ICD-10-CM

## 2021-12-13 DIAGNOSIS — F90.9 ATTENTION DEFICIT HYPERACTIVITY DISORDER (ADHD), UNSPECIFIED ADHD TYPE: ICD-10-CM

## 2021-12-13 LAB
REASON FOR REJECTION: NORMAL
REJECTED TEST: NORMAL
T3 FREE: 3.3 PG/ML (ref 2.3–4.2)
T4 FREE: 1.4 NG/DL (ref 0.9–1.8)
TSH REFLEX: 5.94 UIU/ML (ref 0.27–4.2)

## 2021-12-13 PROCEDURE — G8417 CALC BMI ABV UP PARAM F/U: HCPCS | Performed by: INTERNAL MEDICINE

## 2021-12-13 PROCEDURE — G8427 DOCREV CUR MEDS BY ELIG CLIN: HCPCS | Performed by: INTERNAL MEDICINE

## 2021-12-13 PROCEDURE — G8484 FLU IMMUNIZE NO ADMIN: HCPCS | Performed by: INTERNAL MEDICINE

## 2021-12-13 PROCEDURE — 99203 OFFICE O/P NEW LOW 30 MIN: CPT | Performed by: INTERNAL MEDICINE

## 2021-12-13 RX ORDER — DEXTROAMPHETAMINE SACCHARATE, AMPHETAMINE ASPARTATE, DEXTROAMPHETAMINE SULFATE AND AMPHETAMINE SULFATE 7.5; 7.5; 7.5; 7.5 MG/1; MG/1; MG/1; MG/1
30 TABLET ORAL 2 TIMES DAILY
Qty: 60 TABLET | Refills: 0 | Status: SHIPPED | OUTPATIENT
Start: 2021-12-13 | End: 2022-01-23 | Stop reason: SDUPTHER

## 2021-12-13 RX ORDER — UBROGEPANT 100 MG/1
TABLET ORAL
COMMUNITY
Start: 2021-11-18

## 2021-12-13 NOTE — PROGRESS NOTES
Subjective:      41 y/o WM who is here for thyroid evaluation. Referred by Aime Hernandez    He was diagnosed with hypothyroidism years ago    Dx with Hashimoto's    H/o left lobectomy    Has been on different dosages    Currently taking levothyroxine 200+75  Dose changed on 9/21    Takes early in the morning, on empty stomach    9/21 TSH 6.76  FT4 1.5  6/21 TSH 10.34 FT4 1.4  2/21 TSH 6.11  8/20 TSH 28.06  4/19   Off medication  4/18 TSH 12.42  10/17 TSH 0.42    Current complaints:see HPI  fatigue    History of radiation to patient's neck:  no  Family history includes thyroid issue  Family history of thyroid cancer: older sister thyroid cancer    7/21 USG    FINDINGS:   Right thyroid lobe:  6.7 x 3.3 x 2.7 cm       Left thyroid lobe:  Not measured       Isthmus:  9 mm       Thyroid Gland:  Status post left thyroidectomy.  The right thyroid gland is   heterogeneously enlarged       Nodules: No thyroid nodules are present.       Cervical lymphadenopathy: No abnormal lymph nodes in the imaged portions of   the neck. He has a h/o gastric sleeve    Mild. 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[place]    Past Medical History:   Diagnosis Date    Depression     Hemiplegic migraine 05/2020    Irritable     Nephrolith 5/2010    Sleep apnea     uses c-pap    Thyroid disease     hypothyroid - chronic lymphocytic thyroiditis     Past Surgical History:   Procedure Laterality Date    HERNIA REPAIR  4/5/16    ventral    [place] LAP, IGNACIO RESTRICT PROC, LONGITUDINAL GASTRECTOMY N/A 10/8/2018    LAPAROSCOPIC SLEEVE GASTRECTOMY-ETHICON performed by Suhas Owens MD at [address] [place] [address], PARTIAL  3/15    UPPER GASTROINTESTINAL ENDOSCOPY  05/11/2018    VASECTOMY N/A 2012    WISDOM TOOTH EXTRACTION       Current Outpatient Medications   Medication Sig Dispense Refill    amphetamine-dextroamphetamine (ADDERALL, 30MG,) 30 MG tablet Take 1 tablet by mouth 2 times daily for 30 days.  61 tablet 0    UBRELVY 100 MG TABS       zolpidem (AMBIEN) 5 MG tablet Take 1 tablet by mouth nightly as needed for Sleep for up to 30 days. 30 tablet 0    levothyroxine (SYNTHROID) 200 MCG tablet Take 1 tablet by mouth once daily 90 tablet 0    levothyroxine (EUTHYROX) 75 MCG tablet TAKE 1 TABLET BY MOUTH ONCE DAILY (ADD  TO  200MCG  DOSE) 90 tablet 3    busPIRone (BUSPAR) 10 MG tablet TAKE 1 TABLET BY MOUTH THREE TIMES DAILY AS NEEDED FOR ANXIETY 270 tablet 1    buPROPion (WELLBUTRIN SR) 200 MG extended release tablet Take 1 tablet by mouth twice daily 60 tablet 11    Multiple Vitamins-Minerals (THERAPEUTIC MULTIVITAMIN-MINERALS) tablet Take 1 tablet by mouth daily       No current facility-administered medications for this visit. Review of Systems  Please see scanned     Objective: There were no vitals taken for this visit. Wt Readings from Last 3 Encounters:   09/07/21 288 lb (130.6 kg)   03/31/21 297 lb (134.7 kg)   01/27/21 297 lb (134.7 kg)     Vitals:    12/13/21 1307   BP: 124/82   Pulse: 95   SpO2: 96%       Constitutional: Well-developed, appears stated age, cooperative, in no acute distress  H/E/N/M/T:atraumatic, normocephalic, external ears, nose, lips normal without lesions  No facial puffiness, no hoarseness    Eyes: Lids, lashes, conjunctivae and sclerae normal, No proptosis, no lid lag, no stare, no periorbital edema, extraocular movements intact. Neck: supple, symmetrical, trachea midline   Thyroid: gland size normal, non-tender on palpation, scar xuan  Respiratory: clear to auscultation bilaterally and breathing is unlabored  Cardiovascular: regular rate and rhythm, S1, S2, regular rate and rhythm, no murmur, rub or gallop. Skin: No obvious rashes or lesions present.   Skin and hair texture normal  Psychiatric: Judgement and Insight:  judgement and insight appear normal  Neuro: Normal without focal findings, speech is normal normal, speech is spontaneous, and movements are coordinated, alert and oriented x3    Lab Review  Lab Results   Component Value Date    TSH 6.76 09/07/2021     No results found for: FREET4      Assessment: 1. Hypothyroidism: For years, not controlled. TSH has been fluctuating. Discussed possible causes. Will check HAMA. Evaluate T3 level. May have some THR as TSH/T4 have been discordant sometimes. Repeat level today. Adjust dose based on labs. Plan: 1. Order TFT  2. HAMA  3. Add cytomel if needed  4. May switch to Brand Synthroid

## 2021-12-13 NOTE — TELEPHONE ENCOUNTER
Last Office Visit  -  9/7/21  Next Office Visit  -  n/a    Last Filled  -  11/9/21  Last UDS -  9/7/21  Contract -  9/7/21

## 2021-12-14 ENCOUNTER — TELEPHONE (OUTPATIENT)
Dept: ENDOCRINOLOGY | Age: 41
End: 2021-12-14

## 2021-12-14 DIAGNOSIS — E03.9 ACQUIRED HYPOTHYROIDISM: Primary | ICD-10-CM

## 2021-12-14 RX ORDER — LEVOTHYROXINE SODIUM 300 MCG
300 TABLET ORAL DAILY
Qty: 30 TABLET | Refills: 3 | Status: SHIPPED | OUTPATIENT
Start: 2021-12-14 | End: 2022-04-15

## 2021-12-14 NOTE — TELEPHONE ENCOUNTER
[facility] called and rejected the lab done yesterday they need a new order in the chart to be performed.

## 2021-12-21 DIAGNOSIS — E03.9 ACQUIRED HYPOTHYROIDISM: ICD-10-CM

## 2021-12-30 ENCOUNTER — TELEPHONE (OUTPATIENT)
Dept: FAMILY MEDICINE CLINIC | Age: 41
End: 2021-12-30

## 2021-12-30 NOTE — TELEPHONE ENCOUNTER
Received call from patient's wife stating that Pt has symptoms of low grade fever, chills, body aches, no congestion. Pt was advised to funmi E-Visit.

## 2022-01-06 LAB — MISCELLANEOUS LAB TEST ORDER: NORMAL

## 2022-01-11 DIAGNOSIS — F51.01 PRIMARY INSOMNIA: ICD-10-CM

## 2022-01-12 RX ORDER — ZOLPIDEM TARTRATE 5 MG/1
5 TABLET ORAL NIGHTLY PRN
Qty: 30 TABLET | Refills: 0 | Status: SHIPPED | OUTPATIENT
Start: 2022-01-12 | End: 2022-02-11

## 2022-01-12 NOTE — TELEPHONE ENCOUNTER
Last Office Visit  -  9/7/21  Next Office Visit  -  n/a    Last Filled  -  12/8/21  Last UDS -  9/7/21  Contract -  9/9/21

## 2022-01-23 DIAGNOSIS — F90.9 ATTENTION DEFICIT HYPERACTIVITY DISORDER (ADHD), UNSPECIFIED ADHD TYPE: ICD-10-CM

## 2022-01-24 RX ORDER — DEXTROAMPHETAMINE SACCHARATE, AMPHETAMINE ASPARTATE, DEXTROAMPHETAMINE SULFATE AND AMPHETAMINE SULFATE 7.5; 7.5; 7.5; 7.5 MG/1; MG/1; MG/1; MG/1
30 TABLET ORAL 2 TIMES DAILY
Qty: 60 TABLET | Refills: 0 | Status: SHIPPED | OUTPATIENT
Start: 2022-01-24 | End: 2022-02-14 | Stop reason: SDUPTHER

## 2022-01-24 NOTE — TELEPHONE ENCOUNTER
Last Office Visit  -  6/14/21  Next Office Visit  -  2/3/22    Last Filled  -  12/13/21  Last UDS -    Contract -

## 2022-02-14 ENCOUNTER — OFFICE VISIT (OUTPATIENT)
Dept: FAMILY MEDICINE CLINIC | Age: 42
End: 2022-02-14
Payer: COMMERCIAL

## 2022-02-14 VITALS
OXYGEN SATURATION: 97 % | WEIGHT: 292 LBS | SYSTOLIC BLOOD PRESSURE: 130 MMHG | DIASTOLIC BLOOD PRESSURE: 88 MMHG | HEART RATE: 102 BPM | BODY MASS INDEX: 36.5 KG/M2

## 2022-02-14 DIAGNOSIS — Z13.1 SCREENING FOR DIABETES MELLITUS: ICD-10-CM

## 2022-02-14 DIAGNOSIS — Z83.2 FAMILY HISTORY OF VON WILLEBRAND DISEASE: ICD-10-CM

## 2022-02-14 DIAGNOSIS — Z13.220 SCREENING FOR HYPERLIPIDEMIA: ICD-10-CM

## 2022-02-14 DIAGNOSIS — D69.9 BLEEDS EASILY (HCC): ICD-10-CM

## 2022-02-14 DIAGNOSIS — Z00.00 PHYSICAL EXAM: ICD-10-CM

## 2022-02-14 DIAGNOSIS — F90.9 ATTENTION DEFICIT HYPERACTIVITY DISORDER (ADHD), UNSPECIFIED ADHD TYPE: ICD-10-CM

## 2022-02-14 DIAGNOSIS — Z00.00 PHYSICAL EXAM: Primary | ICD-10-CM

## 2022-02-14 LAB
A/G RATIO: 1.6 (ref 1.1–2.2)
ALBUMIN SERPL-MCNC: 4.2 G/DL (ref 3.4–5)
ALP BLD-CCNC: 51 U/L (ref 40–129)
ALT SERPL-CCNC: 16 U/L (ref 10–40)
ANION GAP SERPL CALCULATED.3IONS-SCNC: 14 MMOL/L (ref 3–16)
AST SERPL-CCNC: 19 U/L (ref 15–37)
BASOPHILS ABSOLUTE: 0 K/UL (ref 0–0.2)
BASOPHILS RELATIVE PERCENT: 0.6 %
BILIRUB SERPL-MCNC: 0.5 MG/DL (ref 0–1)
BUN BLDV-MCNC: 9 MG/DL (ref 7–20)
CALCIUM SERPL-MCNC: 9 MG/DL (ref 8.3–10.6)
CHLORIDE BLD-SCNC: 105 MMOL/L (ref 99–110)
CHOLESTEROL, TOTAL: 185 MG/DL (ref 0–199)
CO2: 23 MMOL/L (ref 21–32)
CREAT SERPL-MCNC: 0.8 MG/DL (ref 0.9–1.3)
EOSINOPHILS ABSOLUTE: 0.1 K/UL (ref 0–0.6)
EOSINOPHILS RELATIVE PERCENT: 3 %
GFR AFRICAN AMERICAN: >60
GFR NON-AFRICAN AMERICAN: >60
GLUCOSE BLD-MCNC: 99 MG/DL (ref 70–99)
HCT VFR BLD CALC: 43.1 % (ref 40.5–52.5)
HDLC SERPL-MCNC: 38 MG/DL (ref 40–60)
HEMOGLOBIN: 14.6 G/DL (ref 13.5–17.5)
LDL CHOLESTEROL CALCULATED: 98 MG/DL
LYMPHOCYTES ABSOLUTE: 2.2 K/UL (ref 1–5.1)
LYMPHOCYTES RELATIVE PERCENT: 43.9 %
MCH RBC QN AUTO: 29.7 PG (ref 26–34)
MCHC RBC AUTO-ENTMCNC: 34 G/DL (ref 31–36)
MCV RBC AUTO: 87.5 FL (ref 80–100)
MONOCYTES ABSOLUTE: 0.4 K/UL (ref 0–1.3)
MONOCYTES RELATIVE PERCENT: 8.3 %
NEUTROPHILS ABSOLUTE: 2.2 K/UL (ref 1.7–7.7)
NEUTROPHILS RELATIVE PERCENT: 44.2 %
PDW BLD-RTO: 13.3 % (ref 12.4–15.4)
PLATELET # BLD: 232 K/UL (ref 135–450)
PMV BLD AUTO: 8.8 FL (ref 5–10.5)
POTASSIUM SERPL-SCNC: 4.4 MMOL/L (ref 3.5–5.1)
RBC # BLD: 4.93 M/UL (ref 4.2–5.9)
SODIUM BLD-SCNC: 142 MMOL/L (ref 136–145)
TOTAL PROTEIN: 6.8 G/DL (ref 6.4–8.2)
TRIGL SERPL-MCNC: 247 MG/DL (ref 0–150)
VLDLC SERPL CALC-MCNC: 49 MG/DL
WBC # BLD: 5 K/UL (ref 4–11)

## 2022-02-14 PROCEDURE — 90756 CCIIV4 VACC ABX FREE IM: CPT | Performed by: NURSE PRACTITIONER

## 2022-02-14 PROCEDURE — 99396 PREV VISIT EST AGE 40-64: CPT | Performed by: NURSE PRACTITIONER

## 2022-02-14 PROCEDURE — G8482 FLU IMMUNIZE ORDER/ADMIN: HCPCS | Performed by: NURSE PRACTITIONER

## 2022-02-14 PROCEDURE — 90471 IMMUNIZATION ADMIN: CPT | Performed by: NURSE PRACTITIONER

## 2022-02-14 RX ORDER — DEXTROAMPHETAMINE SACCHARATE, AMPHETAMINE ASPARTATE, DEXTROAMPHETAMINE SULFATE AND AMPHETAMINE SULFATE 7.5; 7.5; 7.5; 7.5 MG/1; MG/1; MG/1; MG/1
30 TABLET ORAL 2 TIMES DAILY
Qty: 60 TABLET | Refills: 0 | Status: SHIPPED | OUTPATIENT
Start: 2022-02-23 | End: 2022-03-01 | Stop reason: SDUPTHER

## 2022-02-14 RX ORDER — ZOLPIDEM TARTRATE 5 MG/1
5 TABLET ORAL NIGHTLY PRN
COMMUNITY
End: 2022-02-21

## 2022-02-14 ASSESSMENT — ENCOUNTER SYMPTOMS
SORE THROAT: 0
SHORTNESS OF BREATH: 0
DIARRHEA: 0
CONSTIPATION: 0
WHEEZING: 0
ABDOMINAL PAIN: 0

## 2022-02-14 ASSESSMENT — PATIENT HEALTH QUESTIONNAIRE - PHQ9
2. FEELING DOWN, DEPRESSED OR HOPELESS: 1
SUM OF ALL RESPONSES TO PHQ QUESTIONS 1-9: 2
SUM OF ALL RESPONSES TO PHQ9 QUESTIONS 1 & 2: 2
1. LITTLE INTEREST OR PLEASURE IN DOING THINGS: 1
SUM OF ALL RESPONSES TO PHQ QUESTIONS 1-9: 2

## 2022-02-14 NOTE — PROGRESS NOTES
Patient ID: Shobha Meraz. is a 42 y.o. male who presents today for a Physical Exam.      HPI  Here for physical exam  Son has [facility] to be checked for this as well- he states when he gets his botox injections the doc there has noticed he has been bleeding easier/longer  Migraines: botox every 3 months- [facility]  ADHD: stable on meds   Depression/Anxiety: stable with meds     Past Medical History:   Diagnosis Date    Depression     Hemiplegic migraine 2020    Irritable     Nephrolith 2010    Sleep apnea     uses c-pap    Thyroid disease     hypothyroid - chronic lymphocytic thyroiditis       Past Surgical History:   Procedure Laterality Date    HERNIA REPAIR  16    ventral    [place] LAP, IGNACIO RESTRICT PROC, LONGITUDINAL GASTRECTOMY N/A 10/8/2018    LAPAROSCOPIC SLEEVE GASTRECTOMY-ETHICON performed by Katherine Mcnamara MD at [address] [place] [address], PARTIAL  3/15    UPPER GASTROINTESTINAL ENDOSCOPY  2018    VASECTOMY N/A     WISDOM TOOTH EXTRACTION         Family History   Problem Relation Age of Onset    Depression Mother     Arthritis Father         RA    Cancer Sister         thyroid    Depression Sister     Cancer Paternal Grandfather         lung    Cancer Paternal Aunt         lung          Social History     Socioeconomic History    Marital status:      Spouse name: None    Number of children: None    Years of education: None    Highest education level: None   Occupational History    None   Tobacco Use    Smoking status: Former Smoker     Packs/day: 0.50     Years: 17.00     Pack years: 8.50     Types: Cigarettes     Quit date: 2018     Years since quittin.1    Smokeless tobacco: Never Used   Vaping Use    Vaping Use: Never used   Substance and Sexual Activity    Alcohol use: Yes     Comment: 1MTH    Drug use: No    Sexual activity: Yes     Partners: Female     Comment: Indira = wife   Other Topics Concern    None   Social History Narrative    None     Social Determinants of Health     Financial Resource Strain:     Difficulty of Paying Living Expenses: Not on file   Food Insecurity:     Worried About Running Out of Food in the Last Year: Not on file    Bud of Food in the Last Year: Not on file   Transportation Needs:     Lack of Transportation (Medical): Not on file    Lack of Transportation (Non-Medical): Not on file   Physical Activity:     Days of Exercise per Week: Not on file    Minutes of Exercise per Session: Not on file   Stress:     Feeling of Stress : Not on file   Social Connections:     Frequency of Communication with Friends and Family: Not on file    Frequency of Social Gatherings with Friends and Family: Not on file    Attends Gnosticist Services: Not on file    Active Member of [address] [place] [address] [employer] or Organizations: Not on file    Attends Club or Organization Meetings: Not on file    Marital Status: Not on file   Intimate Partner Violence:     Fear of Current or Ex-Partner: Not on file    Emotionally Abused: Not on file    Physically Abused: Not on file    Sexually Abused: Not on file   Housing Stability:     Unable to Pay for Housing in the Last Year: Not on file    Number of [place] in the Last Year: Not on file    Unstable Housing in the Last Year: Not on file       Allergies   Allergen Reactions    Other Other (See Comments)     Hazelnuts, throat closes       Current Outpatient Medications   Medication Sig Dispense Refill    zolpidem (AMBIEN) 5 MG tablet Take 5 mg by mouth nightly as needed for Sleep.  [START ON 2/23/2022] amphetamine-dextroamphetamine (ADDERALL, 30MG,) 30 MG tablet Take 1 tablet by mouth 2 times daily for 30 days.  60 tablet 0    buPROPion (WELLBUTRIN SR) 200 MG extended release tablet Take 1 tablet by mouth twice daily 60 tablet 5    SYNTHROID 300 MCG tablet Take 1 tablet by mouth daily 30 tablet 3    UBRELVY 100 MG TABS       busPIRone (BUSPAR) 10 MG tablet TAKE 1 TABLET BY MOUTH THREE TIMES DAILY AS NEEDED FOR ANXIETY 270 tablet 1    Multiple Vitamins-Minerals (THERAPEUTIC MULTIVITAMIN-MINERALS) tablet Take 1 tablet by mouth daily       No current facility-administered medications for this visit. The patient's past medical history, past surgical history, family history, medications, and allergies were all reviewed and updated at appropriate today. Review of Systems   Constitutional: Negative for chills and fever. HENT: Negative for congestion and sore throat. Eyes: Negative for visual disturbance. Wears glasses   Respiratory: Negative for shortness of breath and wheezing. Cardiovascular: Negative for chest pain and palpitations. Gastrointestinal: Negative for abdominal pain, constipation and diarrhea. Genitourinary: Negative. Musculoskeletal: Negative. Skin: Negative. Neurological: Positive for headaches (botox helps). Hematological: Bruises/bleeds easily (easy bleeding when he gets his botox shots ). Psychiatric/Behavioral: Negative for dysphoric mood. The patient is nervous/anxious. Physical Exam  Vitals and nursing note reviewed. Constitutional:       Appearance: Normal appearance. He is well-developed. HENT:      Head: Normocephalic and atraumatic. Right Ear: Tympanic membrane and external ear normal.      Left Ear: Tympanic membrane and external ear normal.      Nose: Nose normal.      Mouth/Throat:      Pharynx: No oropharyngeal exudate or posterior oropharyngeal erythema. Eyes:      Conjunctiva/sclera: Conjunctivae normal.      Comments: Wears glasses   Cardiovascular:      Rate and Rhythm: Normal rate and regular rhythm. Heart sounds: Normal heart sounds. No murmur heard. Pulmonary:      Effort: Pulmonary effort is normal. No respiratory distress. Breath sounds: Normal breath sounds. No wheezing or rales. Abdominal:      General: Bowel sounds are normal. There is no distension. Palpations: Abdomen is soft. Tenderness: There is no abdominal tenderness. There is no rebound. Musculoskeletal:         General: Normal range of motion. Cervical back: Normal range of motion. Lymphadenopathy:      Cervical: No cervical adenopathy. Skin:     General: Skin is warm and dry. Neurological:      General: No focal deficit present. Mental Status: He is alert and oriented to person, place, and time. Deep Tendon Reflexes: Reflexes are normal and symmetric. Psychiatric:         Mood and Affect: Mood normal.         Behavior: Behavior normal.         Thought Content: Thought content normal.         Judgment: Judgment normal.         Assessment:  Encounter Diagnoses   Name Primary?  Bleeds easily ([address]     Family history of von Willebrand disease     Screening for hyperlipidemia     Physical exam Yes    Screening for diabetes mellitus     Attention deficit hyperactivity disorder (ADHD), unspecified ADHD type        Plan:  1. Bleeds easily (HCC)    - CBC Auto Differential; Future  - FACTOR 8 RISTOCETIN COFACTOR; Future  - VON WILLEBRAND ANTIGEN; Future    2. Family history of von Willebrand disease    - CBC Auto Differential; Future  - FACTOR 8 RISTOCETIN COFACTOR; Future  - VON WILLEBRAND ANTIGEN; Future    3. Screening for hyperlipidemia    - Lipid Panel; Future    4. Physical exam    - CBC Auto Differential; Future  - Comprehensive Metabolic Panel; Future    5. Screening for diabetes mellitus    - Hemoglobin A1C; Future            No follow-ups on file.

## 2022-02-15 LAB
ESTIMATED AVERAGE GLUCOSE: 102.5 MG/DL
HBA1C MFR BLD: 5.2 %

## 2022-02-17 LAB
VON WILLEBRAND ACTIVITY RCF: 74 % (ref 51–215)
VON WILLEBRAND AG: 77 % (ref 52–214)

## 2022-02-19 DIAGNOSIS — F51.01 PRIMARY INSOMNIA: Primary | ICD-10-CM

## 2022-02-21 RX ORDER — ZOLPIDEM TARTRATE 5 MG/1
TABLET ORAL
Qty: 30 TABLET | Refills: 0 | Status: SHIPPED | OUTPATIENT
Start: 2022-02-21 | End: 2022-03-25

## 2022-02-21 NOTE — TELEPHONE ENCOUNTER
Med listed in chart as historical from 2/14/22.     Last Office Visit  -  2/14/22  Next Office Visit  -      Last Filled  -  unsure  Last UDS -    Contract -

## 2022-03-01 DIAGNOSIS — F90.9 ATTENTION DEFICIT HYPERACTIVITY DISORDER (ADHD), UNSPECIFIED ADHD TYPE: ICD-10-CM

## 2022-03-02 RX ORDER — DEXTROAMPHETAMINE SACCHARATE, AMPHETAMINE ASPARTATE, DEXTROAMPHETAMINE SULFATE AND AMPHETAMINE SULFATE 7.5; 7.5; 7.5; 7.5 MG/1; MG/1; MG/1; MG/1
30 TABLET ORAL 2 TIMES DAILY
Qty: 60 TABLET | Refills: 0 | Status: SHIPPED | OUTPATIENT
Start: 2022-03-02 | End: 2022-04-03 | Stop reason: SDUPTHER

## 2022-03-02 NOTE — TELEPHONE ENCOUNTER
Last Office Visit  -  2/14/22  Next Office Visit  -  n/a    Last Filled  -  2/23/22  Last UDS -  9/7/21  Contract -  9/9/21

## 2022-03-15 ENCOUNTER — PATIENT MESSAGE (OUTPATIENT)
Dept: ENDOCRINOLOGY | Age: 42
End: 2022-03-15

## 2022-03-15 DIAGNOSIS — E03.9 ACQUIRED HYPOTHYROIDISM: Primary | ICD-10-CM

## 2022-03-16 NOTE — TELEPHONE ENCOUNTER
From: Savannah Denton. To: Dr. Heredia Whitney: 3/15/2022 4:54 PM EDT  Subject: Upcoming appointment on 3/22    I was wanting to know if I need to have any blood work done before my appointment next week and if so, do I need to have an order put in for it? Have a great day!

## 2022-03-25 DIAGNOSIS — F51.01 PRIMARY INSOMNIA: ICD-10-CM

## 2022-03-25 RX ORDER — ZOLPIDEM TARTRATE 5 MG/1
TABLET ORAL
Qty: 30 TABLET | Refills: 0 | Status: SHIPPED | OUTPATIENT
Start: 2022-03-25 | End: 2022-05-02

## 2022-03-25 NOTE — TELEPHONE ENCOUNTER
Last Office Visit  -  2/14/22  Next Office Visit  -  n/a    Last Filled  -  2/21/22  Last UDS -  9/7/21  Contract -  9/7/21

## 2022-04-03 DIAGNOSIS — F90.9 ATTENTION DEFICIT HYPERACTIVITY DISORDER (ADHD), UNSPECIFIED ADHD TYPE: ICD-10-CM

## 2022-04-03 DIAGNOSIS — F41.9 ANXIETY: ICD-10-CM

## 2022-04-04 RX ORDER — DEXTROAMPHETAMINE SACCHARATE, AMPHETAMINE ASPARTATE, DEXTROAMPHETAMINE SULFATE AND AMPHETAMINE SULFATE 7.5; 7.5; 7.5; 7.5 MG/1; MG/1; MG/1; MG/1
30 TABLET ORAL 2 TIMES DAILY
Qty: 60 TABLET | Refills: 0 | Status: SHIPPED | OUTPATIENT
Start: 2022-04-04 | End: 2022-05-23 | Stop reason: SDUPTHER

## 2022-04-04 RX ORDER — BUSPIRONE HYDROCHLORIDE 10 MG/1
TABLET ORAL
Qty: 270 TABLET | Refills: 0 | Status: SHIPPED | OUTPATIENT
Start: 2022-04-04 | End: 2022-07-25

## 2022-04-15 RX ORDER — LEVOTHYROXINE SODIUM 300 MCG
TABLET ORAL
Qty: 30 TABLET | Refills: 0 | Status: SHIPPED | OUTPATIENT
Start: 2022-04-15 | End: 2022-05-18

## 2022-04-15 NOTE — TELEPHONE ENCOUNTER
Medication:   Requested Prescriptions     Pending Prescriptions Disp Refills    SYNTHROID 300 MCG tablet [Pharmacy Med Name: Synthroid 300 MCG Oral Tablet] 30 tablet 0     Sig: Take 1 tablet by mouth once daily       Last Filled:      Patient Phone Number: [phone] (home)     Last appt: 12/13/2021   Next appt: 06/27/2022    Last Thyroid:   Lab Results   Component Value Date    TSH 6.76 09/07/2021    FT3 3.3 12/13/2021    T4FREE 1.4 12/13/2021    A5SXTNE 8.7 10/11/2013

## 2022-05-01 DIAGNOSIS — F51.01 PRIMARY INSOMNIA: ICD-10-CM

## 2022-05-02 RX ORDER — ZOLPIDEM TARTRATE 5 MG/1
TABLET ORAL
Qty: 30 TABLET | Refills: 0 | Status: SHIPPED | OUTPATIENT
Start: 2022-05-02 | End: 2022-06-02

## 2022-05-18 RX ORDER — LEVOTHYROXINE SODIUM 300 MCG
TABLET ORAL
Qty: 30 TABLET | Refills: 3 | Status: SHIPPED | OUTPATIENT
Start: 2022-05-18 | End: 2022-06-27 | Stop reason: SDUPTHER

## 2022-05-18 NOTE — TELEPHONE ENCOUNTER
Medication:   Requested Prescriptions     Pending Prescriptions Disp Refills    SYNTHROID 300 MCG tablet [Pharmacy Med Name: Synthroid 300 MCG Oral Tablet] 30 tablet 0     Sig: Take 1 tablet by mouth once daily       Last Filled:      Patient Phone Number: [phone] (home)     Last appt: 12/13/2021   Next appt: 06/27/2022  Last Thyroid:   Lab Results   Component Value Date    TSH 6.76 09/07/2021    FT3 3.3 12/13/2021    T4FREE 1.4 12/13/2021    B3BJZVG 8.7 10/11/2013

## 2022-05-23 DIAGNOSIS — F90.9 ATTENTION DEFICIT HYPERACTIVITY DISORDER (ADHD), UNSPECIFIED ADHD TYPE: ICD-10-CM

## 2022-05-24 RX ORDER — DEXTROAMPHETAMINE SACCHARATE, AMPHETAMINE ASPARTATE, DEXTROAMPHETAMINE SULFATE AND AMPHETAMINE SULFATE 7.5; 7.5; 7.5; 7.5 MG/1; MG/1; MG/1; MG/1
30 TABLET ORAL 2 TIMES DAILY
Qty: 60 TABLET | Refills: 0 | Status: SHIPPED | OUTPATIENT
Start: 2022-05-24 | End: 2022-06-24 | Stop reason: SDUPTHER

## 2022-05-24 NOTE — TELEPHONE ENCOUNTER
Last Office Visit  -  2/14/22  Next Office Visit  -      Last Filled  -  4/4/22  Last UDS -    Contract -      Sent pt message back that appt is needed before next refill

## 2022-05-26 ENCOUNTER — OFFICE VISIT (OUTPATIENT)
Dept: FAMILY MEDICINE CLINIC | Age: 42
End: 2022-05-26
Payer: COMMERCIAL

## 2022-05-26 VITALS
SYSTOLIC BLOOD PRESSURE: 122 MMHG | HEART RATE: 82 BPM | OXYGEN SATURATION: 98 % | DIASTOLIC BLOOD PRESSURE: 86 MMHG | WEIGHT: 289 LBS | BODY MASS INDEX: 36.12 KG/M2

## 2022-05-26 DIAGNOSIS — G47.00 INSOMNIA, UNSPECIFIED TYPE: ICD-10-CM

## 2022-05-26 DIAGNOSIS — F90.9 ATTENTION DEFICIT HYPERACTIVITY DISORDER (ADHD), UNSPECIFIED ADHD TYPE: Primary | ICD-10-CM

## 2022-05-26 PROCEDURE — G8417 CALC BMI ABV UP PARAM F/U: HCPCS | Performed by: NURSE PRACTITIONER

## 2022-05-26 PROCEDURE — 99213 OFFICE O/P EST LOW 20 MIN: CPT | Performed by: NURSE PRACTITIONER

## 2022-05-26 PROCEDURE — G8427 DOCREV CUR MEDS BY ELIG CLIN: HCPCS | Performed by: NURSE PRACTITIONER

## 2022-05-26 PROCEDURE — 1036F TOBACCO NON-USER: CPT | Performed by: NURSE PRACTITIONER

## 2022-05-26 ASSESSMENT — ENCOUNTER SYMPTOMS
WHEEZING: 0
SHORTNESS OF BREATH: 0

## 2022-05-26 NOTE — PROGRESS NOTES
Patient: Aletha Smith is a 42 y.o. male who presents today with the following Chief Complaint(s):  Chief Complaint   Patient presents with    Medication Refill         HPI   ADHD: adderall 30mg BID- doing well- BP and weight stable- helping him to concentrate   Insomnia: takes ambien 3-4 nights a week     Current Outpatient Medications   Medication Sig Dispense Refill    amphetamine-dextroamphetamine (ADDERALL, 30MG,) 30 MG tablet Take 1 tablet by mouth 2 times daily for 30 days. 60 tablet 0    SYNTHROID 300 MCG tablet Take 1 tablet by mouth once daily 30 tablet 3    zolpidem (AMBIEN) 5 MG tablet TAKE 1 TABLET BY MOUTH NIGHTLY AS NEEDED FOR  SLEEP  FOR  UP  TO  30  DAYS 30 tablet 0    busPIRone (BUSPAR) 10 MG tablet TAKE 1 TABLET BY MOUTH THREE TIMES DAILY AS NEEDED FOR ANXIETY 270 tablet 0    buPROPion (WELLBUTRIN SR) 200 MG extended release tablet Take 1 tablet by mouth twice daily 60 tablet 5    UBRELVY 100 MG TABS       Multiple Vitamins-Minerals (THERAPEUTIC MULTIVITAMIN-MINERALS) tablet Take 1 tablet by mouth daily       No current facility-administered medications for this visit. Patient's past medical history, surgical history, family history, medications,  andallergies  were all reviewed and updated as appropriate today. Review of Systems   Constitutional: Negative for appetite change, chills, fever and unexpected weight change. Respiratory: Negative for shortness of breath and wheezing. Cardiovascular: Negative for chest pain and palpitations. Neurological: Positive for headaches. Psychiatric/Behavioral: Positive for sleep disturbance. Negative for decreased concentration. Physical Exam  Vitals and nursing note reviewed. Constitutional:       Appearance: Normal appearance. He is well-developed. HENT:      Head: Normocephalic and atraumatic.       Right Ear: External ear normal.      Left Ear: External ear normal.      Nose: Nose normal.      Mouth/Throat: Pharynx: No oropharyngeal exudate or posterior oropharyngeal erythema. Eyes:      Conjunctiva/sclera: Conjunctivae normal.   Cardiovascular:      Rate and Rhythm: Normal rate and regular rhythm. Heart sounds: Normal heart sounds. No murmur heard. Pulmonary:      Effort: Pulmonary effort is normal. No respiratory distress. Breath sounds: Normal breath sounds. No wheezing or rales. Musculoskeletal:         General: Normal range of motion. Cervical back: Normal range of motion. Lymphadenopathy:      Cervical: No cervical adenopathy. Skin:     General: Skin is warm and dry. Neurological:      General: No focal deficit present. Mental Status: He is alert and oriented to person, place, and time. Deep Tendon Reflexes: Reflexes are normal and symmetric. Psychiatric:         Mood and Affect: Mood normal.         Behavior: Behavior normal.         Thought Content: Thought content normal.         Judgment: Judgment normal.       Vitals:    05/26/22 1715   BP: 122/86   Pulse:    SpO2:        Assessment:  Encounter Diagnoses   Name Primary?  Attention deficit hyperactivity disorder (ADHD), unspecified ADHD type Yes    Insomnia, unspecified type        Plan:  1. Attention deficit hyperactivity disorder (ADHD), unspecified ADHD type  Continue adderall 30mg BID  stable    2. Insomnia, unspecified type  Continue ambien PRN  stable        No follow-ups on file.

## 2022-06-01 DIAGNOSIS — F51.01 PRIMARY INSOMNIA: ICD-10-CM

## 2022-06-02 RX ORDER — ZOLPIDEM TARTRATE 5 MG/1
TABLET ORAL
Qty: 30 TABLET | Refills: 0 | Status: SHIPPED | OUTPATIENT
Start: 2022-06-02 | End: 2022-07-05

## 2022-06-02 NOTE — TELEPHONE ENCOUNTER
Last Office Visit  -  5/26/22  Next Office Visit  -  n/a    Last Filled  -  5/2/22  Last UDS -  9/7/21  Contract -  9/9/21

## 2022-06-20 DIAGNOSIS — E03.9 ACQUIRED HYPOTHYROIDISM: ICD-10-CM

## 2022-06-20 LAB
T3 FREE: 3.7 PG/ML (ref 2.3–4.2)
T4 FREE: 2 NG/DL (ref 0.9–1.8)
TSH SERPL DL<=0.05 MIU/L-ACNC: 0.27 UIU/ML (ref 0.27–4.2)

## 2022-06-24 DIAGNOSIS — F90.9 ATTENTION DEFICIT HYPERACTIVITY DISORDER (ADHD), UNSPECIFIED ADHD TYPE: ICD-10-CM

## 2022-06-24 RX ORDER — DEXTROAMPHETAMINE SACCHARATE, AMPHETAMINE ASPARTATE, DEXTROAMPHETAMINE SULFATE AND AMPHETAMINE SULFATE 7.5; 7.5; 7.5; 7.5 MG/1; MG/1; MG/1; MG/1
30 TABLET ORAL 2 TIMES DAILY
Qty: 60 TABLET | Refills: 0 | Status: SHIPPED | OUTPATIENT
Start: 2022-06-24 | End: 2022-08-01 | Stop reason: SDUPTHER

## 2022-06-24 NOTE — TELEPHONE ENCOUNTER
Last Office Visit  -  5/26/22  Next Office Visit  -  n/a    Last Filled  -  5/24/22  Last UDS -  9/7/21  Contract -  9/7/21

## 2022-06-27 ENCOUNTER — OFFICE VISIT (OUTPATIENT)
Dept: ENDOCRINOLOGY | Age: 42
End: 2022-06-27
Payer: COMMERCIAL

## 2022-06-27 VITALS
HEART RATE: 90 BPM | TEMPERATURE: 98 F | DIASTOLIC BLOOD PRESSURE: 89 MMHG | RESPIRATION RATE: 14 BRPM | HEIGHT: 75 IN | SYSTOLIC BLOOD PRESSURE: 139 MMHG | BODY MASS INDEX: 35.68 KG/M2 | WEIGHT: 287 LBS

## 2022-06-27 DIAGNOSIS — E03.9 ACQUIRED HYPOTHYROIDISM: Primary | ICD-10-CM

## 2022-06-27 PROCEDURE — G8427 DOCREV CUR MEDS BY ELIG CLIN: HCPCS | Performed by: INTERNAL MEDICINE

## 2022-06-27 PROCEDURE — 1036F TOBACCO NON-USER: CPT | Performed by: INTERNAL MEDICINE

## 2022-06-27 PROCEDURE — 99214 OFFICE O/P EST MOD 30 MIN: CPT | Performed by: INTERNAL MEDICINE

## 2022-06-27 PROCEDURE — G8417 CALC BMI ABV UP PARAM F/U: HCPCS | Performed by: INTERNAL MEDICINE

## 2022-06-27 RX ORDER — LEVOTHYROXINE SODIUM 300 MCG
TABLET ORAL
Qty: 30 TABLET | Refills: 5 | Status: SHIPPED | OUTPATIENT
Start: 2022-06-27

## 2022-06-27 NOTE — PROGRESS NOTES
Subjective:      41 y/o WM who is here for thyroid evaluation. Referred by Landy Georges    Interim:    Fatigue since started playing sport with daughter    He was diagnosed with hypothyroidism years ago    Dx with Hashimoto's    H/o left lobectomy    Has been on different dosages    Currently taking levothyroxine 200+75  Dose changed on 9/21    Takes early in the morning, on empty stomach    9/21 TSH 6.76  FT4 1.5  6/21 TSH 10.34 FT4 1.4  2/21 TSH 6.11  8/20 TSH 28.06  4/19   Off medication  4/18 TSH 12.42  10/17 TSH 0.42    Initial complaints:see HPI  fatigue    History of radiation to patient's neck:  no  Family history includes thyroid issue  Family history of thyroid cancer: older sister thyroid cancer    12/21  TSH 5.94 FT4 1.4 FT3 3.3    Switched to Synthroid 300mcg daily    6/22 TSH 0.27  FT4 2.0 FT3 3.7      7/21 USG    FINDINGS:   Right thyroid lobe:  6.7 x 3.3 x 2.7 cm       Left thyroid lobe:  Not measured       Isthmus:  9 mm       Thyroid Gland:  Status post left thyroidectomy.  The right thyroid gland is   heterogeneously enlarged       Nodules: No thyroid nodules are present.       Cervical lymphadenopathy: No abnormal lymph nodes in the imaged portions of   the neck. He has a h/o gastric sleeve    Mild.  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     Past Medical History:   Diagnosis Date    Depression     Hemiplegic migraine 05/2020    Irritable     Nephrolith 5/2010    Sleep apnea     uses c-pap    Thyroid disease     hypothyroid - chronic lymphocytic thyroiditis     Past Surgical History:   Procedure Laterality Date    HERNIA REPAIR  4/5/16    ventral    [place] LAP, IGNACIO RESTRICT PROC, LONGITUDINAL GASTRECTOMY N/A 10/8/2018    LAPAROSCOPIC SLEEVE GASTRECTOMY-ETHICON performed by Shannan Granados MD at [address] [place] [address], PARTIAL  3/15    UPPER GASTROINTESTINAL ENDOSCOPY  05/11/2018    VASECTOMY N/A 2012    WISDOM TOOTH EXTRACTION       Current Outpatient Medications   Medication Sig Dispense Refill    amphetamine-dextroamphetamine (ADDERALL, 30MG,) 30 MG tablet Take 1 tablet by mouth 2 times daily for 30 days. 60 tablet 0    zolpidem (AMBIEN) 5 MG tablet TAKE 1 TABLET BY MOUTH NIGHTLY AS NEEDED FOR SLEEP FOR  UP  TO  30  DAYS 30 tablet 0    SYNTHROID 300 MCG tablet Take 1 tablet by mouth once daily 30 tablet 3    busPIRone (BUSPAR) 10 MG tablet TAKE 1 TABLET BY MOUTH THREE TIMES DAILY AS NEEDED FOR ANXIETY 270 tablet 0    buPROPion (WELLBUTRIN SR) 200 MG extended release tablet Take 1 tablet by mouth twice daily 60 tablet 5    UBRELVY 100 MG TABS       Multiple Vitamins-Minerals (THERAPEUTIC MULTIVITAMIN-MINERALS) tablet Take 1 tablet by mouth daily       No current facility-administered medications for this visit. Review of Systems  Please see scanned     Objective:    /89   Pulse 90   Temp 98 °F (36.7 °C)   Resp 14   Ht 6' 3\" (1.905 m)   Wt 287 lb (130.2 kg)   BMI 35.87 kg/m²   Wt Readings from Last 3 Encounters:   06/27/22 287 lb (130.2 kg)   05/26/22 289 lb (131.1 kg)   02/14/22 292 lb (132.5 kg)     Vitals:    06/27/22 1457   BP: 139/89   Pulse: 90   Resp: 14   Temp: 98 °F (36.7 °C)       Constitutional: Well-developed, appears stated age, cooperative, in no acute distress  H/E/N/M/T:atraumatic, normocephalic, external ears, nose, lips normal without lesions  Eyes: Lids, lashes, conjunctivae and sclerae normal, No proptosis, no redness  Neck: supple, symmetrical, no swelling  Skin: No obvious rashes or lesions present.   Skin and hair texture normal  Psychiatric: Judgement and Insight:  judgement and insight appear normal  Neuro: Normal without focal findings, speech is normal normal, speech is spontaneous  Chest: No labored breathing, no chest deformity, no stridor  Musculoskeletal: No joint deformity, swelling      Lab Review  Lab Results   Component Value Date    TSH 0.27 06/20/2022     No results found for: Lucio Ribera Assessment: 1. Hypothyroidism: For years, not controlled. TSH has been fluctuating. Discussed possible causes. -ve  HAMA. Normal T3 level. May have some THR as TSH/T4 have been discordant sometimes. Repeat level showed TSH 0.27 FT4 2.0 T3 normal    Plan: 1. Synthroid 300mcg daily  2. TFT in 6 months

## 2022-07-04 DIAGNOSIS — F51.01 PRIMARY INSOMNIA: ICD-10-CM

## 2022-07-05 RX ORDER — ZOLPIDEM TARTRATE 5 MG/1
TABLET ORAL
Qty: 30 TABLET | Refills: 0 | Status: SHIPPED | OUTPATIENT
Start: 2022-07-05 | End: 2022-08-01 | Stop reason: SDUPTHER

## 2022-07-05 NOTE — TELEPHONE ENCOUNTER
Last Office Visit  -  5/26/22  Next Office Visit  -  n/a    Last Filled  -  6/2/22  Last UDS -  9/7/21  Contract -  9/7/21

## 2022-07-12 NOTE — PROGRESS NOTES
2020     Isabella Mederos (:  1980) is a 35 y.o. male, here for evaluation of the following medical concerns:   HPI  He is here to discuss anxiety and depression. Depression for years, was on abilify, he also had rage issues at that time. Gastric bypass- went off abilify, rage has resolved, not an issue. Depression has hit him, family members also with depression, mom and sister. He had tried other things, not effective. Dr Arlin Bailon prescribed previously. Notes: zoloft, lexapo, wellbutrin   Does not want one that has a side effect of weight gain- due to gastric bypass. Anxiety- heart races and wants to break down at times, sleep tylenol PM to help get to sleep    Needs to check TSH- takes medication every morning. Review of Systems   Constitutional: Positive for activity change. Negative for appetite change, chills, diaphoresis, fatigue, fever and unexpected weight change. HENT: Negative for congestion, ear pain, postnasal drip, sinus pressure, sinus pain, sore throat and trouble swallowing. Eyes: Negative for discharge. Respiratory: Negative for cough, chest tightness and shortness of breath. Cardiovascular: Negative for chest pain, palpitations and leg swelling. Gastrointestinal: Negative for abdominal pain, constipation, diarrhea and nausea. Endocrine: Negative for cold intolerance, heat intolerance and polyuria. Musculoskeletal: Negative for back pain and gait problem. Skin: Negative for color change and rash. Allergic/Immunologic: Negative for environmental allergies, food allergies and immunocompromised state. Neurological: Negative for dizziness, weakness and headaches. Hematological: Does not bruise/bleed easily. Psychiatric/Behavioral: Positive for dysphoric mood and sleep disturbance. Negative for confusion. The patient is nervous/anxious. Prior to Visit Medications    Medication Sig Taking?  Authorizing Provider   buPROPion [facility] FOR [facility] SR) 150 Heart sounds: Normal heart sounds. Pulmonary:      Effort: Pulmonary effort is normal. No respiratory distress. Breath sounds: Normal breath sounds. No wheezing or rhonchi. Abdominal:      General: Bowel sounds are normal.      Palpations: Abdomen is soft. Musculoskeletal: Normal range of motion. General: No deformity. Skin:     General: Skin is warm and dry. Capillary Refill: Capillary refill takes less than 2 seconds. Coloration: Skin is not jaundiced. Findings: No erythema. Neurological:      Mental Status: He is alert and oriented to person, place, and time. Psychiatric:         Mood and Affect: Mood is depressed. Speech: Speech normal.         Behavior: Behavior normal. Behavior is cooperative. Thought Content: Thought content normal.         Judgment: Judgment normal.      Comments: Patient open to discussion. He goes to therapy with his wife. Both having issues with depression at this time. He just seemed down. ASSESSMENT/PLAN:  Cyn Pink was seen today for anxiety and depression. Diagnoses and all orders for this visit:    Severe episode of recurrent major depressive disorder, without psychotic features ([facility]  -     buPROPion (WELLBUTRIN SR) 150 MG extended release tablet; Take 1 tablet by mouth 2 times daily  -     busPIRone (BUSPAR) 10 MG tablet; Take 1 tablet by mouth 3 times daily as needed (anxiety)    Hypothyroidism, unspecified type  -     TSH WITH REFLEX TO FT4    Anxiety  -     buPROPion (WELLBUTRIN SR) 150 MG extended release tablet; Take 1 tablet by mouth 2 times daily  -     busPIRone (BUSPAR) 10 MG tablet; Take 1 tablet by mouth 3 times daily as needed (anxiety)    Positive depression screening  -     Positive Screen for Clinical Depression with a Documented Follow-up Plan       Return in about 6 weeks (around 3/25/2020) for med management. An electronic signature was used to authenticate this note. --Patricia Crump Parents, 127.4

## 2022-07-13 ENCOUNTER — PATIENT MESSAGE (OUTPATIENT)
Dept: FAMILY MEDICINE CLINIC | Age: 42
End: 2022-07-13

## 2022-07-13 DIAGNOSIS — H91.92 HEARING LOSS OF LEFT EAR, UNSPECIFIED HEARING LOSS TYPE: Primary | ICD-10-CM

## 2022-07-15 NOTE — PROGRESS NOTES
Andrea Ramos.   1980, 36 y.o. male   [id number]       Referring Provider: KUSUM Daniel CNP   Referral Type: In an order in [address] [place] [address]    Reason for Visit: Evaluation of the cause of disorders of hearing    ADULT AUDIOLOGIC EVALUATION      Andrea Iverson is a 36 y.o. male seen today, 7/18/2022 , for an initial audiologic evaluation. Patient was accompanied by his wife. AUDIOLOGIC AND OTHER PERTINENT MEDICAL HISTORY:      Andrea Ramos. noted recent hearing screening at work revealed asymmetric hearing loss with left ear worse than right. He notes a perceived gradual decline in hearing in the left ear. Patient also reports history of occupational noise exposure for 20+ years. No additional significant otologic or medical history was reported. Andrea Iverson denied otalgia, aural fullness, otorrhea, tinnitus, dizziness, imbalance, history of falls, history of head trauma, history of ear surgery, and family history of hearing loss. Date: 7/18/2022     IMPRESSIONS:      Today's results revealed an asymmetric hearing loss bilaterally. Asymmetries > 10 dBHL noted from 500-8000Hz and in word recognition with left ear (72%) worse than right (100%). Recommended consult with ENT physician due to noted asymmetries. ASSESSMENT AND FINDINGS:     Otoscopy revealed: Clear ear canals bilaterally    RIGHT EAR:  Hearing Sensitivity: Within normal limits through 4kHz sloping to a mild hearing loss from 6-8kHz. Speech Recognition Threshold: 10 dB HL  Word Recognition: Excellent (100%), based on NU-6 25-word list at 55 dBHL using recorded speech stimuli. Tympanometry: Normal peak pressure and compliance, Type A tympanogram, consistent with normal middle ear function.   Acoustic Reflexes: Ipsilateral: Could not maintain seal. Contralateral: Could not maintain seal.      LEFT EAR:  Hearing Sensitivity: Within normal limits through 250Hz sloping to a mild to severe to profound Profound 90 +

## 2022-07-18 ENCOUNTER — PROCEDURE VISIT (OUTPATIENT)
Dept: AUDIOLOGY | Age: 42
End: 2022-07-18
Payer: COMMERCIAL

## 2022-07-18 DIAGNOSIS — H91.90 HEARING LOSS, UNSPECIFIED HEARING LOSS TYPE, UNSPECIFIED LATERALITY: Primary | ICD-10-CM

## 2022-07-18 DIAGNOSIS — H91.8X1 OTHER HEARING LOSS OF RIGHT EAR WITH RESTRICTED HEARING OF LEFT EAR: ICD-10-CM

## 2022-07-18 DIAGNOSIS — H90.A22 SENSORINEURAL HEARING LOSS (SNHL) OF LEFT EAR WITH RESTRICTED HEARING OF RIGHT EAR: Primary | ICD-10-CM

## 2022-07-18 PROCEDURE — 92567 TYMPANOMETRY: CPT | Performed by: AUDIOLOGIST

## 2022-07-18 PROCEDURE — 92557 COMPREHENSIVE HEARING TEST: CPT | Performed by: AUDIOLOGIST

## 2022-07-18 NOTE — PATIENT INSTRUCTIONS
Good Communication Strategies    Communication can be challenging for anyone, but can be especially difficult for those with some degree of hearing loss. While we may not be able to control every factor that may lead to difficulty with communication, there are Good Communication Strategies that we can all use in our day-to-day lives. Communication takes both parties working together for it to be successful. Tips as a Listener:   Control your environment. It is important to limit the amount of background noise in the room when possible. You should also consider having a good light source in the room to best see the other person. Ask for clarification. Instead of saying \"What?\", you can use parts of what you heard to make a new question. For example, if you heard the word \"Thursday\" but not the rest of the week, you may ask \"What was that about Thursday? \" or \"What did you want to do Thursday? \". This shows the person talking that you are listening and will help them better explain what they are saying. Be an advocate for yourself. If you are hearing but not understanding, tell the other person \"I can hear you, but I need you to slow down when you speak. \"  Or if someone is facing the other direction, say \"I cannot hear you when you are not looking at me when we talk. \"       Tips as a Talker:   - Sit or stand 3 to 6 feet away to maximize audibility         -- It is unrealistic to believe someone else will fully hear your message if you are speaking from across the room or in a different room in the house   - Stay at eye level to help with visual cues   - Make sure you have the persons attention before speaking   - Use facial expressions and gestures to accentuate your message   - Raise your voice slightly (do not scream)   - Speak slowly and distinctly   - Use short, simple sentences   - Rephrase your words if the person is having a hard time understanding you    - To avoid distortion, dont speak directly into a persons ear      Some additional items that may be helpful:   - Remain patient - this is important for both parties   - Write down items that still cannot be heard/understood. You may write with pen/paper or consider typing/texting on a cell phone or smart device. - If background noise is unavoidable, try to keep yourself in a good position in the room. By sitting at a blackburn on the side of the restaurant (preferably a corner), it will be easier to communicate than if you were sitting at a table in the middle with background noise surrounding you. Keep yourself positioned away from music speakers or heavy foot traffic. Hearing Loss: Care Instructions  Your Care Instructions      Hearing loss is a sudden or slow decrease in how well you hear. It can range from mild to profound. Permanent hearing loss can occur with aging, and it can happen when you are exposed long-term to loud noise. Examples include listening to loud music, riding motorcycles, or being around other loud machines. Hearing loss can affect your work and home life. It can make you feel lonely or depressed. You may feel that you have lost your independence. But hearing aids and other devices can help you hear better and feel connected to others. Follow-up care is a key part of your treatment and safety. Be sure to make and go to all appointments, and call your doctor if you are having problems. It's also a good idea to know your test results and keep a list of the medicines you take. How can you care for yourself at home? Avoid loud noises whenever possible. This helps keep your hearing from getting worse. Always wear hearing protection around loud noises. If appropriate, wear hearing aid(s) as directed. It is recommended that hearing aids are worn during all waking hours to keep your brain active and give it access to the sounds it is missing.       If you are beginning your process with hearing aid(s), schedule a \"Hearing Aid Evaluation\" with an audiologist to discuss your lifestyle, features of hearing aid technology, and styles of hearing aids available. It is recommended that you contact your insurance company to determine if you have a hearing aid benefit, as this may dictate who you can see for these services. Have hearing tests as your doctor suggests. They can show whether your hearing has changed. Your hearing aid may need to be adjusted. Use other assistive devices as needed. These may include:  Telephone amplifiers and hearing aids that can connect to a television, stereo, radio, or microphone. Devices that use lights or vibrations. These alert you to the doorbell, a ringing telephone, or a baby monitor. Television closed-captioning. This shows the words at the bottom of the screen. Most new TVs can do this. TTY (text telephone). This lets you type messages back and forth on the telephone instead of talking or listening. These devices are also called TDD. When messages are typed on the keyboard, they are sent over the phone line to a receiving TTY. The message is shown on a monitor. Use pagers, fax machines, text, and email if it is hard for you to communicate by telephone. Try to learn a listening technique called speech-reading. It is not lip-reading. You pay attention to people's gestures, expressions, posture, and tone of voice. These clues can help you understand what a person is saying. Face the person you are talking to, and have him or her face you. Make sure the lighting is good. You need to see the other person's face clearly. Think about counseling if you need help to adjust to your hearing loss. When should you call for help? Watch closely for changes in your health, and be sure to contact your doctor if:    You think your hearing is getting worse. You have new symptoms, such as dizziness or nausea.           Noise-Induced Hearing Loss  What it is, and what you can do to prevent it      Exposure to loud sounds, in an occupational setting or recreational, can cause permanent hearing loss. Sound is measured in decibels (dB). Noise-induced hearing loss is the ONLY type of preventable hearing loss. Hearing loss related to noise exposure can occur at any age. There are small sensory cells, called inner and outer hair cells, within the inner ear (cochlea). These cells process the loudness (intensity) and pitch (frequency) of sound and send the signal to the brain via our auditory nerve (vestibulocochlear nerve, cranial nerve VIII). When these cells are damaged, they can result in permanent hearing loss and/or tinnitus. The hair cells responsible for high frequency sounds, like birds chirping, are most likely to be damaged due to loud sounds. The high frequency sounds are also very important for our clarity and understanding of speech. OCCUPATIONAL NOISE EXPOSURE RECREATIONAL NOISE EXPOSURE   Some jobs may have exposure to loud sounds in the workplace. These jobs may include but are not limited to:  [address] [place] [address] goBramble settings  Manufacturing  Construction  Welding  Landscaping  Hairdressing/hairstyling  [facility]   . .. And more! Many activities outside of work may cause permanent hearing loss. These activities may include but are not limited to:  Lawnmowers, leaf blowers  Farming equipment and animals (such as pigs squealing)  Chainsaws and other power tools  Playing musical instruments and/or singing  Listening to music too loudly - at concerts, through stereo, through ear buds or headphones  Attending sporting events  Attending fireworks shows or using fireworks at home  Use of firearms  . .. And more! REDUCE OR PROTECT YOUR EARS FROM NOISE EXPOSURE    To do your best to avoid noise-induced hearing loss, here are some tips:  Limit exposure to loud sounds. 85 dB (decibels) is safe for 8 hours.   As sounds are louder, the length of time the sound is safe lessens. These numbers are cumulative across a 24-hour period. (NIOSH and CDC, 2002)  85 dB is safe for 8 hours  88 dB is safe for 4 hours  91 dB is safe for 2 hours  94 dB is safe for 1 hour  97 dB is safe for 30 minutes  100 dB is safe for 15 minutes  103 dB is safe for 7.5 minutes  106 dB is safe for 3.75 minutes  109 dB is safe for LESS THAN 2 minutes  112 dB is safe for LESS THAN 1 minute  115 dB is safe for ~ 30 seconds  130 dB can cause IMMEDIATE hearing loss  If you are unsure if a sound is too loud, consider checking the sound level with a \"sound level meter\". There are apps on smart devices that can measure the loudness of the sound. They are not as accurate as expensive equipment used by scientists, but it will give you a guesstimate of how loud the sound is, and if it may be damaging to your hearing. If you cannot avoid loud sounds, here are ways to reduce your exposure:  1. Wear hearing protection  Ear plugs and protective ear muffs can be used to reduce the intensity of the sound. The higher the NRR (noise reduction rating), the better reduction of the intensity of the sound   2. Turn the volume down  When listening to music, turn the volume down, especially when wearing ear buds or headphones. A good rule of thumb is to not go beyond the middle setting on your device. If you can't hear someone talking to you from arm's length away, your music may be at a level that it can cause damage. If someone else can hear your music from 3 feet away, it may also be at a level that it can cause damage. 3. Walk away from the sound  If you do not have the ability to wear hearing protection or turn down the volume of the sound, you should do your best to move away from the source of the sound. Sound decreases in intensity as we move further from the source. The sound will decrease by 6 dB for every doubling of distance from the sound source.     Exposure to these sounds may cause permanent damage to your hearing.   If you suspect your hearing has changed, it is recommended that you have your hearing tested by your audiologist.

## 2022-07-18 NOTE — PROGRESS NOTES
[facility] - Alexandra Jones MD  [address] [place] [address].  [address] [place] [address], [facility] Mariana Mike  Ph: [phone]    Let patient know referral has been placed for ENT for follow up on hearing loss per audiology

## 2022-07-18 NOTE — Clinical Note
Melyssa Oas you for your referral for audiometric testing on this patient. Today's results revealed an asymmetric hearing loss bilaterally. Asymmetries > 10 dBHL noted from 500-8000Hz and in word recognition with left ear (72%) worse than right (100%). Recommended consult with ENT physician due to noted asymmetries. Please see the scanned audiogram (under \"Media\" tab) and encounter note for details. If you have any questions, or if there is anything else you need, please let me know.    Lovely Forman Audiologist --- [facility] - Audiology

## 2022-07-19 ENCOUNTER — TELEPHONE (OUTPATIENT)
Dept: FAMILY MEDICINE CLINIC | Age: 42
End: 2022-07-19

## 2022-07-21 ENCOUNTER — OFFICE VISIT (OUTPATIENT)
Dept: ENT CLINIC | Age: 42
End: 2022-07-21
Payer: COMMERCIAL

## 2022-07-21 VITALS — BODY MASS INDEX: 35.68 KG/M2 | WEIGHT: 287 LBS | TEMPERATURE: 97.2 F | HEIGHT: 75 IN | RESPIRATION RATE: 16 BRPM

## 2022-07-21 DIAGNOSIS — H90.3 ASNHL (ASYMMETRICAL SENSORINEURAL HEARING LOSS): Primary | ICD-10-CM

## 2022-07-21 DIAGNOSIS — H93.13 TINNITUS OF BOTH EARS: ICD-10-CM

## 2022-07-21 PROCEDURE — 99204 OFFICE O/P NEW MOD 45 MIN: CPT | Performed by: STUDENT IN AN ORGANIZED HEALTH CARE EDUCATION/TRAINING PROGRAM

## 2022-07-21 ASSESSMENT — ENCOUNTER SYMPTOMS
RHINORRHEA: 0
EYE PAIN: 0
COUGH: 0
VOMITING: 0
NAUSEA: 0
SHORTNESS OF BREATH: 0

## 2022-07-21 NOTE — PROGRESS NOTES
[facility]      Patient Name: Vita Adame. Medical Record Number:  [id number]  Primary Care Physician:  KUSUM Ellis CNP  Date of Consultation: 7/21/2022    Chief Complaint:   Chief Complaint   Patient presents with    New Patient     Patient here today to follow up after hearing test- Denies any other concerns        [facility](n) 36 y.o. male who presents for evaluation of hearing loss. He had an audiogram performed on July 18 which showed a left-sided hearing loss. He states he has noticed a hearing loss for about 5 or 6 years but in the last 2 it has worsened. He denies any family history of hearing loss. He did work in a [address] but used hearing protection when working. He did have an MRI of his brain to rule out a stroke in 2020 but this is not fully visualized the internal auditory canals. He has never had any ear surgeries. He does have occasional ringing.       Patient Active Problem List   Diagnosis    Chronic lymphocytic thyroiditis    Primary insomnia    Irritable    Class 2 obesity due to excess calories without serious comorbidity in adult    Morbid obesity with BMI of 40.0-44.9, adult (HCC)    Snoring    Chronic GERD    Precordial pain    MILLER (dyspnea on exertion)    Mixed hyperlipidemia    Vitamin D deficiency    Obstructive sleep apnea    S/P laparoscopic sleeve gastrectomy    Severe obesity (BMI 35.0-35.9 with comorbidity) ([address]    Pain of right shoulder region    Strain of right shoulder    Lower extremity paralysis ([address]    Thyroid activity decreased     Past Surgical History:   Procedure Laterality Date    HERNIA REPAIR  04/05/2016    ventral    VT LAP, IGNACIO RESTRICT PROC, LONGITUDINAL GASTRECTOMY N/A 10/08/2018    LAPAROSCOPIC SLEEVE GASTRECTOMY-ETHICON performed by Yee Cedeno MD at [address] [place] [address] Rd., PARTIAL  03/2015    UPPER GASTROINTESTINAL ENDOSCOPY  05/11/2018 VASECTOMY N/A     WISDOM TOOTH EXTRACTION       Family History   Problem Relation Age of Onset    Depression Mother     Arthritis Father         RA    Cancer Sister         thyroid    Depression Sister     Cancer Paternal Grandfather         lung    Cancer Paternal Aunt         lung     Social History     Socioeconomic History    Marital status:      Spouse name: Not on file    Number of children: Not on file    Years of education: Not on file    Highest education level: Not on file   Occupational History    Not on file   Tobacco Use    Smoking status: Former     Packs/day: 0.50     Years: 17.00     Pack years: 8.50     Types: Cigarettes     Quit date: 2018     Years since quittin.5    Smokeless tobacco: Never   Vaping Use    Vaping Use: Never used   Substance and Sexual Activity    Alcohol use: Yes     Comment: 1MTH    Drug use: No    Sexual activity: Yes     Partners: Female     Comment: Indira = wife   Other Topics Concern    Not on file   Social History Narrative    Not on file     Social Determinants of Health     Financial Resource Strain: Not on file   Food Insecurity: Not on file   Transportation Needs: Not on file   Physical Activity: Not on file   Stress: Not on file   Social Connections: Not on file   Intimate Partner Violence: Not on file   Housing Stability: Not on file       DRUG/FOOD ALLERGIES: Other    CURRENT MEDICATIONS  Prior to Admission medications    Medication Sig Start Date End Date Taking? Authorizing Provider   zolpidem (AMBIEN) 5 MG tablet TAKE 1 TABLET BY MOUTH NIGHTLY AS NEEDED FOR SLEEP FOR  UP  TO  30  DAYS 22 Yes KUSUM Tomlinson CNP   SYNTHROID 300 MCG tablet Take 1 tablet by mouth once daily 22  Yes Ramin Barr MD   amphetamine-dextroamphetamine (ADDERALL, 30MG,) 30 MG tablet Take 1 tablet by mouth 2 times daily for 30 days.  22 Yes KUSUM Rios CNP   busPIRone (BUSPAR) 10 MG tablet TAKE 1 TABLET BY MOUTH THREE TIMES DAILY AS NEEDED FOR ANXIETY 4/4/22  Yes KUSUM De León CNP   buPROPion [facility] SR) 200 MG extended release tablet Take 1 tablet by mouth twice daily 2/3/22  Yes KUSUM De León CNP   UBRELVY 100 MG TABS  11/18/21  Yes Historical Provider, MD   Multiple Vitamins-Minerals (THERAPEUTIC MULTIVITAMIN-MINERALS) tablet Take 1 tablet by mouth daily   Yes Historical Provider, MD       REVIEW OF SYSTEMS  The following systems were reviewed and revealed the following in addition to any already discussed in the HPI:    Review of Systems   Constitutional:  Negative for fatigue and fever. HENT:  Positive for hearing loss and tinnitus. Negative for congestion, ear pain, postnasal drip, rhinorrhea and sneezing. Eyes:  Negative for pain and visual disturbance. Respiratory:  Negative for cough and shortness of breath. Cardiovascular:  Negative for chest pain. Gastrointestinal:  Negative for nausea and vomiting. Endocrine: Negative. Genitourinary: Negative. Musculoskeletal:  Negative for neck pain and neck stiffness. Skin:  Negative for rash. Neurological:  Negative for dizziness and headaches.         PHYSICAL EXAM  Temp 97.2 °F (36.2 °C) (Infrared)   Resp 16   Ht 6' 3\" (1.905 m)   Wt 287 lb (130.2 kg)   BMI 35.87 kg/m²     GENERAL: No Acute Distress, Alert and Oriented, no hoarseness  EYES: EOMI, Anti-icteric  NOSE: No epistaxis, nasal mucosa within normal limits, no purulent drainage  EARS: Normal external canal appearance, EAC patent bilaterally, TMs intact bilaterally with no evidence of effusion  FACE: 1/6 House-Brackmann Scale, symmetric, sensation equal bilaterally  ORAL CAVITY: No masses or lesions palpated, uvula is midline, moist mucous membranes,   NECK: Normal range of motion, no thyromegaly, trachea is midline, no lymphadenopathy, no neck masses, no crepitus  CHEST: Normal respiratory effort, no retractions, breathing comfortably  SKIN: No rashes, normal appearing skin, no evidence of skin lesions/tumors    RADIOLOGY  Summary of findings:      PROCEDURE  See scanned audiogram dated 7/18/2022  for results. ASSESSMENT/PLAN  Zoe Puckett is a very pleasant 36 y.o. male with     1. ASNHL (asymmetrical sensorineural hearing loss)  Given his asymmetric hearing loss we will obtain MRI to rule out any retrocochlear lesions. The MRI performed in 2020 is not sufficient to evaluate internal auditory canal.  If he has no retrocochlear lesions we will likely proceed with hearing aids on the left side. - MRI IAC POSTERIOR FOSSA W WO CONTRAST; Future    2. Tinnitus of both ears  Masking techniques were discussed with the patient. He will follow-up in approximately 1 year for repeat hearing test.  I will call him with results of MRI as they are available. Medical Decision Making:   The following items were considered in medical decision making:  Independent review of images  Review / order clinical lab tests  Review / order radiology tests  Decision to obtain old records

## 2022-07-24 DIAGNOSIS — F41.9 ANXIETY: ICD-10-CM

## 2022-07-25 RX ORDER — BUSPIRONE HYDROCHLORIDE 10 MG/1
TABLET ORAL
Qty: 270 TABLET | Refills: 0 | Status: SHIPPED | OUTPATIENT
Start: 2022-07-25

## 2022-07-28 ENCOUNTER — HOSPITAL ENCOUNTER (OUTPATIENT)
Dept: MRI IMAGING | Age: 42
Discharge: HOME | End: 2022-07-28
Payer: COMMERCIAL

## 2022-07-28 DIAGNOSIS — H90.3 ASNHL (ASYMMETRICAL SENSORINEURAL HEARING LOSS): ICD-10-CM

## 2022-07-28 PROCEDURE — 70553 MRI BRAIN STEM W/O & W/DYE: CPT

## 2022-07-28 PROCEDURE — A9579 GAD-BASE MR CONTRAST NOS,1ML: HCPCS | Performed by: STUDENT IN AN ORGANIZED HEALTH CARE EDUCATION/TRAINING PROGRAM

## 2022-07-28 PROCEDURE — 6360000004 HC RX CONTRAST MEDICATION: Performed by: STUDENT IN AN ORGANIZED HEALTH CARE EDUCATION/TRAINING PROGRAM

## 2022-07-28 RX ADMIN — GADOTERIDOL 20 ML: 279.3 INJECTION, SOLUTION INTRAVENOUS at 15:32

## 2022-08-01 ENCOUNTER — TELEPHONE (OUTPATIENT)
Dept: ENT CLINIC | Age: 42
End: 2022-08-01

## 2022-08-01 DIAGNOSIS — F51.01 PRIMARY INSOMNIA: ICD-10-CM

## 2022-08-01 DIAGNOSIS — F90.9 ATTENTION DEFICIT HYPERACTIVITY DISORDER (ADHD), UNSPECIFIED ADHD TYPE: ICD-10-CM

## 2022-08-02 ENCOUNTER — TELEPHONE (OUTPATIENT)
Dept: ENT CLINIC | Age: 42
End: 2022-08-02

## 2022-08-02 DIAGNOSIS — D33.3 LEFT ACOUSTIC NEUROMA (HCC): Primary | ICD-10-CM

## 2022-08-02 RX ORDER — DEXTROAMPHETAMINE SACCHARATE, AMPHETAMINE ASPARTATE, DEXTROAMPHETAMINE SULFATE AND AMPHETAMINE SULFATE 7.5; 7.5; 7.5; 7.5 MG/1; MG/1; MG/1; MG/1
30 TABLET ORAL 2 TIMES DAILY
Qty: 60 TABLET | Refills: 0 | Status: SHIPPED | OUTPATIENT
Start: 2022-08-02 | End: 2022-09-29 | Stop reason: SDUPTHER

## 2022-08-02 RX ORDER — BUPROPION HYDROCHLORIDE 200 MG/1
TABLET, EXTENDED RELEASE ORAL
Qty: 60 TABLET | Refills: 5 | Status: SHIPPED | OUTPATIENT
Start: 2022-08-02

## 2022-08-02 RX ORDER — ZOLPIDEM TARTRATE 5 MG/1
TABLET ORAL
Qty: 30 TABLET | Refills: 0 | Status: SHIPPED | OUTPATIENT
Start: 2022-08-02 | End: 2022-09-07

## 2022-08-02 NOTE — TELEPHONE ENCOUNTER
Last Office Visit  -  5/26/22  Next Office Visit  -  n/a    Last Filled  -  6/24/22  Last UDS -  9/7/21  Contract -  9/7/21

## 2022-08-02 NOTE — PROGRESS NOTES
Called patient with results of MRI. I will send a [place]. Sharon [place] [facility] IAC posterior fossa with contrast  Enhancing 5 mm mass in the left internal auditory canal is most suggestive of   a schwannoma.

## 2022-08-02 NOTE — TELEPHONE ENCOUNTER
Last Office Visit  -  5/26/22  Next Office Visit  -  n/a     Last Filled  -  7/5/22  Last UDS -  9/7/21  Contract -  9/7/21

## 2022-09-06 DIAGNOSIS — F51.01 PRIMARY INSOMNIA: ICD-10-CM

## 2022-09-07 RX ORDER — ZOLPIDEM TARTRATE 5 MG/1
TABLET ORAL
Qty: 30 TABLET | Refills: 0 | Status: SHIPPED | OUTPATIENT
Start: 2022-09-07 | End: 2022-10-07 | Stop reason: SDUPTHER

## 2022-09-07 NOTE — TELEPHONE ENCOUNTER
Last Office Visit  -  5/26/22  Next Office Visit  -  n/a    Last Filled  -  8/2/22  Last UDS -  9/7/21  Contract -  9/7/21

## 2022-09-08 DIAGNOSIS — F90.9 ATTENTION DEFICIT HYPERACTIVITY DISORDER (ADHD), UNSPECIFIED ADHD TYPE: ICD-10-CM

## 2022-09-08 RX ORDER — DEXTROAMPHETAMINE SACCHARATE, AMPHETAMINE ASPARTATE, DEXTROAMPHETAMINE SULFATE AND AMPHETAMINE SULFATE 7.5; 7.5; 7.5; 7.5 MG/1; MG/1; MG/1; MG/1
30 TABLET ORAL 2 TIMES DAILY
Qty: 60 TABLET | Refills: 0 | OUTPATIENT
Start: 2022-09-08 | End: 2022-10-08

## 2022-09-29 ENCOUNTER — OFFICE VISIT (OUTPATIENT)
Dept: FAMILY MEDICINE CLINIC | Age: 42
End: 2022-09-29
Payer: COMMERCIAL

## 2022-09-29 VITALS
BODY MASS INDEX: 36 KG/M2 | DIASTOLIC BLOOD PRESSURE: 88 MMHG | HEART RATE: 86 BPM | SYSTOLIC BLOOD PRESSURE: 126 MMHG | OXYGEN SATURATION: 98 % | WEIGHT: 288 LBS

## 2022-09-29 DIAGNOSIS — F51.01 PRIMARY INSOMNIA: ICD-10-CM

## 2022-09-29 DIAGNOSIS — F90.9 ATTENTION DEFICIT HYPERACTIVITY DISORDER (ADHD), UNSPECIFIED ADHD TYPE: Primary | ICD-10-CM

## 2022-09-29 DIAGNOSIS — G43.909 MIGRAINE WITHOUT STATUS MIGRAINOSUS, NOT INTRACTABLE, UNSPECIFIED MIGRAINE TYPE: ICD-10-CM

## 2022-09-29 PROCEDURE — G8417 CALC BMI ABV UP PARAM F/U: HCPCS | Performed by: NURSE PRACTITIONER

## 2022-09-29 PROCEDURE — G8427 DOCREV CUR MEDS BY ELIG CLIN: HCPCS | Performed by: NURSE PRACTITIONER

## 2022-09-29 PROCEDURE — 1036F TOBACCO NON-USER: CPT | Performed by: NURSE PRACTITIONER

## 2022-09-29 PROCEDURE — 99214 OFFICE O/P EST MOD 30 MIN: CPT | Performed by: NURSE PRACTITIONER

## 2022-09-29 RX ORDER — DEXTROAMPHETAMINE SACCHARATE, AMPHETAMINE ASPARTATE, DEXTROAMPHETAMINE SULFATE AND AMPHETAMINE SULFATE 7.5; 7.5; 7.5; 7.5 MG/1; MG/1; MG/1; MG/1
30 TABLET ORAL 2 TIMES DAILY
Qty: 60 TABLET | Refills: 0 | Status: SHIPPED | OUTPATIENT
Start: 2022-09-29 | End: 2022-10-29

## 2022-09-29 ASSESSMENT — ENCOUNTER SYMPTOMS
SHORTNESS OF BREATH: 0
WHEEZING: 0

## 2022-09-29 NOTE — PROGRESS NOTES
Patient: Ama Hardy is a 36 y.o. male who presents today with the following Chief Complaint(s):  Chief Complaint   Patient presents with    Medication Refill     Adhd     Referral - General     [facility] weight [facility]          HPI  ADHD: adderall 30mg BID- tolerating well- BP on recheck was stable- no chest pain, SOB  Migraines: had a migraine this morning but went away with ubrelvy and tylenol. Insomnia: ambien 5mg nighlty- working well   Body mass index is 36 kg/m². - wants referral for [facility]     Current Outpatient Medications   Medication Sig Dispense Refill    amphetamine-dextroamphetamine (ADDERALL, 30MG,) 30 MG tablet Take 1 tablet by mouth 2 times daily for 30 days. 60 tablet 0    zolpidem (AMBIEN) 5 MG tablet TAKE 1 TABLET BY MOUTH NIGHTLY AS NEEDED FOR SLEEP FOR  UP  TO  30  DAYS 30 tablet 0    buPROPion (WELLBUTRIN SR) 200 MG extended release tablet Take 1 tablet by mouth twice daily 60 tablet 5    busPIRone (BUSPAR) 10 MG tablet TAKE 1 TABLET BY MOUTH THREE TIMES DAILY AS NEEDED FOR ANXIETY 270 tablet 0    SYNTHROID 300 MCG tablet Take 1 tablet by mouth once daily 30 tablet 5    UBRELVY 100 MG TABS       Multiple Vitamins-Minerals (THERAPEUTIC MULTIVITAMIN-MINERALS) tablet Take 1 tablet by mouth daily       No current facility-administered medications for this visit. Patient's past medical history, surgical history, family history, medications,  andallergies  were all reviewed and updated as appropriate today. Review of Systems   Constitutional:  Negative for chills and fever. Respiratory:  Negative for shortness of breath and wheezing. Cardiovascular:  Negative for chest pain and palpitations. Neurological:  Positive for headaches (migraines- had one this morning but meds helped). Physical Exam  Vitals and nursing note reviewed. Constitutional:       Appearance: Normal appearance. He is well-developed.    HENT:      Head: Normocephalic and atraumatic. Right Ear: External ear normal.      Left Ear: External ear normal.      Nose: Nose normal.      Mouth/Throat:      Pharynx: No oropharyngeal exudate or posterior oropharyngeal erythema. Eyes:      Conjunctiva/sclera: Conjunctivae normal.   Cardiovascular:      Rate and Rhythm: Normal rate and regular rhythm. Heart sounds: Normal heart sounds. No murmur heard. Pulmonary:      Effort: Pulmonary effort is normal. No respiratory distress. Breath sounds: Normal breath sounds. No wheezing or rales. Musculoskeletal:         General: Normal range of motion. Cervical back: Normal range of motion. Lymphadenopathy:      Cervical: No cervical adenopathy. Skin:     General: Skin is warm and dry. Neurological:      General: No focal deficit present. Mental Status: He is alert and oriented to person, place, and time. Deep Tendon Reflexes: Reflexes are normal and symmetric. Psychiatric:         Mood and Affect: Mood normal.         Behavior: Behavior normal.         Thought Content: Thought content normal.         Judgment: Judgment normal.     Vitals:    09/29/22 1654   BP: 126/88   Pulse:    SpO2:        Assessment:  Encounter Diagnoses   Name Primary? BMI 36.0-36.9,adult     Attention deficit hyperactivity disorder (ADHD), unspecified ADHD type Yes    Primary insomnia     Migraine without status migrainosus, not intractable, unspecified migraine type        Plan:  1. BMI 36.0-36.9,adult    - External Referral To Bariatrics    2. Attention deficit hyperactivity disorder (ADHD), unspecified ADHD type  Stable- continue med  - amphetamine-dextroamphetamine (ADDERALL, 30MG,) 30 MG tablet; Take 1 tablet by mouth 2 times daily for 30 days. Dispense: 60 tablet; Refill: 0    3. Primary insomnia  Stable on ambien 5mg nighlty- continue med    4.  Migraine without status migrainosus, not intractable, unspecified migraine type  Stable using ubrelvy and tylenol- continue med No follow-ups on file.

## 2022-10-07 DIAGNOSIS — F51.01 PRIMARY INSOMNIA: ICD-10-CM

## 2022-10-07 RX ORDER — ZOLPIDEM TARTRATE 5 MG/1
TABLET ORAL
Qty: 30 TABLET | Refills: 1 | Status: SHIPPED | OUTPATIENT
Start: 2022-10-07 | End: 2022-11-06

## 2022-11-06 DIAGNOSIS — F41.9 ANXIETY: ICD-10-CM

## 2022-11-07 RX ORDER — BUSPIRONE HYDROCHLORIDE 10 MG/1
TABLET ORAL
Qty: 270 TABLET | Refills: 0 | Status: SHIPPED | OUTPATIENT
Start: 2022-11-07

## 2022-11-21 DIAGNOSIS — F90.9 ATTENTION DEFICIT HYPERACTIVITY DISORDER (ADHD), UNSPECIFIED ADHD TYPE: ICD-10-CM

## 2022-11-22 RX ORDER — DEXTROAMPHETAMINE SACCHARATE, AMPHETAMINE ASPARTATE, DEXTROAMPHETAMINE SULFATE AND AMPHETAMINE SULFATE 7.5; 7.5; 7.5; 7.5 MG/1; MG/1; MG/1; MG/1
30 TABLET ORAL 2 TIMES DAILY
Qty: 60 TABLET | Refills: 0 | Status: SHIPPED | OUTPATIENT
Start: 2022-11-22 | End: 2022-12-22

## 2022-11-22 NOTE — TELEPHONE ENCOUNTER
Last Office Visit  -  09/29/2022  Next Office Visit  -  n/a    Last Filled  -  09/29/2022  Last UDS -  09/07/2021  Contract -  n/a

## 2022-11-29 ENCOUNTER — NURSE TRIAGE (OUTPATIENT)
Dept: OTHER | Facility: CLINIC | Age: 42
End: 2022-11-29

## 2022-11-29 ENCOUNTER — TELEMEDICINE (OUTPATIENT)
Dept: FAMILY MEDICINE CLINIC | Age: 42
End: 2022-11-29
Payer: COMMERCIAL

## 2022-11-29 DIAGNOSIS — J02.9 SORE THROAT: Primary | ICD-10-CM

## 2022-11-29 DIAGNOSIS — R05.9 COUGH, UNSPECIFIED TYPE: ICD-10-CM

## 2022-11-29 DIAGNOSIS — Z20.828 EXPOSURE TO THE FLU: ICD-10-CM

## 2022-11-29 LAB
INFLUENZA A ANTIBODY: NEGATIVE
INFLUENZA B ANTIBODY: NEGATIVE
KIT LOT NO., HCLOLOT: NORMAL
SARS-COV-2, POC: NORMAL
VALID INTERNAL CONTROL, POC: NORMAL
VENDOR AND KIT NAME POC: NORMAL

## 2022-11-29 PROCEDURE — G8484 FLU IMMUNIZE NO ADMIN: HCPCS | Performed by: NURSE PRACTITIONER

## 2022-11-29 PROCEDURE — 99213 OFFICE O/P EST LOW 20 MIN: CPT | Performed by: NURSE PRACTITIONER

## 2022-11-29 PROCEDURE — G8417 CALC BMI ABV UP PARAM F/U: HCPCS | Performed by: NURSE PRACTITIONER

## 2022-11-29 PROCEDURE — 1036F TOBACCO NON-USER: CPT | Performed by: NURSE PRACTITIONER

## 2022-11-29 PROCEDURE — G8427 DOCREV CUR MEDS BY ELIG CLIN: HCPCS | Performed by: NURSE PRACTITIONER

## 2022-11-29 PROCEDURE — 87804 INFLUENZA ASSAY W/OPTIC: CPT | Performed by: NURSE PRACTITIONER

## 2022-11-29 RX ORDER — OSELTAMIVIR PHOSPHATE 75 MG/1
75 CAPSULE ORAL 2 TIMES DAILY
Qty: 10 CAPSULE | Refills: 0 | Status: SHIPPED | OUTPATIENT
Start: 2022-11-29 | End: 2022-12-04

## 2022-11-29 ASSESSMENT — ENCOUNTER SYMPTOMS
SORE THROAT: 1
COUGH: 1
WHEEZING: 0
SHORTNESS OF BREATH: 0

## 2022-11-29 NOTE — TELEPHONE ENCOUNTER
Location of patient: [place]    Received call from [facility] with Red Flag Complaint. Subjective: Caller states \"URI\"     Current Symptoms:   Fatigue  Sore throat  Dry cough  HA    Daughter tested positive for Influenza x 10 days ago. Negative at home Covid test last night  Fully vaccinated for Covid, but no Influenza vaccine yet this year. Onset: 1 day ago; worsening    Pain Severity: Moderate    Temperature: Denies    What has been tried: DayQuil, NyQuil    Recommended disposition: See in Office Today or Tomorrow    Care advice provided, patient verbalizes understanding; denies any other questions or concerns; instructed to call back for any new or worsening symptoms. Patient/Caller agrees with recommended disposition; writer provided warm transfer to [facility] for appointment scheduling    Attention Provider: Thank you for allowing me to participate in the care of your patient. The patient was connected to triage in response to information provided to the ECC/PSC. Please do not respond through this encounter as the response is not directed to a shared pool.       Reason for Disposition   Patient wants to be seen    Protocols used: Cough-ADULT-[place]

## 2022-11-29 NOTE — PROGRESS NOTES
[id number]    TELEHEALTH EVALUATION -- Audio/Visual (During DQKMN-33 public health emergency)    HPI:    Amy Biswas. (:  1980) has requested an audio/video evaluation for the following concern(s):    Yesterday started with cough, body aches, chills, this morning sore throat on right side   Taking tylenol and dayquil,   Daughter Flu A 10 days ago. Review of Systems   Constitutional:  Negative for fever. HENT:  Positive for congestion and sore throat. Respiratory:  Positive for cough. Negative for shortness of breath and wheezing. Musculoskeletal:  Positive for myalgias. Neurological:  Negative for headaches. Prior to Visit Medications    Medication Sig Taking? Authorizing Provider   oseltamivir (TAMIFLU) 75 MG capsule Take 1 capsule by mouth 2 times daily for 5 days Yes Earsamantha Ends, APRPA - CNP   amphetamine-dextroamphetamine (ADDERALL, 30MG,) 30 MG tablet Take 1 tablet by mouth 2 times daily for 30 days.  Yes KUSUM Mendoza CNP   busPIRone (BUSPAR) 10 MG tablet TAKE 1 TABLET BY MOUTH THREE TIMES DAILY AS NEEDED FOR ANXIETY Yes Earna Ends, APRN - CNP   buPROPion (WELLBUTRIN SR) 200 MG extended release tablet Take 1 tablet by mouth twice daily Yes Earsamantha EndsKUSUM - CNP   SYNTHROID 300 MCG tablet Take 1 tablet by mouth once daily Yes Raul Smith MD   UBRELVY 100 MG TABS  Yes Historical Provider, MD   Multiple Vitamins-Minerals (THERAPEUTIC MULTIVITAMIN-MINERALS) tablet Take 1 tablet by mouth daily Yes Historical Provider, MD       Social History     Tobacco Use    Smoking status: Former     Packs/day: 0.50     Years: 17.00     Pack years: 8.50     Types: Cigarettes     Quit date: 2018     Years since quittin.8    Smokeless tobacco: Never   Vaping Use    Vaping Use: Never used   Substance Use Topics    Alcohol use: Yes     Comment: 1MTH    Drug use: No        Allergies   Allergen Reactions    Other Other (See Comments)     Hazelnuts, throat closes   ,   Past Medical History:   Diagnosis Date    Depression     Hemiplegic migraine 2020    Irritable     Nephrolith 2010    Schwannoma     Sleep apnea     uses c-pap    Thyroid disease     hypothyroid - chronic lymphocytic thyroiditis   ,   Past Surgical History:   Procedure Laterality Date    HERNIA REPAIR  2016    ventral    [place] LAP, IGNACIO RESTRICT PROC, LONGITUDINAL GASTRECTOMY N/A 10/08/2018    LAPAROSCOPIC SLEEVE GASTRECTOMY-ETHICON performed by Sussy Pillai MD at [address] PARTIAL  2015    UPPER GASTROINTESTINAL ENDOSCOPY  2018    VASECTOMY N/A     WISDOM TOOTH EXTRACTION     ,   Social History     Tobacco Use    Smoking status: Former     Packs/day: 0.50     Years: 17.00     Pack years: 8.50     Types: Cigarettes     Quit date: 2018     Years since quittin.8    Smokeless tobacco: Never   Vaping Use    Vaping Use: Never used   Substance Use Topics    Alcohol use: Yes     Comment: 1MTH    Drug use: No   ,   Family History   Problem Relation Age of Onset    Depression Mother     Arthritis Father         RA    Cancer Sister         thyroid    Depression Sister     Cancer Paternal Grandfather         lung    Cancer Paternal Aunt         lung       PHYSICAL EXAMINATION:  [ INSTRUCTIONS:  \"[x]\" Indicates a positive item  \"[]\" Indicates a negative item  -- DELETE ALL ITEMS NOT EXAMINED]  Vital Signs: (As obtained by patient/caregiver or practitioner observation)    Blood pressure-  Heart rate-    Respiratory rate-    Temperature-  Pulse oximetry-     Constitutional: [x] Appears well-developed and well-nourished [x] No apparent distress      [] Abnormal-   Mental status  [x] Alert and awake  [x] Oriented to person/place/time [x]Able to follow commands      Eyes:  EOM    []  Normal  [] Abnormal-  Sclera  [x]  Normal  [] Abnormal -         Discharge []  None visible  [] Abnormal -    HENT:   [x] Normocephalic, atraumatic.   [] Abnormal   [] Mouth/Throat: Mucous membranes are moist.     External Ears [] Normal  [] Abnormal-     Neck: [] No visualized mass     Pulmonary/Chest: [x] Respiratory effort normal.  [x] No visualized signs of difficulty breathing or respiratory distress        [] Abnormal-      Musculoskeletal:   [] Normal gait with no signs of ataxia         [x] Normal range of motion of neck        [] Abnormal-       Neurological:        [x] No Facial Asymmetry (Cranial nerve 7 motor function) (limited exam to video visit)          [x] No gaze palsy        [] Abnormal-         Skin:        [x] No significant exanthematous lesions or discoloration noted on facial skin         [] Abnormal-            Psychiatric:       [x] Normal Affect [x] No Hallucinations        [] Abnormal-     Other pertinent observable physical exam findings-     ASSESSMENT/PLAN:  1. Sore throat    - POCT Influenza A/B: influenza A negative, influenza B negative  - POC COVID-19: negative    2. Cough, unspecified type    - POCT Influenza A/B influenza A negative, influenza B negative  - POC COVID-19: negative    3. Exposure to the flu    - POCT Influenza A/B influenza A negative, influenza B negative  - oseltamivir (TAMIFLU) 75 MG capsule; Take 1 capsule by mouth 2 times daily for 5 days  Dispense: 10 capsule; Refill: 0    No follow-ups on file. Erik Rodríguez., was evaluated through a synchronous (real-time) audio-video encounter. The patient (or guardian if applicable) is aware that this is a billable service, which includes applicable co-pays. This Virtual Visit was conducted with patient's (and/or legal guardian's) consent. The visit was conducted pursuant to the emergency declaration under the [address] [place] [address], [address] [place] [address] authority and the [employer] General Act. Patient identification was verified, and a caregiver was present when appropriate.    The patient was located at [facility] Department): Magdi [facility],  Jori Jensen 19. Provider was located at [facility] (Appt Dept): Roelregis [facility],  [facility]. Total time spent on this encounter: Not billed by time    --KUSUM Goldman CNP on 11/29/2022 at 11:31 AM    An electronic signature was used to authenticate this note.

## 2022-12-16 DIAGNOSIS — F51.01 PRIMARY INSOMNIA: ICD-10-CM

## 2022-12-16 RX ORDER — ZOLPIDEM TARTRATE 5 MG/1
TABLET ORAL
Qty: 30 TABLET | Refills: 0 | Status: SHIPPED | OUTPATIENT
Start: 2022-12-16 | End: 2023-01-15

## 2022-12-16 NOTE — TELEPHONE ENCOUNTER
Last Office Visit  -  11/29/22  Next Office Visit  -  n/a    Last Filled  -  10/7/22  Last UDS -  9/7/21  Contract -  9/7/21

## 2022-12-22 DIAGNOSIS — E03.9 ACQUIRED HYPOTHYROIDISM: ICD-10-CM

## 2022-12-22 LAB
T3 TOTAL: 1.43 NG/ML (ref 0.8–2)
T4 FREE: 2.1 NG/DL (ref 0.9–1.8)
TSH REFLEX: 0.01 UIU/ML (ref 0.27–4.2)

## 2023-01-09 ENCOUNTER — OFFICE VISIT (OUTPATIENT)
Dept: ENDOCRINOLOGY | Age: 43
End: 2023-01-09
Payer: COMMERCIAL

## 2023-01-09 VITALS
WEIGHT: 240 LBS | TEMPERATURE: 98 F | DIASTOLIC BLOOD PRESSURE: 82 MMHG | BODY MASS INDEX: 29.84 KG/M2 | HEART RATE: 80 BPM | SYSTOLIC BLOOD PRESSURE: 122 MMHG | RESPIRATION RATE: 14 BRPM | HEIGHT: 75 IN

## 2023-01-09 DIAGNOSIS — E03.9 ACQUIRED HYPOTHYROIDISM: Primary | ICD-10-CM

## 2023-01-09 PROCEDURE — G8427 DOCREV CUR MEDS BY ELIG CLIN: HCPCS | Performed by: INTERNAL MEDICINE

## 2023-01-09 PROCEDURE — G8484 FLU IMMUNIZE NO ADMIN: HCPCS | Performed by: INTERNAL MEDICINE

## 2023-01-09 PROCEDURE — 99214 OFFICE O/P EST MOD 30 MIN: CPT | Performed by: INTERNAL MEDICINE

## 2023-01-09 PROCEDURE — 1036F TOBACCO NON-USER: CPT | Performed by: INTERNAL MEDICINE

## 2023-01-09 PROCEDURE — G8417 CALC BMI ABV UP PARAM F/U: HCPCS | Performed by: INTERNAL MEDICINE

## 2023-01-09 RX ORDER — TIRZEPATIDE 2.5 MG/.5ML
INJECTION, SOLUTION SUBCUTANEOUS
COMMUNITY
Start: 2022-11-09

## 2023-01-09 RX ORDER — LEVOTHYROXINE SODIUM 300 MCG
TABLET ORAL
Qty: 30 TABLET | Refills: 5 | Status: SHIPPED | OUTPATIENT
Start: 2023-01-09

## 2023-01-09 RX ORDER — TOPIRAMATE 50 MG/1
TABLET, FILM COATED ORAL
COMMUNITY
Start: 2022-12-29

## 2023-01-09 NOTE — PROGRESS NOTES
Subjective:      35 y/o WM who is here for thyroid evaluation. Referred by Charlotte Landa    Interim:    Seeing weight management at [address] [place] [address] Mt. 42 lbs on mounjaro    He was diagnosed with hypothyroidism years ago    Dx with Hashimoto's    H/o left lobectomy    Has been on different dosages    Currently taking levothyroxine 200+75  Dose changed on 9/21    Takes early in the morning, on empty stomach    9/21 TSH 6.76  FT4 1.5  6/21 TSH 10.34 FT4 1.4  2/21 TSH 6.11  8/20 TSH 28.06  4/19   Off medication  4/18 TSH 12.42  10/17 TSH 0.42    Initial complaints:see HPI  fatigue    History of radiation to patient's neck:  no  Family history includes thyroid issue  Family history of thyroid cancer: older sister thyroid cancer    12/21  TSH 5.94 FT4 1.4 FT3 3.3    Switched to Synthroid 300mcg daily    6/22 TSH 0.27  FT4 2.0 FT3 3.7  12/22 TSH 0.01 FT4 2.1      7/21 USG    FINDINGS:   Right thyroid lobe:  6.7 x 3.3 x 2.7 cm       Left thyroid lobe: Not measured       Isthmus:  9 mm       Thyroid Gland:  Status post left thyroidectomy. The right thyroid gland is   heterogeneously enlarged       Nodules: No thyroid nodules are present. Cervical lymphadenopathy: No abnormal lymph nodes in the imaged portions of   the neck. He has a h/o gastric sleeve    Mild.  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[place] [id number] and G    Past Medical History:   Diagnosis Date    Depression     Hemiplegic migraine 05/2020    Irritable     Nephrolith 05/2010    Schwannoma     Sleep apnea     uses c-pap    Thyroid disease     hypothyroid - chronic lymphocytic thyroiditis     Past Surgical History:   Procedure Laterality Date    HERNIA REPAIR  04/05/2016    ventral    [place] LAP, IGNACIO RESTRICT PROC, LONGITUDINAL GASTRECTOMY N/A 10/08/2018    LAPAROSCOPIC SLEEVE GASTRECTOMY-ETHICON performed by Kyler Fierro MD at [address] PARTIAL  03/2015    UPPER GASTROINTESTINAL ENDOSCOPY  05/11/2018    VASECTOMY N/A 2012    WISDOM TOOTH EXTRACTION       Current Outpatient Medications   Medication Sig Dispense Refill    zolpidem (AMBIEN) 5 MG tablet TAKE 1 TABLET BY MOUTH NIGHTLY AS NEEDED FOR SLEEP 30 tablet 0    amphetamine-dextroamphetamine (ADDERALL, 30MG,) 30 MG tablet Take 1 tablet by mouth 2 times daily for 30 days. 60 tablet 0    busPIRone (BUSPAR) 10 MG tablet TAKE 1 TABLET BY MOUTH THREE TIMES DAILY AS NEEDED FOR ANXIETY 270 tablet 0    buPROPion (WELLBUTRIN SR) 200 MG extended release tablet Take 1 tablet by mouth twice daily 60 tablet 5    SYNTHROID 300 MCG tablet Take 1 tablet by mouth once daily 30 tablet 5    UBRELVY 100 MG TABS       Multiple Vitamins-Minerals (THERAPEUTIC MULTIVITAMIN-MINERALS) tablet Take 1 tablet by mouth daily       No current facility-administered medications for this visit. Review of Systems  Please see scanned     Objective: There were no vitals taken for this visit. Wt Readings from Last 3 Encounters:   09/29/22 288 lb (130.6 kg)   07/28/22 287 lb (130.2 kg)   07/21/22 287 lb (130.2 kg)     Vitals:    01/09/23 1131   BP: 122/82   Pulse: 80   Resp: 14   Temp: 98 °F (36.7 °C)         Constitutional: Well-developed, appears stated age, cooperative, in no acute distress  H/E/N/M/T:atraumatic, normocephalic, external ears, nose, lips normal without lesions  Eyes: Lids, lashes, conjunctivae and sclerae normal, No proptosis, no redness  Neck: supple, symmetrical, no swelling  Skin: No obvious rashes or lesions present. Skin and hair texture normal  Psychiatric: Judgement and Insight:  judgement and insight appear normal  Neuro: Normal without focal findings, speech is normal normal, speech is spontaneous  Chest: No labored breathing, no chest deformity, no stridor  Musculoskeletal: No joint deformity, swelling      Lab Review  Lab Results   Component Value Date/Time    TSH 0.27 06/20/2022 10:16 AM     No results found for: FREET4      Assessment: 1. Hypothyroidism:  For years, not controlled. TSH has been fluctuating. Discussed possible causes. -ve  HAMA. Normal T3 level. May have some THR as TSH/T4 have been discordant sometimes. Repeat level showed low TSH, high FT4, will decrease dose. Advised requirements may change with weight loss  See sooner in 4 months    Plan: 1. Synthroid 300mcg daily and 150mcg every Sunday  2. TFT in 4 months

## 2023-01-17 DIAGNOSIS — F90.9 ATTENTION DEFICIT HYPERACTIVITY DISORDER (ADHD), UNSPECIFIED ADHD TYPE: ICD-10-CM

## 2023-01-17 DIAGNOSIS — F51.01 PRIMARY INSOMNIA: ICD-10-CM

## 2023-01-17 RX ORDER — DEXTROAMPHETAMINE SACCHARATE, AMPHETAMINE ASPARTATE, DEXTROAMPHETAMINE SULFATE AND AMPHETAMINE SULFATE 7.5; 7.5; 7.5; 7.5 MG/1; MG/1; MG/1; MG/1
30 TABLET ORAL 2 TIMES DAILY
Qty: 60 TABLET | Refills: 0 | Status: SHIPPED | OUTPATIENT
Start: 2023-01-17 | End: 2023-02-16

## 2023-01-17 NOTE — TELEPHONE ENCOUNTER
Last Office Visit  -  11/29/2022  Next Office Visit  -  n/a    Last Filled  -  11/22/2022  Last UDS -  09/07/2021  Contract -  n/a

## 2023-01-18 RX ORDER — ZOLPIDEM TARTRATE 5 MG/1
TABLET ORAL
Qty: 30 TABLET | Refills: 0 | Status: SHIPPED | OUTPATIENT
Start: 2023-01-18 | End: 2023-02-17

## 2023-02-23 DIAGNOSIS — F51.01 PRIMARY INSOMNIA: ICD-10-CM

## 2023-02-23 RX ORDER — ZOLPIDEM TARTRATE 5 MG/1
TABLET ORAL
Qty: 30 TABLET | Refills: 0 | Status: SHIPPED | OUTPATIENT
Start: 2023-02-23 | End: 2023-03-23

## 2023-02-23 NOTE — TELEPHONE ENCOUNTER
Last Office Visit  -  11/29/2022  Next Office Visit  -  n/a    Last Filled  -  01/18/2023  Last UDS -  09/07/2021  Contract -  09/09/2021

## 2023-03-28 DIAGNOSIS — F51.01 PRIMARY INSOMNIA: ICD-10-CM

## 2023-03-28 RX ORDER — LEVOTHYROXINE SODIUM 300 MCG
TABLET ORAL
Qty: 30 TABLET | Refills: 0 | Status: SHIPPED | OUTPATIENT
Start: 2023-03-28

## 2023-03-28 NOTE — TELEPHONE ENCOUNTER
Medication:   Requested Prescriptions     Pending Prescriptions Disp Refills    SYNTHROID 300 MCG tablet [Pharmacy Med Name: Synthroid 300 MCG Oral Tablet] 30 tablet 0     Sig: Take 1 tablet by mouth once daily       Last Filled:      Patient Phone Number: [phone] (home)     Last appt: 1/9/2023   Next appt: 5/8/2023    Last Thyroid:   Lab Results   Component Value Date/Time    TSH 0.27 06/20/2022 10:16 AM    FT3 3.7 06/20/2022 10:16 AM    S4QBXPQ 1.43 12/22/2022 08:39 AM    T4FREE 2.1 12/22/2022 08:39 AM    Q4WQCOA 8.7 10/11/2013 12:49 PM

## 2023-03-29 DIAGNOSIS — F90.9 ATTENTION DEFICIT HYPERACTIVITY DISORDER (ADHD), UNSPECIFIED ADHD TYPE: ICD-10-CM

## 2023-03-29 RX ORDER — ZOLPIDEM TARTRATE 5 MG/1
5 TABLET ORAL NIGHTLY PRN
Qty: 30 TABLET | Refills: 0 | Status: SHIPPED | OUTPATIENT
Start: 2023-03-29 | End: 2023-04-28

## 2023-03-29 NOTE — TELEPHONE ENCOUNTER
Last PDMP Vicente Vergara as Reviewed:  Review User Review Instant Review Result   RICARDO JAYSHREE WALLACE 3/29/2023  8:19 AM Reviewed PDMP [1]   -Checked PDMP today which shows last filled zolpidem 02/23/23.  Pt compliant with medications with no signs of misuse    Jayshree Osborn, [address] [facility]

## 2023-03-29 NOTE — TELEPHONE ENCOUNTER
Last Office Visit  -  11/29/2022  Next Office Visit  -  n/a    Last Filled  -  02/23/2023  Last UDS -  09/07/2021  Contract -  09/09/2021

## 2023-04-04 RX ORDER — DEXTROAMPHETAMINE SACCHARATE, AMPHETAMINE ASPARTATE, DEXTROAMPHETAMINE SULFATE AND AMPHETAMINE SULFATE 7.5; 7.5; 7.5; 7.5 MG/1; MG/1; MG/1; MG/1
30 TABLET ORAL 2 TIMES DAILY
Qty: 60 TABLET | Refills: 0 | Status: CANCELLED | OUTPATIENT
Start: 2023-04-04 | End: 2023-05-04

## 2023-04-06 ENCOUNTER — OFFICE VISIT (OUTPATIENT)
Dept: FAMILY MEDICINE CLINIC | Age: 43
End: 2023-04-06
Payer: COMMERCIAL

## 2023-04-06 VITALS
SYSTOLIC BLOOD PRESSURE: 106 MMHG | HEART RATE: 80 BPM | BODY MASS INDEX: 26.12 KG/M2 | OXYGEN SATURATION: 98 % | DIASTOLIC BLOOD PRESSURE: 76 MMHG | WEIGHT: 209 LBS

## 2023-04-06 DIAGNOSIS — Z00.00 PHYSICAL EXAM: Primary | ICD-10-CM

## 2023-04-06 DIAGNOSIS — Z00.00 PHYSICAL EXAM: ICD-10-CM

## 2023-04-06 DIAGNOSIS — E03.9 ACQUIRED HYPOTHYROIDISM: ICD-10-CM

## 2023-04-06 DIAGNOSIS — F90.9 ATTENTION DEFICIT HYPERACTIVITY DISORDER (ADHD), UNSPECIFIED ADHD TYPE: ICD-10-CM

## 2023-04-06 DIAGNOSIS — F41.9 ANXIETY: ICD-10-CM

## 2023-04-06 PROBLEM — D33.3 ACOUSTIC NEUROMA (HCC): Status: ACTIVE | Noted: 2022-08-12

## 2023-04-06 LAB
ALBUMIN SERPL-MCNC: 4.2 G/DL (ref 3.4–5)
ALBUMIN/GLOB SERPL: 1.8 {RATIO} (ref 1.1–2.2)
ALP SERPL-CCNC: 50 U/L (ref 40–129)
ALT SERPL-CCNC: 9 U/L (ref 10–40)
ANION GAP SERPL CALCULATED.3IONS-SCNC: 9 MMOL/L (ref 3–16)
AST SERPL-CCNC: 11 U/L (ref 15–37)
BASOPHILS # BLD: 0 K/UL (ref 0–0.2)
BASOPHILS NFR BLD: 0.4 %
BILIRUB SERPL-MCNC: 0.5 MG/DL (ref 0–1)
BUN SERPL-MCNC: 12 MG/DL (ref 7–20)
CALCIUM SERPL-MCNC: 9.2 MG/DL (ref 8.3–10.6)
CHLORIDE SERPL-SCNC: 106 MMOL/L (ref 99–110)
CHOLEST SERPL-MCNC: 128 MG/DL (ref 0–199)
CO2 SERPL-SCNC: 25 MMOL/L (ref 21–32)
CREAT SERPL-MCNC: 1 MG/DL (ref 0.9–1.3)
DEPRECATED RDW RBC AUTO: 13.6 % (ref 12.4–15.4)
EOSINOPHIL # BLD: 0.1 K/UL (ref 0–0.6)
EOSINOPHIL NFR BLD: 2.3 %
GFR SERPLBLD CREATININE-BSD FMLA CKD-EPI: >60 ML/MIN/{1.73_M2}
GLUCOSE SERPL-MCNC: 81 MG/DL (ref 70–99)
HCT VFR BLD AUTO: 45.2 % (ref 40.5–52.5)
HDLC SERPL-MCNC: 35 MG/DL (ref 40–60)
HGB BLD-MCNC: 14.9 G/DL (ref 13.5–17.5)
LDLC SERPL CALC-MCNC: 72 MG/DL
LYMPHOCYTES # BLD: 3 K/UL (ref 1–5.1)
LYMPHOCYTES NFR BLD: 54.4 %
MCH RBC QN AUTO: 28.3 PG (ref 26–34)
MCHC RBC AUTO-ENTMCNC: 32.9 G/DL (ref 31–36)
MCV RBC AUTO: 86.2 FL (ref 80–100)
MONOCYTES # BLD: 0.5 K/UL (ref 0–1.3)
MONOCYTES NFR BLD: 8.4 %
NEUTROPHILS # BLD: 1.9 K/UL (ref 1.7–7.7)
NEUTROPHILS NFR BLD: 34.5 %
PLATELET # BLD AUTO: 211 K/UL (ref 135–450)
PMV BLD AUTO: 9.5 FL (ref 5–10.5)
POTASSIUM SERPL-SCNC: 4.1 MMOL/L (ref 3.5–5.1)
PROT SERPL-MCNC: 6.6 G/DL (ref 6.4–8.2)
RBC # BLD AUTO: 5.24 M/UL (ref 4.2–5.9)
SODIUM SERPL-SCNC: 140 MMOL/L (ref 136–145)
T3FREE SERPL-MCNC: 3.5 PG/ML (ref 2.3–4.2)
T4 FREE SERPL-MCNC: 2.3 NG/DL (ref 0.9–1.8)
TRIGL SERPL-MCNC: 106 MG/DL (ref 0–150)
TSH SERPL DL<=0.005 MIU/L-ACNC: <0.01 UIU/ML (ref 0.27–4.2)
VLDLC SERPL CALC-MCNC: 21 MG/DL
WBC # BLD AUTO: 5.5 K/UL (ref 4–11)

## 2023-04-06 PROCEDURE — 99396 PREV VISIT EST AGE 40-64: CPT | Performed by: NURSE PRACTITIONER

## 2023-04-06 RX ORDER — BUSPIRONE HYDROCHLORIDE 15 MG/1
TABLET ORAL
Qty: 90 TABLET | Refills: 5 | Status: SHIPPED | OUTPATIENT
Start: 2023-04-06

## 2023-04-06 RX ORDER — DEXTROAMPHETAMINE SACCHARATE, AMPHETAMINE ASPARTATE, DEXTROAMPHETAMINE SULFATE AND AMPHETAMINE SULFATE 7.5; 7.5; 7.5; 7.5 MG/1; MG/1; MG/1; MG/1
30 TABLET ORAL 2 TIMES DAILY
Qty: 60 TABLET | Refills: 0 | Status: SHIPPED | OUTPATIENT
Start: 2023-04-06 | End: 2023-05-06

## 2023-04-06 RX ORDER — TIRZEPATIDE 7.5 MG/.5ML
INJECTION, SOLUTION SUBCUTANEOUS
COMMUNITY
Start: 2023-04-03

## 2023-04-06 ASSESSMENT — ENCOUNTER SYMPTOMS
WHEEZING: 0
SHORTNESS OF BREATH: 0
DIARRHEA: 0
SORE THROAT: 0
CONSTIPATION: 0

## 2023-04-06 ASSESSMENT — PATIENT HEALTH QUESTIONNAIRE - PHQ9
SUM OF ALL RESPONSES TO PHQ9 QUESTIONS 1 & 2: 0
SUM OF ALL RESPONSES TO PHQ QUESTIONS 1-9: 0
SUM OF ALL RESPONSES TO PHQ QUESTIONS 1-9: 0
1. LITTLE INTEREST OR PLEASURE IN DOING THINGS: 0
2. FEELING DOWN, DEPRESSED OR HOPELESS: 0
SUM OF ALL RESPONSES TO PHQ QUESTIONS 1-9: 0
SUM OF ALL RESPONSES TO PHQ QUESTIONS 1-9: 0

## 2023-04-06 NOTE — PROGRESS NOTES
Exam  Vitals and nursing note reviewed. Constitutional:       Appearance: Normal appearance. He is well-developed. HENT:      Head: Normocephalic and atraumatic. Right Ear: Tympanic membrane and external ear normal.      Left Ear: Tympanic membrane and external ear normal.      Nose: Nose normal.      Mouth/Throat:      Pharynx: No oropharyngeal exudate or posterior oropharyngeal erythema. Eyes:      Conjunctiva/sclera: Conjunctivae normal.   Cardiovascular:      Rate and Rhythm: Normal rate and regular rhythm. Heart sounds: Normal heart sounds. No murmur heard. Pulmonary:      Effort: Pulmonary effort is normal. No respiratory distress. Breath sounds: Normal breath sounds. No wheezing or rales. Abdominal:      General: Bowel sounds are normal. There is no distension. Palpations: Abdomen is soft. Tenderness: There is no abdominal tenderness. There is no rebound. Musculoskeletal:         General: Normal range of motion. Cervical back: Normal range of motion. Lymphadenopathy:      Cervical: No cervical adenopathy. Skin:     General: Skin is warm and dry. Neurological:      General: No focal deficit present. Mental Status: He is alert and oriented to person, place, and time. Deep Tendon Reflexes: Reflexes are normal and symmetric. Psychiatric:         Mood and Affect: Mood normal.         Behavior: Behavior normal.         Thought Content: Thought content normal.         Judgment: Judgment normal.       Assessment:  Encounter Diagnoses   Name Primary? Anxiety     Attention deficit hyperactivity disorder (ADHD), unspecified ADHD type     Physical exam Yes       Plan:  1. Anxiety  Increased buspar, continue wellbutrin 200mg BID    - busPIRone (BUSPAR) 15 MG tablet; TAKE 1 TABLET BY MOUTH THREE TIMES DAILY AS NEEDED FOR ANXIETY  Dispense: 90 tablet; Refill: 5    2.  Attention deficit hyperactivity disorder (ADHD), unspecified ADHD type  Stable- continue adderall

## 2023-04-07 LAB
EST. AVERAGE GLUCOSE BLD GHB EST-MCNC: 93.9 MG/DL
HBA1C MFR BLD: 4.9 %

## 2023-04-30 DIAGNOSIS — F51.01 PRIMARY INSOMNIA: ICD-10-CM

## 2023-05-01 RX ORDER — ZOLPIDEM TARTRATE 5 MG/1
TABLET ORAL
Qty: 30 TABLET | Refills: 0 | Status: SHIPPED | OUTPATIENT
Start: 2023-05-01 | End: 2023-05-31

## 2023-05-01 NOTE — TELEPHONE ENCOUNTER
Last Office Visit  -  04/06/2023  Next Office Visit  -  n/a    Last Filled  -  03/29/2023  Last UDS -  09/07/2021  Contract -  09/09/2021

## 2023-05-08 ENCOUNTER — OFFICE VISIT (OUTPATIENT)
Dept: ENDOCRINOLOGY | Age: 43
End: 2023-05-08
Payer: COMMERCIAL

## 2023-05-08 VITALS
WEIGHT: 204.4 LBS | HEART RATE: 91 BPM | HEIGHT: 75 IN | TEMPERATURE: 97 F | OXYGEN SATURATION: 98 % | SYSTOLIC BLOOD PRESSURE: 110 MMHG | RESPIRATION RATE: 15 BRPM | BODY MASS INDEX: 25.41 KG/M2 | DIASTOLIC BLOOD PRESSURE: 81 MMHG

## 2023-05-08 DIAGNOSIS — E03.9 ACQUIRED HYPOTHYROIDISM: Primary | ICD-10-CM

## 2023-05-08 PROCEDURE — 1036F TOBACCO NON-USER: CPT | Performed by: INTERNAL MEDICINE

## 2023-05-08 PROCEDURE — G8417 CALC BMI ABV UP PARAM F/U: HCPCS | Performed by: INTERNAL MEDICINE

## 2023-05-08 PROCEDURE — G8427 DOCREV CUR MEDS BY ELIG CLIN: HCPCS | Performed by: INTERNAL MEDICINE

## 2023-05-08 PROCEDURE — 99214 OFFICE O/P EST MOD 30 MIN: CPT | Performed by: INTERNAL MEDICINE

## 2023-05-08 RX ORDER — LEVOTHYROXINE SODIUM 200 MCG
200 TABLET ORAL DAILY
Qty: 30 TABLET | Refills: 5 | Status: SHIPPED | OUTPATIENT
Start: 2023-05-08 | End: 2024-05-02

## 2023-05-08 RX ORDER — LEVOTHYROXINE SODIUM 125 MCG
250 TABLET ORAL DAILY
Qty: 60 TABLET | Refills: 3 | Status: SHIPPED | OUTPATIENT
Start: 2023-05-08

## 2023-05-08 RX ORDER — LEVOTHYROXINE SODIUM 50 MCG
50 TABLET ORAL DAILY
Qty: 30 TABLET | Refills: 5 | Status: SHIPPED | OUTPATIENT
Start: 2023-05-08 | End: 2024-05-02

## 2023-05-08 NOTE — TELEPHONE ENCOUNTER
Pended as requested if appropriate. Resident aware of pt's blood sugar, advised to still give insulin for high K+.

## 2023-05-08 NOTE — PROGRESS NOTES
Subjective:      37 y/o WM who is here for thyroid evaluation. Referred by Parker Sanchez    Interim:  Losing weight  Stable    Seeing weight management at [address] [place] [address]. 42 +36 lbs on mounjaro    He was diagnosed with hypothyroidism years ago    Dx with Hashimoto's    H/o left lobectomy    Has been on different dosages    Currently taking levothyroxine 200+75  Dose changed on 9/21    Takes early in the morning, on empty stomach    9/21 TSH 6.76  FT4 1.5  6/21 TSH 10.34 FT4 1.4  2/21 TSH 6.11  8/20 TSH 28.06  4/19   Off medication  4/18 TSH 12.42  10/17 TSH 0.42    Initial complaints:see HPI  fatigue    History of radiation to patient's neck:  no  Family history includes thyroid issue  Family history of thyroid cancer: older sister thyroid cancer    12/21  TSH 5.94 FT4 1.4 FT3 3.3    Switched to Synthroid 300mcg daily    6/22 TSH 0.27  FT4 2.0 FT3 3.7  12/22 TSH 0.01 FT4 2.1    4/23 TSH <0.01 FT4 2.3  on 300mcg daily and 150mcg on Sunday but mostly still does fill    7/21 USG    FINDINGS:   Right thyroid lobe:  6.7 x 3.3 x 2.7 cm       Left thyroid lobe: Not measured       Isthmus:  9 mm       Thyroid Gland:  Status post left thyroidectomy. The right thyroid gland is   heterogeneously enlarged       Nodules: No thyroid nodules are present. Cervical lymphadenopathy: No abnormal lymph nodes in the imaged portions of   the neck. He has a h/o gastric sleeve    Mild.  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

## 2023-05-08 NOTE — TELEPHONE ENCOUNTER
Pt wife called in and requested we resend the rx of SYNTHROID 200 MCG tablet and the 50 MCG   As 125 MCG and take 2 instead of 200 and a 50 as that is double rx and paying double pt states    [facility] [address] [place] [address], [address] [place] [address]   [address], [address] [address]   Phone:  [phone]  Fax:  [phone]

## 2023-05-30 DIAGNOSIS — F90.9 ATTENTION DEFICIT HYPERACTIVITY DISORDER (ADHD), UNSPECIFIED ADHD TYPE: ICD-10-CM

## 2023-05-30 RX ORDER — DEXTROAMPHETAMINE SACCHARATE, AMPHETAMINE ASPARTATE, DEXTROAMPHETAMINE SULFATE AND AMPHETAMINE SULFATE 7.5; 7.5; 7.5; 7.5 MG/1; MG/1; MG/1; MG/1
30 TABLET ORAL 2 TIMES DAILY
Qty: 60 TABLET | Refills: 0 | Status: SHIPPED | OUTPATIENT
Start: 2023-05-30 | End: 2023-06-29

## 2023-05-30 NOTE — TELEPHONE ENCOUNTER
Last Office Visit  -  04/06/2023  Next Office Visit  -  n/a    Last Filled  -  04/06/2023  Last UDS -  09/07/2021  Contract -  09/09/2021

## 2023-06-05 ENCOUNTER — OFFICE VISIT (OUTPATIENT)
Dept: SURGERY | Age: 43
End: 2023-06-05
Payer: COMMERCIAL

## 2023-06-05 VITALS
HEART RATE: 85 BPM | WEIGHT: 207 LBS | DIASTOLIC BLOOD PRESSURE: 85 MMHG | HEIGHT: 74 IN | SYSTOLIC BLOOD PRESSURE: 127 MMHG | BODY MASS INDEX: 26.56 KG/M2 | TEMPERATURE: 98.2 F | OXYGEN SATURATION: 98 %

## 2023-06-05 DIAGNOSIS — N62 GYNECOMASTIA: ICD-10-CM

## 2023-06-05 DIAGNOSIS — M79.3 PANNICULITIS: Primary | ICD-10-CM

## 2023-06-05 PROCEDURE — 99204 OFFICE O/P NEW MOD 45 MIN: CPT | Performed by: SURGERY

## 2023-06-05 PROCEDURE — G8417 CALC BMI ABV UP PARAM F/U: HCPCS | Performed by: SURGERY

## 2023-06-05 PROCEDURE — G8427 DOCREV CUR MEDS BY ELIG CLIN: HCPCS | Performed by: SURGERY

## 2023-06-05 PROCEDURE — 1036F TOBACCO NON-USER: CPT | Performed by: SURGERY

## 2023-06-06 ENCOUNTER — TELEPHONE (OUTPATIENT)
Dept: SURGERY | Age: 43
End: 2023-06-06

## 2023-06-14 DIAGNOSIS — F51.01 PRIMARY INSOMNIA: ICD-10-CM

## 2023-06-15 RX ORDER — ZOLPIDEM TARTRATE 5 MG/1
TABLET ORAL
Qty: 30 TABLET | Refills: 0 | OUTPATIENT
Start: 2023-06-15 | End: 2023-07-15

## 2023-06-15 NOTE — TELEPHONE ENCOUNTER
Last Office Visit  -  04/06/2023  Next Office Visit  -  n/a    Last Filled  -    Last UDS -  09/27/2021  Contract -  09/09/2021

## 2023-06-19 RX ORDER — LEVOTHYROXINE SODIUM 0.12 MG/1
TABLET ORAL
Qty: 60 TABLET | Refills: 5 | Status: SHIPPED | OUTPATIENT
Start: 2023-06-19

## 2023-07-12 DIAGNOSIS — F90.9 ATTENTION DEFICIT HYPERACTIVITY DISORDER (ADHD), UNSPECIFIED ADHD TYPE: ICD-10-CM

## 2023-07-12 RX ORDER — DEXTROAMPHETAMINE SACCHARATE, AMPHETAMINE ASPARTATE, DEXTROAMPHETAMINE SULFATE AND AMPHETAMINE SULFATE 7.5; 7.5; 7.5; 7.5 MG/1; MG/1; MG/1; MG/1
30 TABLET ORAL 2 TIMES DAILY
Qty: 60 TABLET | Refills: 0 | Status: SHIPPED | OUTPATIENT
Start: 2023-07-12 | End: 2023-08-11

## 2023-07-12 NOTE — TELEPHONE ENCOUNTER
Last Office Visit  -  4/6/23  Next Office Visit  -      Last Filled  -  5/30/23  Last UDS -    Contract -

## 2023-07-22 DIAGNOSIS — F51.01 PRIMARY INSOMNIA: ICD-10-CM

## 2023-07-24 RX ORDER — ZOLPIDEM TARTRATE 5 MG/1
TABLET ORAL
Qty: 30 TABLET | Refills: 0 | Status: SHIPPED | OUTPATIENT
Start: 2023-07-24 | End: 2023-08-23

## 2023-07-24 NOTE — TELEPHONE ENCOUNTER
Last Office Visit  -  04/06/2023  Next Office Visit  -  n/a    Last Filled  -  06/12/2023  Last UDS -  09/07/2021  Contract -  09/09/2021

## 2023-08-09 ENCOUNTER — HOSPITAL ENCOUNTER (OUTPATIENT)
Dept: MRI IMAGING | Age: 43
Discharge: HOME | End: 2023-08-09
Attending: NEUROLOGICAL SURGERY
Payer: COMMERCIAL

## 2023-08-09 DIAGNOSIS — D33.3 AN (ACOUSTIC NEUROMA) (HCC): ICD-10-CM

## 2023-08-09 PROCEDURE — A9579 GAD-BASE MR CONTRAST NOS,1ML: HCPCS | Performed by: NEUROLOGICAL SURGERY

## 2023-08-09 PROCEDURE — 70553 MRI BRAIN STEM W/O & W/DYE: CPT

## 2023-08-09 PROCEDURE — 6360000004 HC RX CONTRAST MEDICATION: Performed by: NEUROLOGICAL SURGERY

## 2023-08-09 RX ADMIN — GADOTERIDOL 18 ML: 279.3 INJECTION, SOLUTION INTRAVENOUS at 09:24

## 2023-08-22 DIAGNOSIS — F51.01 PRIMARY INSOMNIA: ICD-10-CM

## 2023-08-23 RX ORDER — ZOLPIDEM TARTRATE 5 MG/1
TABLET ORAL
Qty: 30 TABLET | Refills: 0 | Status: SHIPPED | OUTPATIENT
Start: 2023-08-23 | End: 2023-09-22

## 2023-08-23 NOTE — TELEPHONE ENCOUNTER
LOV 4/6/2023    Future Appointments   Date Time Provider [address]   8/28/2023 11:50 AM MD Richelle Meza     Last filled 7/24/23    UDS 9/7/21    Med Contract 9/7/21

## 2023-08-25 DIAGNOSIS — E03.9 ACQUIRED HYPOTHYROIDISM: ICD-10-CM

## 2023-08-25 LAB
T3FREE SERPL-MCNC: 3.7 PG/ML (ref 2.3–4.2)
T4 FREE SERPL-MCNC: 2.3 NG/DL (ref 0.9–1.8)
TSH SERPL DL<=0.005 MIU/L-ACNC: 0.01 UIU/ML (ref 0.27–4.2)

## 2023-08-28 ENCOUNTER — OFFICE VISIT (OUTPATIENT)
Dept: ENDOCRINOLOGY | Age: 43
End: 2023-08-28
Payer: COMMERCIAL

## 2023-08-28 VITALS
HEART RATE: 86 BPM | OXYGEN SATURATION: 98 % | TEMPERATURE: 98 F | SYSTOLIC BLOOD PRESSURE: 120 MMHG | RESPIRATION RATE: 15 BRPM | BODY MASS INDEX: 25.54 KG/M2 | WEIGHT: 199 LBS | DIASTOLIC BLOOD PRESSURE: 78 MMHG | HEIGHT: 74 IN

## 2023-08-28 DIAGNOSIS — E03.9 ACQUIRED HYPOTHYROIDISM: Primary | ICD-10-CM

## 2023-08-28 PROCEDURE — G8417 CALC BMI ABV UP PARAM F/U: HCPCS | Performed by: INTERNAL MEDICINE

## 2023-08-28 PROCEDURE — 1036F TOBACCO NON-USER: CPT | Performed by: INTERNAL MEDICINE

## 2023-08-28 PROCEDURE — G8427 DOCREV CUR MEDS BY ELIG CLIN: HCPCS | Performed by: INTERNAL MEDICINE

## 2023-08-28 PROCEDURE — 99214 OFFICE O/P EST MOD 30 MIN: CPT | Performed by: INTERNAL MEDICINE

## 2023-08-28 RX ORDER — LEVOTHYROXINE SODIUM 0.2 MG/1
200 TABLET ORAL DAILY
Qty: 30 TABLET | Refills: 5 | Status: SHIPPED | OUTPATIENT
Start: 2023-08-28

## 2023-08-28 RX ORDER — LEVOTHYROXINE SODIUM 0.03 MG/1
TABLET ORAL
Qty: 30 TABLET | Refills: 5 | Status: SHIPPED | OUTPATIENT
Start: 2023-08-28

## 2023-08-28 NOTE — PROGRESS NOTES
Subjective:      37 y/o WM who is here for thyroid evaluation. Referred by Edith Hanson    Interim:  No  weight loss  Stable  Generic due to cost    Seeing weight management at [address] 42 +36 lbs on mounjaro    He was diagnosed with hypothyroidism years ago    Dx with Hashimoto's    H/o left lobectomy    Has been on different dosages    Currently taking levothyroxine 200+75  Dose changed on 9/21    Takes early in the morning, on empty stomach    9/21 TSH 6.76  FT4 1.5  6/21 TSH 10.34 FT4 1.4  2/21 TSH 6.11  8/20 TSH 28.06  4/19   Off medication  4/18 TSH 12.42  10/17 TSH 0.42    Initial complaints:see HPI  fatigue    History of radiation to patient's neck:  no  Family history includes thyroid issue  Family history of thyroid cancer: older sister thyroid cancer    12/21  TSH 5.94 FT4 1.4 FT3 3.3    Switched to Synthroid 300mcg daily    6/22 TSH 0.27  FT4 2.0 FT3 3.7  12/22 TSH 0.01 FT4 2.1    4/23 TSH <0.01 FT4 2.3  on 300mcg daily and 150mcg on Sunday but mostly still does fill    7/21 USG    FINDINGS:   Right thyroid lobe:  6.7 x 3.3 x 2.7 cm       Left thyroid lobe: Not measured       Isthmus:  9 mm       Thyroid Gland:  Status post left thyroidectomy. The right thyroid gland is   heterogeneously enlarged       Nodules: No thyroid nodules are present. Cervical lymphadenopathy: No abnormal lymph nodes in the imaged portions of   the neck. He has a h/o gastric sleeve    Mild.  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[place] and     Past Medical History:   Diagnosis Date    Depression     Hemiplegic migraine 05/2020    Irritable     Nephrolith 05/2010    Schwannoma     Sleep apnea     uses c-pap    Thyroid disease     hypothyroid - chronic lymphocytic thyroiditis     Past Surgical History:   Procedure Laterality Date    HERNIA REPAIR  04/05/2016    ventral    [place] LAPS GSTRC RSTRICTIV PX LONGITUDINAL GASTRECTOMY N/A 10/08/2018    LAPAROSCOPIC SLEEVE GASTRECTOMY-[employer]

## 2023-09-26 DIAGNOSIS — F51.01 PRIMARY INSOMNIA: ICD-10-CM

## 2023-09-26 DIAGNOSIS — F90.9 ATTENTION DEFICIT HYPERACTIVITY DISORDER (ADHD), UNSPECIFIED ADHD TYPE: ICD-10-CM

## 2023-09-26 RX ORDER — DEXTROAMPHETAMINE SACCHARATE, AMPHETAMINE ASPARTATE, DEXTROAMPHETAMINE SULFATE AND AMPHETAMINE SULFATE 7.5; 7.5; 7.5; 7.5 MG/1; MG/1; MG/1; MG/1
30 TABLET ORAL 2 TIMES DAILY
Qty: 60 TABLET | Refills: 0 | Status: SHIPPED | OUTPATIENT
Start: 2023-09-26 | End: 2023-10-26

## 2023-09-26 RX ORDER — ZOLPIDEM TARTRATE 5 MG/1
TABLET ORAL
Qty: 30 TABLET | Refills: 0 | Status: SHIPPED | OUTPATIENT
Start: 2023-09-26 | End: 2023-10-26

## 2023-09-26 NOTE — TELEPHONE ENCOUNTER
Last Office Visit  -  04/06/2023  Next Office Visit  -  n/a    Last Filled  -  08/23/2023  Last UDS -  09/07/2021  Contract -  09/09/2021

## 2023-09-26 NOTE — TELEPHONE ENCOUNTER
LMOM for pt to call back to schedule. Looks like pt did not get a refill last month. Pt need to do in office follow up?

## 2023-10-09 NOTE — TELEPHONE ENCOUNTER
Refill Request     Last Seen: 11/29/2022    Last Written: 12/16/22    Next Appointment:   Future Appointments   Date Time Provider Estefani Crump   5/8/2023 10:50 AM MD Radha Wilde             Requested Prescriptions     Pending Prescriptions Disp Refills    zolpidem (AMBIEN) 5 MG tablet [Pharmacy Med Name: Zolpidem Tartrate 5 MG Oral Tablet] 30 tablet 0     Sig: TAKE 1 TABLET BY MOUTH NIGHTLY AS NEEDED FOR SLEEP Wartpeel Counseling:  I discussed with the patient the risks of Wartpeel including but not limited to erythema, scaling, itching, weeping, crusting, and pain.

## 2023-10-18 SDOH — ECONOMIC STABILITY: FOOD INSECURITY: WITHIN THE PAST 12 MONTHS, YOU WORRIED THAT YOUR FOOD WOULD RUN OUT BEFORE YOU GOT MONEY TO BUY MORE.: NEVER TRUE

## 2023-10-18 SDOH — ECONOMIC STABILITY: INCOME INSECURITY: HOW HARD IS IT FOR YOU TO PAY FOR THE VERY BASICS LIKE FOOD, HOUSING, MEDICAL CARE, AND HEATING?: NOT HARD AT ALL

## 2023-10-18 SDOH — ECONOMIC STABILITY: TRANSPORTATION INSECURITY
IN THE PAST 12 MONTHS, HAS LACK OF TRANSPORTATION KEPT YOU FROM MEETINGS, WORK, OR FROM GETTING THINGS NEEDED FOR DAILY LIVING?: NO

## 2023-10-18 SDOH — ECONOMIC STABILITY: HOUSING INSECURITY
IN THE LAST 12 MONTHS, WAS THERE A TIME WHEN YOU DID NOT HAVE A STEADY PLACE TO SLEEP OR SLEPT IN A SHELTER (INCLUDING NOW)?: NO

## 2023-10-18 SDOH — ECONOMIC STABILITY: FOOD INSECURITY: WITHIN THE PAST 12 MONTHS, THE FOOD YOU BOUGHT JUST DIDN'T LAST AND YOU DIDN'T HAVE MONEY TO GET MORE.: NEVER TRUE

## 2023-10-19 ENCOUNTER — OFFICE VISIT (OUTPATIENT)
Dept: FAMILY MEDICINE CLINIC | Age: 43
End: 2023-10-19
Payer: COMMERCIAL

## 2023-10-19 VITALS
WEIGHT: 199 LBS | OXYGEN SATURATION: 97 % | DIASTOLIC BLOOD PRESSURE: 86 MMHG | BODY MASS INDEX: 25.55 KG/M2 | HEART RATE: 85 BPM | SYSTOLIC BLOOD PRESSURE: 110 MMHG

## 2023-10-19 DIAGNOSIS — F51.01 PRIMARY INSOMNIA: ICD-10-CM

## 2023-10-19 DIAGNOSIS — B37.9 YEAST INFECTION: ICD-10-CM

## 2023-10-19 DIAGNOSIS — F90.9 ATTENTION DEFICIT HYPERACTIVITY DISORDER (ADHD), UNSPECIFIED ADHD TYPE: Primary | ICD-10-CM

## 2023-10-19 DIAGNOSIS — Z23 NEED FOR INFLUENZA VACCINATION: ICD-10-CM

## 2023-10-19 LAB — ANNOTATION COMMENT IMP: NORMAL

## 2023-10-19 PROCEDURE — G8427 DOCREV CUR MEDS BY ELIG CLIN: HCPCS | Performed by: NURSE PRACTITIONER

## 2023-10-19 PROCEDURE — 90471 IMMUNIZATION ADMIN: CPT | Performed by: NURSE PRACTITIONER

## 2023-10-19 PROCEDURE — G8417 CALC BMI ABV UP PARAM F/U: HCPCS | Performed by: NURSE PRACTITIONER

## 2023-10-19 PROCEDURE — 99214 OFFICE O/P EST MOD 30 MIN: CPT | Performed by: NURSE PRACTITIONER

## 2023-10-19 PROCEDURE — 1036F TOBACCO NON-USER: CPT | Performed by: NURSE PRACTITIONER

## 2023-10-19 PROCEDURE — G8482 FLU IMMUNIZE ORDER/ADMIN: HCPCS | Performed by: NURSE PRACTITIONER

## 2023-10-19 PROCEDURE — 90674 CCIIV4 VAC NO PRSV 0.5 ML IM: CPT | Performed by: NURSE PRACTITIONER

## 2023-10-19 RX ORDER — NYSTATIN 100000 U/G
OINTMENT TOPICAL
Qty: 30 G | Refills: 2 | Status: SHIPPED | OUTPATIENT
Start: 2023-10-19

## 2023-10-19 RX ORDER — DEXTROAMPHETAMINE SACCHARATE, AMPHETAMINE ASPARTATE, DEXTROAMPHETAMINE SULFATE AND AMPHETAMINE SULFATE 7.5; 7.5; 7.5; 7.5 MG/1; MG/1; MG/1; MG/1
30 TABLET ORAL 2 TIMES DAILY
Qty: 60 TABLET | Refills: 0 | Status: SHIPPED | OUTPATIENT
Start: 2023-10-26 | End: 2023-11-25

## 2023-10-19 RX ORDER — ZOLPIDEM TARTRATE 5 MG/1
TABLET ORAL
Qty: 30 TABLET | Refills: 0 | Status: SHIPPED | OUTPATIENT
Start: 2023-10-26 | End: 2023-11-26

## 2023-10-19 ASSESSMENT — ENCOUNTER SYMPTOMS
SHORTNESS OF BREATH: 0
WHEEZING: 0

## 2023-10-19 NOTE — PROGRESS NOTES
Patient: Delmi Guerra is a 39 y.o. male who presents today with the following Chief Complaint(s):  Chief Complaint   Patient presents with    Medication Refill         HPI  Left ankle dx with gout- had pain 2-3 weeks ago- went [facility]- took steroids- helped the swelling- still some residual pain. 2-3/10    ADHD: adderall 30mg BID- tolerating well- able to concentrate and no trouble with sleeping  [facility] weight management: 7.5mg mounjaro- stable dose- doing well on this    Rash under skin of abdomen- seems to be more when he has working out- gets itchy    Insomnia: ambien 5mg nightly PRN- stable   Current Outpatient Medications   Medication Sig Dispense Refill    [START ON 10/26/2023] amphetamine-dextroamphetamine (ADDERALL, 30MG,) 30 MG tablet Take 1 tablet by mouth 2 times daily for 30 days. 60 tablet 0    [START ON 10/26/2023] zolpidem (AMBIEN) 5 MG tablet TAKE 1 TABLET BY MOUTH NIGHTLY AS NEEDED FOR SLEEP . DO NOT EXCEED 1 PER 24 HOURS 30 tablet 0    nystatin (MYCOSTATIN) 227723 UNIT/GM ointment Apply topically 2 times daily. 30 g 2    levothyroxine (SYNTHROID) 200 MCG tablet Take 1 tablet by mouth daily 30 tablet 5    levothyroxine (SYNTHROID) 25 MCG tablet In addition to 200mcg 30 tablet 5    MOUNJARO 7.5 MG/0.5ML SOPN SC injection       busPIRone (BUSPAR) 15 MG tablet TAKE 1 TABLET BY MOUTH THREE TIMES DAILY AS NEEDED FOR ANXIETY 90 tablet 5    buPROPion (WELLBUTRIN SR) 200 MG extended release tablet Take 1 tablet by mouth twice daily 180 tablet 3    topiramate (TOPAMAX) 50 MG tablet 1 tablet 2 times daily      UBRELVY 100 MG TABS       Multiple Vitamins-Minerals (THERAPEUTIC MULTIVITAMIN-MINERALS) tablet Take 1 tablet by mouth daily       No current facility-administered medications for this visit. Patient's past medical history, surgical history, family history, medications,  andallergies  were all reviewed and updated as appropriate today.       Review of Systems   Constitutional:

## 2023-10-23 LAB
6MAM UR QL: NOT DETECTED
7AMINOCLONAZEPAM UR QL: NOT DETECTED
A-OH ALPRAZ UR QL: NOT DETECTED
ALPHA-OH-MIDAZOLAM, URINE: NOT DETECTED
ALPRAZ UR QL: NOT DETECTED
AMPHET UR QL SCN: PRESENT
ANNOTATION COMMENT IMP: NORMAL
ANNOTATION COMMENT IMP: NORMAL
BARBITURATES UR QL: NOT DETECTED
BUPRENORPHINE UR QL: NOT DETECTED
BZE UR QL: NOT DETECTED
CARBOXYTHC UR QL: NOT DETECTED
CARISOPRODOL UR QL: NOT DETECTED
CLONAZEPAM UR QL: NOT DETECTED
CODEINE UR QL: NOT DETECTED
CREAT UR-MCNC: 48.7 MG/DL (ref 20–400)
DIAZEPAM UR QL: NOT DETECTED
ETHYL GLUCURONIDE UR QL: NOT DETECTED
FENTANYL UR QL: NOT DETECTED
GABAPENTIN: NOT DETECTED
HYDROCODONE UR QL: NOT DETECTED
HYDROMORPHONE UR QL: NOT DETECTED
LORAZEPAM UR QL: NOT DETECTED
MDA UR QL: NOT DETECTED
MDEA UR QL: NOT DETECTED
MDMA UR QL: NOT DETECTED
MEPERIDINE UR QL: NOT DETECTED
METHADONE UR QL: NOT DETECTED
METHAMPHET UR QL: NOT DETECTED
MIDAZOLAM UR QL SCN: NOT DETECTED
MORPHINE UR QL: NOT DETECTED
NALOXONE: NOT DETECTED
NORBUPRENORPHINE UR QL CFM: NOT DETECTED
NORDIAZEPAM UR QL: NOT DETECTED
NORFENTANYL UR QL: NOT DETECTED
NORHYDROCODONE UR QL CFM: NOT DETECTED
NOROXYCODONE UR QL CFM: NOT DETECTED
NOROXYMORPHONE, URINE: NOT DETECTED
OXAZEPAM UR QL: NOT DETECTED
OXYCODONE UR QL: NOT DETECTED
OXYMORPHONE UR QL: NOT DETECTED
PATHOLOGY STUDY: NORMAL
PCP UR QL: NOT DETECTED
PHENTERMINE UR QL: NOT DETECTED
PPAA UR QL: NOT DETECTED
PREGABALIN: NOT DETECTED
SERVICE CMNT-IMP: NORMAL
TAPENTADOL UR QL SCN: NOT DETECTED
TAPENTADOL-O-SULFATE, URINE: NOT DETECTED
TEMAZEPAM UR QL: NOT DETECTED
TRAMADOL UR QL: NOT DETECTED
ZOLPIDEM UR QL: NOT DETECTED

## 2023-12-05 DIAGNOSIS — F90.9 ATTENTION DEFICIT HYPERACTIVITY DISORDER (ADHD), UNSPECIFIED ADHD TYPE: ICD-10-CM

## 2023-12-05 DIAGNOSIS — F51.01 PRIMARY INSOMNIA: ICD-10-CM

## 2023-12-05 RX ORDER — ZOLPIDEM TARTRATE 5 MG/1
TABLET ORAL
Qty: 30 TABLET | Refills: 0 | Status: SHIPPED | OUTPATIENT
Start: 2023-12-05 | End: 2024-01-04

## 2023-12-05 RX ORDER — DEXTROAMPHETAMINE SACCHARATE, AMPHETAMINE ASPARTATE, DEXTROAMPHETAMINE SULFATE AND AMPHETAMINE SULFATE 7.5; 7.5; 7.5; 7.5 MG/1; MG/1; MG/1; MG/1
30 TABLET ORAL 2 TIMES DAILY
Qty: 60 TABLET | Refills: 0 | Status: SHIPPED | OUTPATIENT
Start: 2023-12-05 | End: 2024-01-04

## 2023-12-05 NOTE — TELEPHONE ENCOUNTER
Last Office Visit  -  10/19/23  Next Office Visit  -  n/a    Last Filled  -  10/26/23  Last UDS -  10/19/23  Contract -  9/9/21

## 2023-12-21 ENCOUNTER — TELEPHONE (OUTPATIENT)
Dept: FAMILY MEDICINE CLINIC | Age: 43
End: 2023-12-21

## 2024-01-13 DIAGNOSIS — F41.9 ANXIETY: ICD-10-CM

## 2024-01-14 RX ORDER — BUSPIRONE HYDROCHLORIDE 15 MG/1
TABLET ORAL
Qty: 90 TABLET | Refills: 11 | Status: SHIPPED | OUTPATIENT
Start: 2024-01-14

## 2024-01-26 ENCOUNTER — PATIENT MESSAGE (OUTPATIENT)
Dept: ENDOCRINOLOGY | Age: 44
End: 2024-01-26

## 2024-01-29 ENCOUNTER — OFFICE VISIT (OUTPATIENT)
Dept: ENDOCRINOLOGY | Age: 44
End: 2024-01-29
Payer: COMMERCIAL

## 2024-01-29 VITALS
SYSTOLIC BLOOD PRESSURE: 121 MMHG | BODY MASS INDEX: 26.18 KG/M2 | WEIGHT: 204 LBS | DIASTOLIC BLOOD PRESSURE: 80 MMHG | HEIGHT: 74 IN | HEART RATE: 86 BPM

## 2024-01-29 DIAGNOSIS — E03.9 ACQUIRED HYPOTHYROIDISM: Primary | ICD-10-CM

## 2024-01-29 DIAGNOSIS — E03.9 ACQUIRED HYPOTHYROIDISM: ICD-10-CM

## 2024-01-29 LAB
T3 SERPL-MCNC: 0.79 NG/ML (ref 0.8–2)
T3FREE SERPL-MCNC: 2.4 PG/ML (ref 2.3–4.2)
T4 FREE SERPL-MCNC: 1.5 NG/DL (ref 0.9–1.8)
TSH SERPL DL<=0.005 MIU/L-ACNC: 0.18 UIU/ML (ref 0.27–4.2)

## 2024-01-29 PROCEDURE — G8427 DOCREV CUR MEDS BY ELIG CLIN: HCPCS | Performed by: INTERNAL MEDICINE

## 2024-01-29 PROCEDURE — G8482 FLU IMMUNIZE ORDER/ADMIN: HCPCS | Performed by: INTERNAL MEDICINE

## 2024-01-29 PROCEDURE — G8417 CALC BMI ABV UP PARAM F/U: HCPCS | Performed by: INTERNAL MEDICINE

## 2024-01-29 PROCEDURE — 1036F TOBACCO NON-USER: CPT | Performed by: INTERNAL MEDICINE

## 2024-01-29 PROCEDURE — 99213 OFFICE O/P EST LOW 20 MIN: CPT | Performed by: INTERNAL MEDICINE

## 2024-01-29 NOTE — TELEPHONE ENCOUNTER
From: Juan Antonio Patel Jr.  To: Dr. Keith Calles  Sent: 1/26/2024 1:50 PM EST  Subject: Bloodwork Test Order Dates    I went to have the bloodwork completed today for my appointment with Dr. Calles on this upcoming Monday and the [facility] lab said that the way the orders are written in the system, they cannot do the blood draw until February 28th. If I need to have bloodwork completed before my appointment Monday afternoon, then there will need to be a change to the orders or new ones entered in.    Thank you,  Isra

## 2024-01-29 NOTE — PROGRESS NOTES
Subjective:      43 y/o WM who is here for thyroid evaluation.     Referred by Bernie Mello    Interim:  No  weight loss  Stable  Generic due to cost  Labs pending    Seeing weight management at [facility]  Lost 42 +36 lbs on mounjaro    He was diagnosed with hypothyroidism years ago    Dx with Hashimoto's    H/o left lobectomy    Has been on different dosages    Currently taking levothyroxine 200+75  Dose changed on 9/21    Takes early in the morning, on empty stomach    9/21 TSH 6.76  FT4 1.5  6/21 TSH 10.34 FT4 1.4  2/21 TSH 6.11  8/20 TSH 28.06  4/19   Off medication  4/18 TSH 12.42  10/17 TSH 0.42    Initial complaints:see HPI  fatigue    History of radiation to patient's neck:  no  Family history includes thyroid issue  Family history of thyroid cancer: older sister thyroid cancer    12/21  TSH 5.94 FT4 1.4 FT3 3.3    Switched to Synthroid 300mcg daily    6/22 TSH 0.27  FT4 2.0 FT3 3.7  12/22 TSH 0.01 FT4 2.1    4/23 TSH <0.01 FT4 2.3  on 300mcg daily and 150mcg on Sunday but mostly still does fill    8/23 TSH 0.01  dose changed to 225mcg    7/21 USG    FINDINGS:   Right thyroid lobe:  6.7 x 3.3 x 2.7 cm       Left thyroid lobe:  Not measured       Isthmus:  9 mm       Thyroid Gland:  Status post left thyroidectomy.  The right thyroid gland is   heterogeneously enlarged       Nodules: No thyroid nodules are present.       Cervical lymphadenopathy: No abnormal lymph nodes in the imaged portions of   the neck.     He has a h/o gastric sleeve    Mild. 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

## 2024-01-30 DIAGNOSIS — F51.01 PRIMARY INSOMNIA: ICD-10-CM

## 2024-01-30 DIAGNOSIS — F90.9 ATTENTION DEFICIT HYPERACTIVITY DISORDER (ADHD), UNSPECIFIED ADHD TYPE: ICD-10-CM

## 2024-01-30 RX ORDER — DEXTROAMPHETAMINE SACCHARATE, AMPHETAMINE ASPARTATE, DEXTROAMPHETAMINE SULFATE AND AMPHETAMINE SULFATE 7.5; 7.5; 7.5; 7.5 MG/1; MG/1; MG/1; MG/1
30 TABLET ORAL 2 TIMES DAILY
Qty: 60 TABLET | Refills: 0 | Status: SHIPPED | OUTPATIENT
Start: 2024-01-30 | End: 2024-02-29

## 2024-01-30 RX ORDER — ZOLPIDEM TARTRATE 5 MG/1
TABLET ORAL
Qty: 30 TABLET | Refills: 0 | Status: SHIPPED | OUTPATIENT
Start: 2024-01-30 | End: 2024-02-29

## 2024-02-12 RX ORDER — BUPROPION HYDROCHLORIDE 200 MG/1
TABLET, EXTENDED RELEASE ORAL
Qty: 180 TABLET | Refills: 3 | Status: SHIPPED | OUTPATIENT
Start: 2024-02-12

## 2024-03-03 DIAGNOSIS — E03.9 ACQUIRED HYPOTHYROIDISM: Primary | ICD-10-CM

## 2024-03-04 RX ORDER — LEVOTHYROXINE SODIUM 0.03 MG/1
TABLET ORAL
Qty: 30 TABLET | Refills: 0 | Status: SHIPPED | OUTPATIENT
Start: 2024-03-04

## 2024-03-04 RX ORDER — LEVOTHYROXINE SODIUM 0.2 MG/1
200 TABLET ORAL DAILY
Qty: 30 TABLET | Refills: 0 | Status: SHIPPED | OUTPATIENT
Start: 2024-03-04

## 2024-03-04 NOTE — TELEPHONE ENCOUNTER
Requested Prescriptions     Pending Prescriptions Disp Refills    levothyroxine (SYNTHROID) 200 MCG tablet [Pharmacy Med Name: Levothyroxine Sodium Oral Tablet 200 MCG] 30 tablet 0     Sig: TAKE 1 TABLET BY MOUTH EVERY DAY       [id number] - [place], [place] - [address] - P [phone] - F [phone]  [address]   [facility] [address]  Phone: [phone] Fax: [phone]    Last OV 01/29/2024  Next OV 06/23/2024      Plan:      1.Levothyroxine  225mcg  2.TFT in 4 months

## 2024-03-04 NOTE — TELEPHONE ENCOUNTER
Medication:   Requested Prescriptions     Pending Prescriptions Disp Refills    levothyroxine (SYNTHROID) 25 MCG tablet [Pharmacy Med Name: Levothyroxine Sodium Oral Tablet 25 MCG] 30 tablet 0     Sig: TAKE 1 TABLET BY MOUTH EVERY DAY. TAKE IN ADDITION TO 200MCG TABLET       Last Filled:      Patient Phone Number: [phone] (home)     Last appt: 1/29/2024   Next appt: 3/3/2024    Last Thyroid:   Lab Results   Component Value Date/Time    TSH 0.27 06/20/2022 10:16 AM    FT3 2.4 01/29/2024 12:41 PM    C1VPSPU 0.79 01/29/2024 12:41 PM    T4FREE 1.5 01/29/2024 12:41 PM    Z1BXCTA 8.7 10/11/2013 12:49 PM

## 2024-04-03 DIAGNOSIS — F90.9 ATTENTION DEFICIT HYPERACTIVITY DISORDER (ADHD), UNSPECIFIED ADHD TYPE: ICD-10-CM

## 2024-04-03 DIAGNOSIS — E03.9 ACQUIRED HYPOTHYROIDISM: ICD-10-CM

## 2024-04-03 RX ORDER — LEVOTHYROXINE SODIUM 0.2 MG/1
200 TABLET ORAL DAILY
Qty: 30 TABLET | Refills: 0 | Status: SHIPPED | OUTPATIENT
Start: 2024-04-03

## 2024-04-03 RX ORDER — LEVOTHYROXINE SODIUM 0.03 MG/1
TABLET ORAL
Qty: 30 TABLET | Refills: 0 | Status: SHIPPED | OUTPATIENT
Start: 2024-04-03

## 2024-04-03 RX ORDER — DEXTROAMPHETAMINE SACCHARATE, AMPHETAMINE ASPARTATE, DEXTROAMPHETAMINE SULFATE AND AMPHETAMINE SULFATE 7.5; 7.5; 7.5; 7.5 MG/1; MG/1; MG/1; MG/1
30 TABLET ORAL 2 TIMES DAILY
Qty: 60 TABLET | Refills: 0 | Status: SHIPPED | OUTPATIENT
Start: 2024-04-03 | End: 2024-05-03

## 2024-04-03 NOTE — TELEPHONE ENCOUNTER
Last Office Visit  -  12/21/23  Next Office Visit  -  n/a    Last Filled  -  1/30/24  Last UDS -  10/19/23  Contract -  9/9/21    Left message for pt to call back to schedule appt

## 2024-04-03 NOTE — TELEPHONE ENCOUNTER
Medication:   Requested Prescriptions     Pending Prescriptions Disp Refills    levothyroxine (SYNTHROID) 25 MCG tablet [Pharmacy Med Name: Levothyroxine Sodium Oral Tablet 25 MCG] 30 tablet 0     Sig: TAKE 1 TABLET BY MOUTH EVERY DAY. TAKE IN ADDITION TO 200MCG TABLET    levothyroxine (SYNTHROID) 200 MCG tablet [Pharmacy Med Name: Levothyroxine Sodium Oral Tablet 200 MCG] 30 tablet 0     Sig: TAKE 1 TABLET BY MOUTH EVERY DAY       Last Filled:      Patient Phone Number: [phone] (home)     Last appt: 1/29/2024   Next appt: 6/3/2024    Last Thyroid:   Lab Results   Component Value Date/Time    TSH 0.27 06/20/2022 10:16 AM    FT3 2.4 01/29/2024 12:41 PM    Q3MXJUM 0.79 01/29/2024 12:41 PM    T4FREE 1.5 01/29/2024 12:41 PM    S7HENMV 8.7 10/11/2013 12:49 PM

## 2024-04-06 DIAGNOSIS — E03.9 ACQUIRED HYPOTHYROIDISM: ICD-10-CM

## 2024-04-08 RX ORDER — LEVOTHYROXINE SODIUM 0.2 MG/1
200 TABLET ORAL DAILY
Qty: 30 TABLET | Refills: 3 | Status: SHIPPED | OUTPATIENT
Start: 2024-04-08

## 2024-04-08 RX ORDER — LEVOTHYROXINE SODIUM 0.2 MG/1
200 TABLET ORAL DAILY
Qty: 30 TABLET | Refills: 0 | OUTPATIENT
Start: 2024-04-08

## 2024-04-08 NOTE — TELEPHONE ENCOUNTER
Medication:   Requested Prescriptions     Pending Prescriptions Disp Refills    levothyroxine (SYNTHROID) 200 MCG tablet [Pharmacy Med Name: Levothyroxine Sodium Oral Tablet 200 MCG] 30 tablet 3     Sig: TAKE 1 TABLET BY MOUTH EVERY DAY       Last Filled:      Patient Phone Number: [phone] (home)     Last appt: 1/29/2024   Next appt: 4/6/2024    Last Thyroid:   Lab Results   Component Value Date/Time    TSH 0.27 06/20/2022 10:16 AM    FT3 2.4 01/29/2024 12:41 PM    G3LNILS 0.79 01/29/2024 12:41 PM    T4FREE 1.5 01/29/2024 12:41 PM    V6JSVLJ 8.7 10/11/2013 12:49 PM

## 2024-05-06 RX ORDER — LEVOTHYROXINE SODIUM 0.03 MG/1
TABLET ORAL
Qty: 30 TABLET | Refills: 3 | Status: SHIPPED | OUTPATIENT
Start: 2024-05-06

## 2024-05-06 NOTE — TELEPHONE ENCOUNTER
Medication:   Requested Prescriptions     Pending Prescriptions Disp Refills    levothyroxine (SYNTHROID) 25 MCG tablet [Pharmacy Med Name: Levothyroxine Sodium Oral Tablet 25 MCG] 30 tablet 3     Sig: TAKE 1 TABLET BY MOUTH EVERY DAY. TAKE IN ADDITION TO 200MCG TABLET       Last Filled:      Patient Phone Number: [phone] (home)     Last appt: 1/29/2024   Next appt: 6/3/2024    Last Thyroid:   Lab Results   Component Value Date/Time    TSH 0.18 01/29/2024 12:41 PM    FT3 2.4 01/29/2024 12:41 PM    T6KJVND 0.79 01/29/2024 12:41 PM    T4FREE 1.5 01/29/2024 12:41 PM    Y0PMXTM 8.7 10/11/2013 12:49 PM

## 2024-05-23 ENCOUNTER — OFFICE VISIT (OUTPATIENT)
Dept: FAMILY MEDICINE CLINIC | Age: 44
End: 2024-05-23
Payer: COMMERCIAL

## 2024-05-23 VITALS
BODY MASS INDEX: 26.58 KG/M2 | WEIGHT: 207 LBS | SYSTOLIC BLOOD PRESSURE: 120 MMHG | HEART RATE: 88 BPM | DIASTOLIC BLOOD PRESSURE: 78 MMHG | OXYGEN SATURATION: 97 %

## 2024-05-23 DIAGNOSIS — Z23 NEED FOR TDAP VACCINATION: ICD-10-CM

## 2024-05-23 DIAGNOSIS — Z87.39 HISTORY OF GOUT: ICD-10-CM

## 2024-05-23 DIAGNOSIS — E03.9 ACQUIRED HYPOTHYROIDISM: ICD-10-CM

## 2024-05-23 DIAGNOSIS — E78.1 HIGH TRIGLYCERIDES: ICD-10-CM

## 2024-05-23 DIAGNOSIS — F90.9 ATTENTION DEFICIT HYPERACTIVITY DISORDER (ADHD), UNSPECIFIED ADHD TYPE: ICD-10-CM

## 2024-05-23 DIAGNOSIS — Z00.00 PHYSICAL EXAM: Primary | ICD-10-CM

## 2024-05-23 LAB
ALBUMIN SERPL-MCNC: 4.1 G/DL (ref 3.4–5)
ALBUMIN/GLOB SERPL: 1.6 {RATIO} (ref 1.1–2.2)
ALP SERPL-CCNC: 46 U/L (ref 40–129)
ALT SERPL-CCNC: 12 U/L (ref 10–40)
ANION GAP SERPL CALCULATED.3IONS-SCNC: 9 MMOL/L (ref 3–16)
AST SERPL-CCNC: 14 U/L (ref 15–37)
BILIRUB SERPL-MCNC: 0.4 MG/DL (ref 0–1)
BUN SERPL-MCNC: 18 MG/DL (ref 7–20)
CALCIUM SERPL-MCNC: 8.9 MG/DL (ref 8.3–10.6)
CHLORIDE SERPL-SCNC: 108 MMOL/L (ref 99–110)
CHOLEST SERPL-MCNC: 143 MG/DL (ref 0–199)
CO2 SERPL-SCNC: 27 MMOL/L (ref 21–32)
CREAT SERPL-MCNC: 1 MG/DL (ref 0.9–1.3)
GFR SERPLBLD CREATININE-BSD FMLA CKD-EPI: >90 ML/MIN/{1.73_M2}
GLUCOSE SERPL-MCNC: 77 MG/DL (ref 70–99)
HDLC SERPL-MCNC: 44 MG/DL (ref 40–60)
LDLC SERPL CALC-MCNC: 85 MG/DL
POTASSIUM SERPL-SCNC: 4.4 MMOL/L (ref 3.5–5.1)
PROT SERPL-MCNC: 6.6 G/DL (ref 6.4–8.2)
SODIUM SERPL-SCNC: 144 MMOL/L (ref 136–145)
T3FREE SERPL-MCNC: 2.9 PG/ML (ref 2.3–4.2)
T4 FREE SERPL-MCNC: 1.9 NG/DL (ref 0.9–1.8)
TRIGL SERPL-MCNC: 71 MG/DL (ref 0–150)
TSH SERPL DL<=0.005 MIU/L-ACNC: 0.62 UIU/ML (ref 0.27–4.2)
URATE SERPL-MCNC: 6.6 MG/DL (ref 3.5–7.2)
VLDLC SERPL CALC-MCNC: 14 MG/DL

## 2024-05-23 PROCEDURE — 90715 TDAP VACCINE 7 YRS/> IM: CPT | Performed by: NURSE PRACTITIONER

## 2024-05-23 PROCEDURE — 99396 PREV VISIT EST AGE 40-64: CPT | Performed by: NURSE PRACTITIONER

## 2024-05-23 PROCEDURE — 90471 IMMUNIZATION ADMIN: CPT | Performed by: NURSE PRACTITIONER

## 2024-05-23 ASSESSMENT — PATIENT HEALTH QUESTIONNAIRE - PHQ9
SUM OF ALL RESPONSES TO PHQ QUESTIONS 1-9: 0
2. FEELING DOWN, DEPRESSED OR HOPELESS: NOT AT ALL
SUM OF ALL RESPONSES TO PHQ9 QUESTIONS 1 & 2: 0
SUM OF ALL RESPONSES TO PHQ QUESTIONS 1-9: 0
1. LITTLE INTEREST OR PLEASURE IN DOING THINGS: NOT AT ALL

## 2024-05-23 ASSESSMENT — ENCOUNTER SYMPTOMS
GASTROINTESTINAL NEGATIVE: 1
WHEEZING: 0
SHORTNESS OF BREATH: 0

## 2024-05-23 NOTE — PROGRESS NOTES
Patient ID: Juan Antonio Patel Jr. is a 43 y.o. male who presents today for a Physical Exam.      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    Hypothyroidism     Irritable     Nephrolith 05/2010    Schwannoma     Sleep apnea     uses c-pap    Thyroid disease     hypothyroid - chronic lymphocytic thyroiditis       Past Surgical History:   Procedure Laterality Date    HERNIA REPAIR  04/05/2016    ventral    AR LAPS GSTRC RSTRICTIV PX LONGITUDINAL GASTRECTOMY N/A 10/08/2018    LAPAROSCOPIC SLEEVE GASTRECTOMY-ETHICON performed by Atilio Medrano MD at [facility] OR    THYROIDECTOMY, PARTIAL  03/2015    UPPER GASTROINTESTINAL ENDOSCOPY  05/11/2018    VASECTOMY N/A 2012    WISDOM TOOTH EXTRACTION         Family History   Problem Relation Age of Onset    Depression Mother     Psoriasis Mother     Arthritis Father         RA    Heart Disease Father     Rheum Arthritis Father     Cancer Sister         thyroid    Depression Sister     Arthritis Sister     Cancer Paternal Grandfather         lung    Cancer Paternal Aunt         lung    Depression Sister     Mental Illness Sister           Social History     Socioeconomic History    Marital status:      Spouse name: None    Number of children: None    Years of education: None    Highest education level: None   Tobacco Use    Smoking status: Former     Current packs/day: 0.00     Average packs/day: 0.5 packs/day for 17.0 years (8.5 ttl pk-yrs)     Types: Cigarettes     Start date:

## 2024-05-31 DIAGNOSIS — F51.01 PRIMARY INSOMNIA: ICD-10-CM

## 2024-05-31 DIAGNOSIS — F90.9 ATTENTION DEFICIT HYPERACTIVITY DISORDER (ADHD), UNSPECIFIED ADHD TYPE: ICD-10-CM

## 2024-06-03 ENCOUNTER — OFFICE VISIT (OUTPATIENT)
Dept: ENDOCRINOLOGY | Age: 44
End: 2024-06-03
Payer: COMMERCIAL

## 2024-06-03 VITALS
OXYGEN SATURATION: 98 % | BODY MASS INDEX: 26.56 KG/M2 | HEART RATE: 84 BPM | SYSTOLIC BLOOD PRESSURE: 121 MMHG | WEIGHT: 207 LBS | RESPIRATION RATE: 15 BRPM | HEIGHT: 74 IN | DIASTOLIC BLOOD PRESSURE: 81 MMHG

## 2024-06-03 DIAGNOSIS — E03.9 ACQUIRED HYPOTHYROIDISM: ICD-10-CM

## 2024-06-03 PROCEDURE — 99214 OFFICE O/P EST MOD 30 MIN: CPT | Performed by: INTERNAL MEDICINE

## 2024-06-03 PROCEDURE — 1036F TOBACCO NON-USER: CPT | Performed by: INTERNAL MEDICINE

## 2024-06-03 PROCEDURE — G8427 DOCREV CUR MEDS BY ELIG CLIN: HCPCS | Performed by: INTERNAL MEDICINE

## 2024-06-03 PROCEDURE — G8417 CALC BMI ABV UP PARAM F/U: HCPCS | Performed by: INTERNAL MEDICINE

## 2024-06-03 RX ORDER — LEVOTHYROXINE SODIUM 0.2 MG/1
200 TABLET ORAL DAILY
Qty: 30 TABLET | Refills: 5 | Status: SHIPPED | OUTPATIENT
Start: 2024-06-03

## 2024-06-03 RX ORDER — LEVOTHYROXINE SODIUM 0.03 MG/1
TABLET ORAL
Qty: 30 TABLET | Refills: 5 | Status: SHIPPED | OUTPATIENT
Start: 2024-06-03

## 2024-06-03 RX ORDER — DEXTROAMPHETAMINE SACCHARATE, AMPHETAMINE ASPARTATE, DEXTROAMPHETAMINE SULFATE AND AMPHETAMINE SULFATE 7.5; 7.5; 7.5; 7.5 MG/1; MG/1; MG/1; MG/1
30 TABLET ORAL 2 TIMES DAILY
Qty: 60 TABLET | Refills: 0 | Status: SHIPPED | OUTPATIENT
Start: 2024-06-03 | End: 2024-07-03

## 2024-06-03 RX ORDER — ZOLPIDEM TARTRATE 5 MG/1
TABLET ORAL
Qty: 30 TABLET | Refills: 0 | Status: SHIPPED | OUTPATIENT
Start: 2024-06-03 | End: 2024-07-03

## 2024-06-03 NOTE — TELEPHONE ENCOUNTER
Last Office Visit  -  5/23/24  Next Office Visit  -  n/a    Last Filled  -  4/3/24  Last UDS -  10/19/23  Contract -  9/9/21

## 2024-06-03 NOTE — PROGRESS NOTES
Surgical History:   Procedure Laterality Date    HERNIA REPAIR  04/05/2016    ventral    VT LAPS GSTRC RSTRICTIV PX LONGITUDINAL GASTRECTOMY N/A 10/08/2018    LAPAROSCOPIC SLEEVE GASTRECTOMY-ETHICON performed by Atilio Medrano MD at [facility] OR    THYROIDECTOMY, PARTIAL  03/2015    UPPER GASTROINTESTINAL ENDOSCOPY  05/11/2018    VASECTOMY N/A 2012    WISDOM TOOTH EXTRACTION       Current Outpatient Medications   Medication Sig Dispense Refill    zolpidem (AMBIEN) 5 MG tablet TAKE 1 TABLET BY MOUTH AT NIGHT AS NEEDED FOR SLEEP. MAX 1 TABLET IN 24 HOURS 30 tablet 0    amphetamine-dextroamphetamine (ADDERALL, 30MG,) 30 MG tablet Take 1 tablet by mouth 2 times daily for 30 days. 60 tablet 0    levothyroxine (SYNTHROID) 25 MCG tablet TAKE 1 TABLET BY MOUTH EVERY DAY. TAKE IN ADDITION TO 200MCG TABLET 30 tablet 3    levothyroxine (SYNTHROID) 200 MCG tablet TAKE 1 TABLET BY MOUTH EVERY DAY 30 tablet 3    buPROPion (WELLBUTRIN SR) 200 MG extended release tablet Take 1 tablet by mouth twice daily 180 tablet 3    busPIRone (BUSPAR) 15 MG tablet TAKE 1 TABLET BY MOUTH 3 TIMES A DAY AS NEEDED for anxiety 90 tablet 11    nystatin (MYCOSTATIN) 577933 UNIT/GM ointment Apply topically 2 times daily. 30 g 2    MOUNJARO 7.5 MG/0.5ML SOPN SC injection       topiramate (TOPAMAX) 50 MG tablet 1 tablet 2 times daily      UBRELVY 100 MG TABS       Multiple Vitamins-Minerals (THERAPEUTIC MULTIVITAMIN-MINERALS) tablet Take 1 tablet by mouth daily       No current facility-administered medications for this visit.       Review of Systems  Please see scanned     Objective:      Vitals:    06/03/24 1531   Resp: 15     Vitals:    06/03/24 1531   BP: 121/81   Pulse: 84   Resp: 15   SpO2: 98%             Constitutional: Well-developed, appears stated age, cooperative, in no acute distress  H/E/N/M/T:atraumatic, normocephalic, external ears, nose, lips normal without lesions  Eyes: Lids, lashes, conjunctivae and sclerae normal, No proptosis, no

## 2024-07-08 NOTE — TELEPHONE ENCOUNTER
Last Office Visit  -  12/21/23  Next Office Visit  -  n/a    Last Filled  -  1/23/23  Last UDS -    Contract -    
moderate

## 2024-07-20 DIAGNOSIS — F51.01 PRIMARY INSOMNIA: ICD-10-CM

## 2024-07-22 RX ORDER — ZOLPIDEM TARTRATE 5 MG/1
TABLET ORAL
Qty: 30 TABLET | Refills: 0 | Status: SHIPPED | OUTPATIENT
Start: 2024-07-22 | End: 2024-08-21

## 2024-07-22 NOTE — TELEPHONE ENCOUNTER
Last Office Visit  -  5/23/24  Next Office Visit  -  n/a    Last Filled  -  6/3/24  Last UDS -  10/19/23  Contract -  9/9/21

## 2024-07-27 DIAGNOSIS — F90.9 ATTENTION DEFICIT HYPERACTIVITY DISORDER (ADHD), UNSPECIFIED ADHD TYPE: ICD-10-CM

## 2024-07-29 RX ORDER — DEXTROAMPHETAMINE SACCHARATE, AMPHETAMINE ASPARTATE, DEXTROAMPHETAMINE SULFATE AND AMPHETAMINE SULFATE 7.5; 7.5; 7.5; 7.5 MG/1; MG/1; MG/1; MG/1
30 TABLET ORAL 2 TIMES DAILY
Qty: 60 TABLET | Refills: 0 | Status: SHIPPED | OUTPATIENT
Start: 2024-07-29 | End: 2024-08-28

## 2024-09-03 ENCOUNTER — HOSPITAL ENCOUNTER (OUTPATIENT)
Dept: MRI IMAGING | Age: 44
Discharge: HOME | End: 2024-09-03
Attending: NEUROLOGICAL SURGERY
Payer: COMMERCIAL

## 2024-09-03 DIAGNOSIS — D33.3 ACOUSTIC NEUROMA (HCC): ICD-10-CM

## 2024-09-03 PROCEDURE — 6360000004 HC RX CONTRAST MEDICATION: Performed by: NEUROLOGICAL SURGERY

## 2024-09-03 PROCEDURE — 70553 MRI BRAIN STEM W/O & W/DYE: CPT

## 2024-09-03 PROCEDURE — A9579 GAD-BASE MR CONTRAST NOS,1ML: HCPCS | Performed by: NEUROLOGICAL SURGERY

## 2024-09-03 RX ADMIN — GADOTERIDOL 18 ML: 279.3 INJECTION, SOLUTION INTRAVENOUS at 13:21

## 2024-09-06 DIAGNOSIS — F51.01 PRIMARY INSOMNIA: ICD-10-CM

## 2024-09-06 RX ORDER — ZOLPIDEM TARTRATE 5 MG/1
TABLET ORAL
Qty: 30 TABLET | Refills: 0 | Status: SHIPPED | OUTPATIENT
Start: 2024-09-06 | End: 2024-10-06

## 2024-09-06 NOTE — TELEPHONE ENCOUNTER
Last Office Visit  -  5/23/24  Next Office Visit  -  n/a    Last Filled  -  7/22/24  Last UDS -  10/19/23  Contract -  9/9/21

## 2024-09-12 DIAGNOSIS — F90.9 ATTENTION DEFICIT HYPERACTIVITY DISORDER (ADHD), UNSPECIFIED ADHD TYPE: ICD-10-CM

## 2024-09-13 RX ORDER — DEXTROAMPHETAMINE SACCHARATE, AMPHETAMINE ASPARTATE, DEXTROAMPHETAMINE SULFATE AND AMPHETAMINE SULFATE 7.5; 7.5; 7.5; 7.5 MG/1; MG/1; MG/1; MG/1
30 TABLET ORAL 2 TIMES DAILY
Qty: 60 TABLET | Refills: 0 | Status: SHIPPED | OUTPATIENT
Start: 2024-09-13 | End: 2024-10-13

## 2024-10-09 DIAGNOSIS — F51.01 PRIMARY INSOMNIA: ICD-10-CM

## 2024-10-10 RX ORDER — ZOLPIDEM TARTRATE 5 MG/1
TABLET ORAL
Qty: 30 TABLET | Refills: 0 | Status: SHIPPED | OUTPATIENT
Start: 2024-10-10 | End: 2024-11-09

## 2024-10-10 NOTE — TELEPHONE ENCOUNTER
Last Office Visit  -  5/23/24  Next Office Visit  -  n/a    Last Filled  -  9/6/24  Last UDS -  10/19/23  Contract -  9/9/21

## 2024-11-15 DIAGNOSIS — F51.01 PRIMARY INSOMNIA: ICD-10-CM

## 2024-11-15 DIAGNOSIS — F90.9 ATTENTION DEFICIT HYPERACTIVITY DISORDER (ADHD), UNSPECIFIED ADHD TYPE: ICD-10-CM

## 2024-11-15 RX ORDER — ZOLPIDEM TARTRATE 5 MG/1
TABLET ORAL
Qty: 30 TABLET | Refills: 0 | Status: SHIPPED | OUTPATIENT
Start: 2024-11-15 | End: 2024-12-15

## 2024-11-15 RX ORDER — DEXTROAMPHETAMINE SACCHARATE, AMPHETAMINE ASPARTATE, DEXTROAMPHETAMINE SULFATE AND AMPHETAMINE SULFATE 7.5; 7.5; 7.5; 7.5 MG/1; MG/1; MG/1; MG/1
30 TABLET ORAL 2 TIMES DAILY
Qty: 60 TABLET | Refills: 0 | OUTPATIENT
Start: 2024-11-15 | End: 2024-12-15

## 2024-11-15 NOTE — TELEPHONE ENCOUNTER
Last Office Visit  -  05/23/2024  Next Office Visit  -  n/a    Last Filled  -  10/10/2024  Last UDS -    Contract -

## 2024-11-15 NOTE — TELEPHONE ENCOUNTER
Last Office Visit  -  05/23/2024  Next Office Visit  -  n/a    Last Filled  -  09/13/2024  Last UDS -    Contract -

## 2024-11-16 RX ORDER — DEXTROAMPHETAMINE SACCHARATE, AMPHETAMINE ASPARTATE, DEXTROAMPHETAMINE SULFATE AND AMPHETAMINE SULFATE 7.5; 7.5; 7.5; 7.5 MG/1; MG/1; MG/1; MG/1
30 TABLET ORAL 2 TIMES DAILY
Qty: 60 TABLET | Refills: 0 | Status: SHIPPED | OUTPATIENT
Start: 2024-11-16 | End: 2024-12-16

## 2024-11-18 ENCOUNTER — TELEMEDICINE (OUTPATIENT)
Dept: FAMILY MEDICINE CLINIC | Age: 44
End: 2024-11-18
Payer: COMMERCIAL

## 2024-11-18 DIAGNOSIS — F90.9 ATTENTION DEFICIT HYPERACTIVITY DISORDER (ADHD), UNSPECIFIED ADHD TYPE: ICD-10-CM

## 2024-11-18 DIAGNOSIS — F51.01 PRIMARY INSOMNIA: Primary | ICD-10-CM

## 2024-11-18 PROCEDURE — G8427 DOCREV CUR MEDS BY ELIG CLIN: HCPCS | Performed by: NURSE PRACTITIONER

## 2024-11-18 PROCEDURE — 99213 OFFICE O/P EST LOW 20 MIN: CPT | Performed by: NURSE PRACTITIONER

## 2024-11-18 RX ORDER — NALTREXONE HYDROCHLORIDE 50 MG/1
25 TABLET, FILM COATED ORAL 2 TIMES DAILY
COMMUNITY

## 2024-11-18 SDOH — ECONOMIC STABILITY: FOOD INSECURITY: WITHIN THE PAST 12 MONTHS, YOU WORRIED THAT YOUR FOOD WOULD RUN OUT BEFORE YOU GOT MONEY TO BUY MORE.: NEVER TRUE

## 2024-11-18 SDOH — ECONOMIC STABILITY: INCOME INSECURITY: HOW HARD IS IT FOR YOU TO PAY FOR THE VERY BASICS LIKE FOOD, HOUSING, MEDICAL CARE, AND HEATING?: NOT HARD AT ALL

## 2024-11-18 SDOH — ECONOMIC STABILITY: FOOD INSECURITY: WITHIN THE PAST 12 MONTHS, THE FOOD YOU BOUGHT JUST DIDN'T LAST AND YOU DIDN'T HAVE MONEY TO GET MORE.: NEVER TRUE

## 2024-11-18 NOTE — PROGRESS NOTES
Juan Antonio Patel Jr. (:  1980) is a Established patient, presenting virtually for evaluation of the following:    Assessment & Plan   Below is the assessment and plan developed based on review of pertinent history, physical exam, labs, studies, and medications.  1. Primary insomnia  Continue ambien 5mg PRN  2. Attention deficit hyperactivity disorder (ADHD), unspecified ADHD type  Adderall 30mg BID- stable-continue  No follow-ups on file.       Subjective   HPI  Insomnia: ambien 5 mg as needed- takes 2-3 nights a week   ADHD: adderall 30mg BID    Review of Systems   Psychiatric/Behavioral:  Negative for decreased concentration (stable on adderall) and sleep disturbance (ambien 2-3 times a week).           Objective   Patient-Reported Vitals  No data recorded     Physical Exam  Constitutional:       Appearance: Normal appearance.   HENT:      Head: Normocephalic.   Eyes:      Conjunctiva/sclera: Conjunctivae normal.   Pulmonary:      Effort: Pulmonary effort is normal.   Neurological:      General: No focal deficit present.      Mental Status: He is alert and oriented to person, place, and time.   Psychiatric:         Mood and Affect: Mood normal.         Behavior: Behavior normal.         Thought Content: Thought content normal.         Judgment: Judgment normal.                  Juan Antonio Patel Jr., was evaluated through a synchronous (real-time) audio-video encounter. The patient (or guardian if applicable) is aware that this is a billable service, which includes applicable co-pays. This Virtual Visit was conducted with patient's (and/or legal guardian's) consent. Patient identification was verified, and a caregiver was present when appropriate.   The patient was located at Home: [address] [place] [address]  Provider was located at Facility (Appt Dept): [address] [place] [address]  Confirm you are appropriately licensed, registered, or certified to deliver care in the [facility]

## 2024-12-02 DIAGNOSIS — E03.9 ACQUIRED HYPOTHYROIDISM: ICD-10-CM

## 2024-12-02 LAB
T3FREE SERPL-MCNC: 2.9 PG/ML (ref 2.3–4.2)
T4 FREE SERPL-MCNC: 1.3 NG/DL (ref 0.9–1.8)
TSH SERPL DL<=0.005 MIU/L-ACNC: 1.18 UIU/ML (ref 0.27–4.2)

## 2024-12-03 ENCOUNTER — OFFICE VISIT (OUTPATIENT)
Dept: ENDOCRINOLOGY | Age: 44
End: 2024-12-03
Payer: COMMERCIAL

## 2024-12-03 VITALS
DIASTOLIC BLOOD PRESSURE: 84 MMHG | OXYGEN SATURATION: 99 % | RESPIRATION RATE: 15 BRPM | BODY MASS INDEX: 30.04 KG/M2 | WEIGHT: 234 LBS | HEART RATE: 90 BPM | SYSTOLIC BLOOD PRESSURE: 136 MMHG

## 2024-12-03 DIAGNOSIS — E03.9 ACQUIRED HYPOTHYROIDISM: ICD-10-CM

## 2024-12-03 PROCEDURE — 99213 OFFICE O/P EST LOW 20 MIN: CPT | Performed by: INTERNAL MEDICINE

## 2024-12-03 PROCEDURE — G8427 DOCREV CUR MEDS BY ELIG CLIN: HCPCS | Performed by: INTERNAL MEDICINE

## 2024-12-03 PROCEDURE — G8417 CALC BMI ABV UP PARAM F/U: HCPCS | Performed by: INTERNAL MEDICINE

## 2024-12-03 PROCEDURE — 1036F TOBACCO NON-USER: CPT | Performed by: INTERNAL MEDICINE

## 2024-12-03 PROCEDURE — G8484 FLU IMMUNIZE NO ADMIN: HCPCS | Performed by: INTERNAL MEDICINE

## 2024-12-03 RX ORDER — LEVOTHYROXINE SODIUM 200 UG/1
200 TABLET ORAL DAILY
Qty: 30 TABLET | Refills: 5 | Status: SHIPPED | OUTPATIENT
Start: 2024-12-03

## 2024-12-03 RX ORDER — LEVOTHYROXINE SODIUM 25 UG/1
TABLET ORAL
Qty: 30 TABLET | Refills: 5 | Status: SHIPPED | OUTPATIENT
Start: 2024-12-03

## 2024-12-03 NOTE — PROGRESS NOTES
Subjective:      41 y/o WM who is here for thyroid evaluation.     Referred by Bernie Mello    Interim:    Gained weight  Insurance did not cover mounjaro now  He has left neuroma, will have surgery    Generic levothyroxine  due to cost      Seeing weight management at [facility]  Lost 42 +36 lbs on mounjaro    He was diagnosed with hypothyroidism years ago    Dx with Hashimoto's    H/o left lobectomy    Has been on different dosages    Currently taking levothyroxine 200+75  Dose changed on 9/21    Takes early in the morning, on empty stomach    9/21 TSH 6.76  FT4 1.5  6/21 TSH 10.34 FT4 1.4  2/21 TSH 6.11  8/20 TSH 28.06  4/19   Off medication  4/18 TSH 12.42  10/17 TSH 0.42    Initial complaints:see HPI  fatigue    History of radiation to patient's neck:  no  Family history includes thyroid issue  Family history of thyroid cancer: older sister thyroid cancer    12/21  TSH 5.94 FT4 1.4 FT3 3.3    Switched to Synthroid 300mcg daily    6/22 TSH 0.27  FT4 2.0 FT3 3.7  12/22 TSH 0.01 FT4 2.1    4/23 TSH <0.01 FT4 2.3  on 300mcg daily and 150mcg on Sunday but mostly still does fill    8/23 TSH 0.01  dose changed to 225mcg  1/24 TSH 0.18  5/24 TSH 0.62 FT4 1.9  12/24 TSH 1.18    7/21 USG    FINDINGS:   Right thyroid lobe:  6.7 x 3.3 x 2.7 cm       Left thyroid lobe:  Not measured       Isthmus:  9 mm       Thyroid Gland:  Status post left thyroidectomy.  The right thyroid gland is   heterogeneously enlarged       Nodules: No thyroid nodules are present.       Cervical lymphadenopathy: No abnormal lymph nodes in the imaged portions of   the neck.     He has a h/o gastric sleeve    Mild. 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

## 2024-12-16 ENCOUNTER — OFFICE VISIT (OUTPATIENT)
Dept: FAMILY MEDICINE CLINIC | Age: 44
End: 2024-12-16
Payer: COMMERCIAL

## 2024-12-16 VITALS
SYSTOLIC BLOOD PRESSURE: 124 MMHG | HEART RATE: 91 BPM | WEIGHT: 235 LBS | BODY MASS INDEX: 30.17 KG/M2 | OXYGEN SATURATION: 98 % | DIASTOLIC BLOOD PRESSURE: 70 MMHG

## 2024-12-16 DIAGNOSIS — Z01.818 PRE-OP EXAM: ICD-10-CM

## 2024-12-16 DIAGNOSIS — F90.9 ATTENTION DEFICIT HYPERACTIVITY DISORDER (ADHD), UNSPECIFIED ADHD TYPE: ICD-10-CM

## 2024-12-16 DIAGNOSIS — D33.3 ACOUSTIC NEUROMA (HCC): Primary | ICD-10-CM

## 2024-12-16 PROCEDURE — G8484 FLU IMMUNIZE NO ADMIN: HCPCS | Performed by: NURSE PRACTITIONER

## 2024-12-16 PROCEDURE — 99213 OFFICE O/P EST LOW 20 MIN: CPT | Performed by: NURSE PRACTITIONER

## 2024-12-16 PROCEDURE — 93000 ELECTROCARDIOGRAM COMPLETE: CPT | Performed by: NURSE PRACTITIONER

## 2024-12-16 PROCEDURE — G8427 DOCREV CUR MEDS BY ELIG CLIN: HCPCS | Performed by: NURSE PRACTITIONER

## 2024-12-16 PROCEDURE — 1036F TOBACCO NON-USER: CPT | Performed by: NURSE PRACTITIONER

## 2024-12-16 PROCEDURE — G8417 CALC BMI ABV UP PARAM F/U: HCPCS | Performed by: NURSE PRACTITIONER

## 2024-12-16 RX ORDER — DEXTROAMPHETAMINE SACCHARATE, AMPHETAMINE ASPARTATE, DEXTROAMPHETAMINE SULFATE AND AMPHETAMINE SULFATE 7.5; 7.5; 7.5; 7.5 MG/1; MG/1; MG/1; MG/1
30 TABLET ORAL 2 TIMES DAILY
Qty: 60 TABLET | Refills: 0 | Status: SHIPPED | OUTPATIENT
Start: 2024-12-16 | End: 2025-01-15

## 2024-12-16 NOTE — PROGRESS NOTES
[facility]  [phone]  Fax: [phone]   Pre-operative History and Physical      DIAGNOSIS:  left acoustic neuroma    PROCEDURE:  LEFT TRANSLABYRINTHINE APPROACH FOR RESECTION OF VESTIBULAR SCHWANNOMA/ ABDOMINAL FAT GRAFT       History Obtained From:  patient, electronic medical record    HISTORY OF PRESENT ILLNESS:    The patient is a 44 y.o. male with significant past medical history of left acoustic neuroma. I am seeing this patient for preop consultation for Dr. Rodolfo Wells, , Sadiq Betancur, Dr. Rahul Alexander.       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    Hypothyroidism     Irritable     Nephrolith 05/2010    Schwannoma     Sleep apnea     uses c-pap    Thyroid disease     hypothyroid - chronic lymphocytic thyroiditis     Past Surgical History:   Procedure Laterality Date    HERNIA REPAIR  04/05/2016    ventral    MA LAPS GSTRC RSTRICTIV PX LONGITUDINAL GASTRECTOMY N/A 10/08/2018    LAPAROSCOPIC SLEEVE GASTRECTOMY-ETHICON performed by Atilio Medrano MD at [facility] OR    THYROIDECTOMY, PARTIAL  03/2015    UPPER GASTROINTESTINAL ENDOSCOPY  05/11/2018    VASECTOMY N/A 2012    WISDOM TOOTH EXTRACTION       Current Outpatient Medications   Medication Sig Dispense Refill    levothyroxine (SYNTHROID) 200 MCG tablet Take 1 tablet by mouth daily 30 tablet 5    levothyroxine (SYNTHROID) 25 MCG tablet One tab PO daily 30 tablet 5    naltrexone (DEPADE) 50 MG tablet Take 0.5 tablets by mouth in the morning and at bedtime      amphetamine-dextroamphetamine (ADDERALL, 30MG,) 30 MG tablet Take 1 tablet by mouth 2 times daily for 30 days. 60 tablet 0    buPROPion (WELLBUTRIN SR) 200 MG extended release tablet Take 1 tablet by mouth twice daily 180 tablet 3    busPIRone (BUSPAR) 15 MG tablet TAKE 1 TABLET BY MOUTH 3 TIMES A DAY AS NEEDED for anxiety 90 tablet 11

## 2024-12-17 ENCOUNTER — HOSPITAL ENCOUNTER (OUTPATIENT)
Dept: CT IMAGING | Age: 44
Discharge: HOME | End: 2024-12-17
Attending: NEUROLOGICAL SURGERY
Payer: COMMERCIAL

## 2024-12-17 DIAGNOSIS — D33.3 ACOUSTIC NEUROMA (HCC): ICD-10-CM

## 2024-12-17 PROCEDURE — 70480 CT ORBIT/EAR/FOSSA W/O DYE: CPT

## 2024-12-21 DIAGNOSIS — F51.01 PRIMARY INSOMNIA: ICD-10-CM

## 2024-12-23 RX ORDER — ZOLPIDEM TARTRATE 5 MG/1
TABLET ORAL
Qty: 30 TABLET | Refills: 0 | Status: SHIPPED | OUTPATIENT
Start: 2024-12-23 | End: 2025-01-22

## 2024-12-24 DIAGNOSIS — F41.9 ANXIETY: ICD-10-CM

## 2024-12-24 RX ORDER — BUSPIRONE HYDROCHLORIDE 10 MG/1
TABLET ORAL
Qty: 135 TABLET | Refills: 5 | Status: SHIPPED | OUTPATIENT
Start: 2024-12-24

## 2024-12-30 NOTE — PROGRESS NOTES
[facility] PRE-SURGICAL TESTING INSTRUCTIONS                      PRIOR TO PROCEDURE DATE:    1. PLEASE FOLLOW ANY INSTRUCTIONS GIVEN TO YOU PER YOUR SURGEON.      2. Arrange for someone to drive you home and be with you for the first 24 hours after discharge for your safety after your procedure for which you received sedation. Ensure it is someone we can share information with regarding your discharge.     NOTE: At this time ONLY 2 ADULTS may accompany you   One person ENCOURAGED to stay at hospital entire time if outpatient surgery      3. You must contact your surgeon for instructions IF:  You are taking any blood thinners, aspirin, anti-inflammatory or vitamins.  There is a change in your physical condition such as a cold, fever, rash, cuts, sores, or any other infection, especially near your surgical site.    4. Do not drink alcohol the day before or day of your procedure.  Do not use any recreational marijuana at least 24 hours or street drugs (heroin, cocaine) at minimum 5 days prior to your procedure.     5. A Pre-Surgical History and Physical MUST be completed WITHIN 30 DAYS OR LESS prior to your procedure.by your Physician or an Urgent Care        THE DAY OF YOUR PROCEDURE:  1.  Follow instructions for ARRIVAL TIME as DIRECTED BY YOUR SURGEON.     2. Enter the MAIN entrance from [facility] and follow the signs to the free Parking Garage or  Parking (offered free of charge 7 am-5pm).      3. Enter the Main Entrance of the hospital (do not enter from the lower level of the parking garage). Upon entrance, check in with the  at the surgical information desk on your LEFT.   Bring your insurance card and photo ID to register      4. DO NOT EAT ANYTHING 8 hours prior to arrival for surgery.  You may have up to 8 ounces of water 4 hours prior to your arrival for surgery.   NOTE: ALL Gastric, Bariatric & Bowel surgery patients - you MUST follow your surgeon's instructions regarding  it with you on the day of your procedure.    10. If you use oxygen at home, please bring your oxygen tank with you to hospital..     11. We recommend that valuable personal belongings such as cash, cell phones, e-tablets, or jewelry, be left at home during your stay. The hospital will not be responsible for valuables that are not secured in the hospital safe. However, if your insurance requires a co-pay, you may want to bring a method of payment, i.e., Check or credit card, if you wish to pay your co-pay the day of surgery.      12. If you are to stay overnight, you may bring a bag with personal items. Please have any large items you may need brought in by your family after your arrival to your hospital room.    13. If you have a Living Will or Durable Power of , please bring a copy on the day of your procedure.     How we keep you safe and work to prevent surgical site infections:   1. Health care workers should always check your ID bracelet to verify your name and birth date. You will be asked many times to state your name, date of birth, and allergies.    2. Health care workers should always clean their hands with soap or alcohol gel before providing care to you. It is okay to ask anyone if they cleaned their hands before they touch you.    3. You will be actively involved in verifying the type of procedure you are having and ensuring the correct surgical site. This will be confirmed multiple times prior to your procedure. Do NOT xuan your surgery site UNLESS instructed to by your surgeon.     4. When you are in the operating room, your surgical site will be cleansed with a special soap, and in most cases, you will be given an antibiotic before the surgery begins.      What to expect AFTER your procedure?  1. Immediately following your procedure, your will be taken to the PACU for the first phase of your recovery.  Your nurse will help you recover from any potential side effects of anesthesia, such as

## 2024-12-30 NOTE — PROGRESS NOTES
12/30/24 @ 7091 Preoperative bathing instructions reviewed with patient: Hibiclens from neck down, Dial antibacterial soap for face and head. Reminded patient that hair may be cut or shaved to allow access to area of scalp. For any questions prior to day of surgery, please contact [facility], Cranial Care Navigator RN at [phone].  Informed patient that on day of surgery, Your Guide To Neurosurgery booklet may be provided for additional resources related to recovery for patient and family if not provided at [facility] office appointment before surgery. MD

## 2025-01-08 ENCOUNTER — ANESTHESIA EVENT (OUTPATIENT)
Dept: OPERATING ROOM | Age: 45
End: 2025-01-08
Payer: COMMERCIAL

## 2025-01-09 ENCOUNTER — APPOINTMENT (OUTPATIENT)
Dept: CT IMAGING | Age: 45
DRG: 026 | End: 2025-01-09
Attending: NEUROLOGICAL SURGERY
Payer: COMMERCIAL

## 2025-01-09 ENCOUNTER — HOSPITAL ENCOUNTER (INPATIENT)
Age: 45
LOS: 3 days | Discharge: HOME | DRG: 026 | End: 2025-01-12
Attending: NEUROLOGICAL SURGERY | Admitting: NEUROLOGICAL SURGERY
Payer: COMMERCIAL

## 2025-01-09 ENCOUNTER — ANESTHESIA (OUTPATIENT)
Dept: OPERATING ROOM | Age: 45
End: 2025-01-09
Payer: COMMERCIAL

## 2025-01-09 DIAGNOSIS — D33.3 UNILATERAL VESTIBULAR SCHWANNOMA (HCC): ICD-10-CM

## 2025-01-09 DIAGNOSIS — Z98.890 S/P CRANIOTOMY: Primary | ICD-10-CM

## 2025-01-09 DIAGNOSIS — D33.3 VESTIBULAR SCHWANNOMA (HCC): ICD-10-CM

## 2025-01-09 LAB
ABO + RH BLD: NORMAL
BLD GP AB SCN SERPL QL: NORMAL
GLUCOSE BLD-MCNC: 134 MG/DL (ref 70–99)
GLUCOSE BLD-MCNC: 163 MG/DL (ref 70–99)
PERFORMED ON: ABNORMAL
PERFORMED ON: ABNORMAL

## 2025-01-09 PROCEDURE — 00BM0ZZ EXCISION OF FACIAL NERVE, OPEN APPROACH: ICD-10-PCS | Performed by: NEUROLOGICAL SURGERY

## 2025-01-09 PROCEDURE — 0JU037Z SUPPLEMENT OF SCALP SUBCUTANEOUS TISSUE AND FASCIA WITH AUTOLOGOUS TISSUE SUBSTITUTE, PERCUTANEOUS APPROACH: ICD-10-PCS | Performed by: NEUROLOGICAL SURGERY

## 2025-01-09 PROCEDURE — 3600000014 HC SURGERY LEVEL 4 ADDTL 15MIN: Performed by: NEUROLOGICAL SURGERY

## 2025-01-09 PROCEDURE — 6360000002 HC RX W HCPCS: Performed by: STUDENT IN AN ORGANIZED HEALTH CARE EDUCATION/TRAINING PROGRAM

## 2025-01-09 PROCEDURE — 6360000002 HC RX W HCPCS

## 2025-01-09 PROCEDURE — 2720000010 HC SURG SUPPLY STERILE: Performed by: NEUROLOGICAL SURGERY

## 2025-01-09 PROCEDURE — 70450 CT HEAD/BRAIN W/O DYE: CPT

## 2025-01-09 PROCEDURE — 7100000000 HC PACU RECOVERY - FIRST 15 MIN: Performed by: NEUROLOGICAL SURGERY

## 2025-01-09 PROCEDURE — 94761 N-INVAS EAR/PLS OXIMETRY MLT: CPT

## 2025-01-09 PROCEDURE — 0HB1XZZ EXCISION OF FACE SKIN, EXTERNAL APPROACH: ICD-10-PCS | Performed by: NEUROLOGICAL SURGERY

## 2025-01-09 PROCEDURE — C1713 ANCHOR/SCREW BN/BN,TIS/BN: HCPCS | Performed by: NEUROLOGICAL SURGERY

## 2025-01-09 PROCEDURE — 2500000003 HC RX 250 WO HCPCS: Performed by: STUDENT IN AN ORGANIZED HEALTH CARE EDUCATION/TRAINING PROGRAM

## 2025-01-09 PROCEDURE — 2500000003 HC RX 250 WO HCPCS: Performed by: NEUROLOGICAL SURGERY

## 2025-01-09 PROCEDURE — 2709999900 HC NON-CHARGEABLE SUPPLY: Performed by: NEUROLOGICAL SURGERY

## 2025-01-09 PROCEDURE — 86850 RBC ANTIBODY SCREEN: CPT

## 2025-01-09 PROCEDURE — 99291 CRITICAL CARE FIRST HOUR: CPT

## 2025-01-09 PROCEDURE — 2580000003 HC RX 258

## 2025-01-09 PROCEDURE — 6370000000 HC RX 637 (ALT 250 FOR IP): Performed by: STUDENT IN AN ORGANIZED HEALTH CARE EDUCATION/TRAINING PROGRAM

## 2025-01-09 PROCEDURE — 3700000000 HC ANESTHESIA ATTENDED CARE: Performed by: NEUROLOGICAL SURGERY

## 2025-01-09 PROCEDURE — 03BG0ZZ EXCISION OF INTRACRANIAL ARTERY, OPEN APPROACH: ICD-10-PCS | Performed by: NEUROLOGICAL SURGERY

## 2025-01-09 PROCEDURE — 7100000001 HC PACU RECOVERY - ADDTL 15 MIN: Performed by: NEUROLOGICAL SURGERY

## 2025-01-09 PROCEDURE — 6360000002 HC RX W HCPCS: Performed by: NEUROLOGICAL SURGERY

## 2025-01-09 PROCEDURE — 6370000000 HC RX 637 (ALT 250 FOR IP): Performed by: NEUROLOGICAL SURGERY

## 2025-01-09 PROCEDURE — 2580000003 HC RX 258: Performed by: ANESTHESIOLOGY

## 2025-01-09 PROCEDURE — 86901 BLOOD TYPING SEROLOGIC RH(D): CPT

## 2025-01-09 PROCEDURE — 0JB80ZZ EXCISION OF ABDOMEN SUBCUTANEOUS TISSUE AND FASCIA, OPEN APPROACH: ICD-10-PCS | Performed by: NEUROLOGICAL SURGERY

## 2025-01-09 PROCEDURE — 2700000000 HC OXYGEN THERAPY PER DAY

## 2025-01-09 PROCEDURE — 6360000002 HC RX W HCPCS: Performed by: ANESTHESIOLOGY

## 2025-01-09 PROCEDURE — 3700000001 HC ADD 15 MINUTES (ANESTHESIA): Performed by: NEUROLOGICAL SURGERY

## 2025-01-09 PROCEDURE — 3600000004 HC SURGERY LEVEL 4 BASE: Performed by: NEUROLOGICAL SURGERY

## 2025-01-09 PROCEDURE — 2000000000 HC ICU R&B

## 2025-01-09 PROCEDURE — C1763 CONN TISS, NON-HUMAN: HCPCS | Performed by: NEUROLOGICAL SURGERY

## 2025-01-09 PROCEDURE — 86900 BLOOD TYPING SEROLOGIC ABO: CPT

## 2025-01-09 DEVICE — DURAGEN® PLUS DURAL REGENERATION MATRIX, 3 IN X 3 IN (7.5 CM X 7.5 CM)
Type: IMPLANTABLE DEVICE | Site: BRAIN | Status: FUNCTIONAL
Brand: DURAGEN® PLUS

## 2025-01-09 RX ORDER — SUCCINYLCHOLINE CHLORIDE 20 MG/ML
INJECTION INTRAMUSCULAR; INTRAVENOUS
Status: DISCONTINUED | OUTPATIENT
Start: 2025-01-09 | End: 2025-01-09 | Stop reason: SDUPTHER

## 2025-01-09 RX ORDER — LIDOCAINE HYDROCHLORIDE AND EPINEPHRINE 10; 10 MG/ML; UG/ML
INJECTION, SOLUTION INFILTRATION; PERINEURAL PRN
Status: DISCONTINUED | OUTPATIENT
Start: 2025-01-09 | End: 2025-01-09 | Stop reason: HOSPADM

## 2025-01-09 RX ORDER — PANTOPRAZOLE SODIUM 40 MG/1
40 TABLET, DELAYED RELEASE ORAL
Status: DISCONTINUED | OUTPATIENT
Start: 2025-01-10 | End: 2025-01-12 | Stop reason: HOSPADM

## 2025-01-09 RX ORDER — HEPARIN SODIUM 5000 [USP'U]/ML
5000 INJECTION, SOLUTION INTRAVENOUS; SUBCUTANEOUS EVERY 8 HOURS SCHEDULED
Status: DISCONTINUED | OUTPATIENT
Start: 2025-01-10 | End: 2025-01-12 | Stop reason: HOSPADM

## 2025-01-09 RX ORDER — NALOXONE HYDROCHLORIDE 0.4 MG/ML
0.2 INJECTION, SOLUTION INTRAMUSCULAR; INTRAVENOUS; SUBCUTANEOUS PRN
Status: DISCONTINUED | OUTPATIENT
Start: 2025-01-09 | End: 2025-01-12 | Stop reason: HOSPADM

## 2025-01-09 RX ORDER — LEVOTHYROXINE SODIUM 200 UG/1
200 TABLET ORAL DAILY
Status: DISCONTINUED | OUTPATIENT
Start: 2025-01-10 | End: 2025-01-12 | Stop reason: HOSPADM

## 2025-01-09 RX ORDER — LABETALOL HYDROCHLORIDE 5 MG/ML
20 INJECTION, SOLUTION INTRAVENOUS EVERY 10 MIN PRN
Status: DISCONTINUED | OUTPATIENT
Start: 2025-01-09 | End: 2025-01-12 | Stop reason: HOSPADM

## 2025-01-09 RX ORDER — PROCHLORPERAZINE EDISYLATE 5 MG/ML
10 INJECTION INTRAMUSCULAR; INTRAVENOUS EVERY 6 HOURS PRN
Status: DISCONTINUED | OUTPATIENT
Start: 2025-01-09 | End: 2025-01-12 | Stop reason: HOSPADM

## 2025-01-09 RX ORDER — SODIUM CHLORIDE 9 MG/ML
INJECTION, SOLUTION INTRAVENOUS CONTINUOUS
Status: DISCONTINUED | OUTPATIENT
Start: 2025-01-09 | End: 2025-01-12 | Stop reason: HOSPADM

## 2025-01-09 RX ORDER — DEXTROAMPHETAMINE SACCHARATE, AMPHETAMINE ASPARTATE, DEXTROAMPHETAMINE SULFATE AND AMPHETAMINE SULFATE 7.5; 7.5; 7.5; 7.5 MG/1; MG/1; MG/1; MG/1
30 TABLET ORAL 2 TIMES DAILY
Status: DISCONTINUED | OUTPATIENT
Start: 2025-01-09 | End: 2025-01-09 | Stop reason: SDUPTHER

## 2025-01-09 RX ORDER — LABETALOL HYDROCHLORIDE 5 MG/ML
10 INJECTION, SOLUTION INTRAVENOUS
Status: DISCONTINUED | OUTPATIENT
Start: 2025-01-09 | End: 2025-01-09 | Stop reason: HOSPADM

## 2025-01-09 RX ORDER — SODIUM CHLORIDE, SODIUM LACTATE, POTASSIUM CHLORIDE, AND CALCIUM CHLORIDE .6; .31; .03; .02 G/100ML; G/100ML; G/100ML; G/100ML
IRRIGANT IRRIGATION PRN
Status: DISCONTINUED | OUTPATIENT
Start: 2025-01-09 | End: 2025-01-09 | Stop reason: HOSPADM

## 2025-01-09 RX ORDER — ONDANSETRON 2 MG/ML
INJECTION INTRAMUSCULAR; INTRAVENOUS
Status: DISCONTINUED | OUTPATIENT
Start: 2025-01-09 | End: 2025-01-09 | Stop reason: SDUPTHER

## 2025-01-09 RX ORDER — DEXTROSE MONOHYDRATE 100 MG/ML
INJECTION, SOLUTION INTRAVENOUS CONTINUOUS PRN
Status: DISCONTINUED | OUTPATIENT
Start: 2025-01-09 | End: 2025-01-12 | Stop reason: HOSPADM

## 2025-01-09 RX ORDER — SODIUM CHLORIDE 0.9 % (FLUSH) 0.9 %
5-40 SYRINGE (ML) INJECTION EVERY 12 HOURS SCHEDULED
Status: DISCONTINUED | OUTPATIENT
Start: 2025-01-09 | End: 2025-01-09 | Stop reason: HOSPADM

## 2025-01-09 RX ORDER — HYDROMORPHONE HYDROCHLORIDE 2 MG/ML
INJECTION, SOLUTION INTRAMUSCULAR; INTRAVENOUS; SUBCUTANEOUS
Status: DISCONTINUED | OUTPATIENT
Start: 2025-01-09 | End: 2025-01-09 | Stop reason: SDUPTHER

## 2025-01-09 RX ORDER — DEXAMETHASONE 4 MG/1
4 TABLET ORAL EVERY 8 HOURS SCHEDULED
Status: DISCONTINUED | OUTPATIENT
Start: 2025-01-12 | End: 2025-01-12 | Stop reason: HOSPADM

## 2025-01-09 RX ORDER — PROCHLORPERAZINE EDISYLATE 5 MG/ML
5 INJECTION INTRAMUSCULAR; INTRAVENOUS
Status: DISCONTINUED | OUTPATIENT
Start: 2025-01-09 | End: 2025-01-09 | Stop reason: HOSPADM

## 2025-01-09 RX ORDER — SODIUM CHLORIDE 0.9 % (FLUSH) 0.9 %
5-40 SYRINGE (ML) INJECTION PRN
Status: DISCONTINUED | OUTPATIENT
Start: 2025-01-09 | End: 2025-01-12 | Stop reason: HOSPADM

## 2025-01-09 RX ORDER — TOPIRAMATE 25 MG/1
50 TABLET, FILM COATED ORAL 2 TIMES DAILY
Status: DISCONTINUED | OUTPATIENT
Start: 2025-01-09 | End: 2025-01-12 | Stop reason: HOSPADM

## 2025-01-09 RX ORDER — BISACODYL 10 MG
10 SUPPOSITORY, RECTAL RECTAL DAILY PRN
Status: DISCONTINUED | OUTPATIENT
Start: 2025-01-09 | End: 2025-01-12 | Stop reason: HOSPADM

## 2025-01-09 RX ORDER — SENNA AND DOCUSATE SODIUM 50; 8.6 MG/1; MG/1
1 TABLET, FILM COATED ORAL 2 TIMES DAILY
Status: DISCONTINUED | OUTPATIENT
Start: 2025-01-09 | End: 2025-01-12 | Stop reason: HOSPADM

## 2025-01-09 RX ORDER — DEXAMETHASONE 4 MG/1
4 TABLET ORAL EVERY 6 HOURS SCHEDULED
Status: COMPLETED | OUTPATIENT
Start: 2025-01-10 | End: 2025-01-11

## 2025-01-09 RX ORDER — OXYCODONE HYDROCHLORIDE 5 MG/1
5 TABLET ORAL EVERY 4 HOURS PRN
Status: DISCONTINUED | OUTPATIENT
Start: 2025-01-09 | End: 2025-01-12 | Stop reason: HOSPADM

## 2025-01-09 RX ORDER — GINSENG 100 MG
CAPSULE ORAL PRN
Status: DISCONTINUED | OUTPATIENT
Start: 2025-01-09 | End: 2025-01-09 | Stop reason: ALTCHOICE

## 2025-01-09 RX ORDER — SODIUM CHLORIDE, SODIUM LACTATE, POTASSIUM CHLORIDE, CALCIUM CHLORIDE 600; 310; 30; 20 MG/100ML; MG/100ML; MG/100ML; MG/100ML
INJECTION, SOLUTION INTRAVENOUS
Status: DISCONTINUED | OUTPATIENT
Start: 2025-01-09 | End: 2025-01-09 | Stop reason: SDUPTHER

## 2025-01-09 RX ORDER — HYDROMORPHONE HYDROCHLORIDE 1 MG/ML
1 INJECTION, SOLUTION INTRAMUSCULAR; INTRAVENOUS; SUBCUTANEOUS
Status: ACTIVE | OUTPATIENT
Start: 2025-01-09 | End: 2025-01-11

## 2025-01-09 RX ORDER — DEXAMETHASONE SODIUM PHOSPHATE 4 MG/ML
INJECTION, SOLUTION INTRA-ARTICULAR; INTRALESIONAL; INTRAMUSCULAR; INTRAVENOUS; SOFT TISSUE
Status: DISCONTINUED | OUTPATIENT
Start: 2025-01-09 | End: 2025-01-09 | Stop reason: SDUPTHER

## 2025-01-09 RX ORDER — SODIUM CHLORIDE 9 MG/ML
INJECTION, SOLUTION INTRAVENOUS PRN
Status: DISCONTINUED | OUTPATIENT
Start: 2025-01-09 | End: 2025-01-12 | Stop reason: HOSPADM

## 2025-01-09 RX ORDER — BUPROPION HYDROCHLORIDE 100 MG/1
200 TABLET, EXTENDED RELEASE ORAL 2 TIMES DAILY
Status: DISCONTINUED | OUTPATIENT
Start: 2025-01-09 | End: 2025-01-12 | Stop reason: HOSPADM

## 2025-01-09 RX ORDER — PROMETHAZINE HYDROCHLORIDE 12.5 MG/1
12.5 TABLET ORAL EVERY 6 HOURS PRN
Status: DISCONTINUED | OUTPATIENT
Start: 2025-01-09 | End: 2025-01-12 | Stop reason: HOSPADM

## 2025-01-09 RX ORDER — FENTANYL CITRATE 50 UG/ML
25 INJECTION, SOLUTION INTRAMUSCULAR; INTRAVENOUS EVERY 5 MIN PRN
Status: DISCONTINUED | OUTPATIENT
Start: 2025-01-09 | End: 2025-01-09 | Stop reason: HOSPADM

## 2025-01-09 RX ORDER — SODIUM CHLORIDE 9 MG/ML
INJECTION, SOLUTION INTRAVENOUS
Status: DISCONTINUED | OUTPATIENT
Start: 2025-01-09 | End: 2025-01-09 | Stop reason: SDUPTHER

## 2025-01-09 RX ORDER — OXYCODONE HYDROCHLORIDE 5 MG/1
10 TABLET ORAL EVERY 4 HOURS PRN
Status: DISCONTINUED | OUTPATIENT
Start: 2025-01-09 | End: 2025-01-12 | Stop reason: HOSPADM

## 2025-01-09 RX ORDER — INSULIN LISPRO 100 [IU]/ML
0-8 INJECTION, SOLUTION INTRAVENOUS; SUBCUTANEOUS
Status: DISCONTINUED | OUTPATIENT
Start: 2025-01-09 | End: 2025-01-12 | Stop reason: HOSPADM

## 2025-01-09 RX ORDER — PROPOFOL 10 MG/ML
INJECTION, EMULSION INTRAVENOUS
Status: DISCONTINUED | OUTPATIENT
Start: 2025-01-09 | End: 2025-01-09 | Stop reason: SDUPTHER

## 2025-01-09 RX ORDER — IPRATROPIUM BROMIDE AND ALBUTEROL SULFATE 2.5; .5 MG/3ML; MG/3ML
1 SOLUTION RESPIRATORY (INHALATION)
Status: DISCONTINUED | OUTPATIENT
Start: 2025-01-09 | End: 2025-01-09 | Stop reason: HOSPADM

## 2025-01-09 RX ORDER — REMIFENTANIL HYDROCHLORIDE 1 MG/ML
INJECTION, POWDER, LYOPHILIZED, FOR SOLUTION INTRAVENOUS
Status: DISCONTINUED | OUTPATIENT
Start: 2025-01-09 | End: 2025-01-09 | Stop reason: SDUPTHER

## 2025-01-09 RX ORDER — SODIUM CHLORIDE 0.9 % (FLUSH) 0.9 %
5-40 SYRINGE (ML) INJECTION EVERY 12 HOURS SCHEDULED
Status: DISCONTINUED | OUTPATIENT
Start: 2025-01-09 | End: 2025-01-12 | Stop reason: HOSPADM

## 2025-01-09 RX ORDER — ONDANSETRON 2 MG/ML
4 INJECTION INTRAMUSCULAR; INTRAVENOUS
Status: DISCONTINUED | OUTPATIENT
Start: 2025-01-09 | End: 2025-01-09 | Stop reason: HOSPADM

## 2025-01-09 RX ORDER — DIAZEPAM 5 MG/1
5 TABLET ORAL EVERY 6 HOURS PRN
Status: DISCONTINUED | OUTPATIENT
Start: 2025-01-09 | End: 2025-01-12 | Stop reason: HOSPADM

## 2025-01-09 RX ORDER — MIDAZOLAM HYDROCHLORIDE 1 MG/ML
INJECTION, SOLUTION INTRAMUSCULAR; INTRAVENOUS
Status: DISCONTINUED | OUTPATIENT
Start: 2025-01-09 | End: 2025-01-09 | Stop reason: SDUPTHER

## 2025-01-09 RX ORDER — DEXAMETHASONE 4 MG/1
4 TABLET ORAL EVERY 12 HOURS SCHEDULED
Status: DISCONTINUED | OUTPATIENT
Start: 2025-01-13 | End: 2025-01-12 | Stop reason: HOSPADM

## 2025-01-09 RX ORDER — BUSPIRONE HYDROCHLORIDE 10 MG/1
10 TABLET ORAL 3 TIMES DAILY PRN
Status: DISCONTINUED | OUTPATIENT
Start: 2025-01-09 | End: 2025-01-12 | Stop reason: HOSPADM

## 2025-01-09 RX ORDER — SCOPOLAMINE 1 MG/3D
1 PATCH, EXTENDED RELEASE TRANSDERMAL
Status: DISCONTINUED | OUTPATIENT
Start: 2025-01-09 | End: 2025-01-12 | Stop reason: HOSPADM

## 2025-01-09 RX ORDER — GLYCOPYRROLATE 0.2 MG/ML
INJECTION INTRAMUSCULAR; INTRAVENOUS
Status: DISCONTINUED | OUTPATIENT
Start: 2025-01-09 | End: 2025-01-09 | Stop reason: SDUPTHER

## 2025-01-09 RX ORDER — SODIUM CHLORIDE, SODIUM LACTATE, POTASSIUM CHLORIDE, CALCIUM CHLORIDE 600; 310; 30; 20 MG/100ML; MG/100ML; MG/100ML; MG/100ML
INJECTION, SOLUTION INTRAVENOUS CONTINUOUS
Status: DISCONTINUED | OUTPATIENT
Start: 2025-01-09 | End: 2025-01-09 | Stop reason: HOSPADM

## 2025-01-09 RX ORDER — ACETAMINOPHEN 325 MG/1
650 TABLET ORAL
Status: DISCONTINUED | OUTPATIENT
Start: 2025-01-09 | End: 2025-01-09 | Stop reason: HOSPADM

## 2025-01-09 RX ORDER — HYDROMORPHONE HYDROCHLORIDE 1 MG/ML
0.5 INJECTION, SOLUTION INTRAMUSCULAR; INTRAVENOUS; SUBCUTANEOUS EVERY 5 MIN PRN
Status: DISCONTINUED | OUTPATIENT
Start: 2025-01-09 | End: 2025-01-09 | Stop reason: HOSPADM

## 2025-01-09 RX ORDER — POLYETHYLENE GLYCOL 3350 17 G/17G
17 POWDER, FOR SOLUTION ORAL DAILY
Status: DISCONTINUED | OUTPATIENT
Start: 2025-01-09 | End: 2025-01-12 | Stop reason: HOSPADM

## 2025-01-09 RX ORDER — DEXTROAMPHETAMINE SACCHARATE, AMPHETAMINE ASPARTATE, DEXTROAMPHETAMINE SULFATE AND AMPHETAMINE SULFATE 7.5; 7.5; 7.5; 7.5 MG/1; MG/1; MG/1; MG/1
30 TABLET ORAL 2 TIMES DAILY
Status: DISCONTINUED | OUTPATIENT
Start: 2025-01-10 | End: 2025-01-12 | Stop reason: HOSPADM

## 2025-01-09 RX ORDER — DEXAMETHASONE 4 MG/1
4 TABLET ORAL DAILY
Status: DISCONTINUED | OUTPATIENT
Start: 2025-01-16 | End: 2025-01-12 | Stop reason: HOSPADM

## 2025-01-09 RX ORDER — NALOXONE HYDROCHLORIDE 0.4 MG/ML
INJECTION, SOLUTION INTRAMUSCULAR; INTRAVENOUS; SUBCUTANEOUS PRN
Status: DISCONTINUED | OUTPATIENT
Start: 2025-01-09 | End: 2025-01-09 | Stop reason: HOSPADM

## 2025-01-09 RX ORDER — SODIUM CHLORIDE 9 MG/ML
INJECTION, SOLUTION INTRAVENOUS PRN
Status: DISCONTINUED | OUTPATIENT
Start: 2025-01-09 | End: 2025-01-09 | Stop reason: HOSPADM

## 2025-01-09 RX ORDER — LIDOCAINE HYDROCHLORIDE 20 MG/ML
INJECTION, SOLUTION INTRAVENOUS
Status: DISCONTINUED | OUTPATIENT
Start: 2025-01-09 | End: 2025-01-09 | Stop reason: SDUPTHER

## 2025-01-09 RX ORDER — MAGNESIUM HYDROXIDE/ALUMINUM HYDROXICE/SIMETHICONE 120; 1200; 1200 MG/30ML; MG/30ML; MG/30ML
15 SUSPENSION ORAL EVERY 6 HOURS PRN
Status: DISCONTINUED | OUTPATIENT
Start: 2025-01-09 | End: 2025-01-12 | Stop reason: HOSPADM

## 2025-01-09 RX ORDER — HYDROMORPHONE HYDROCHLORIDE 1 MG/ML
0.5 INJECTION, SOLUTION INTRAMUSCULAR; INTRAVENOUS; SUBCUTANEOUS
Status: DISPENSED | OUTPATIENT
Start: 2025-01-09 | End: 2025-01-11

## 2025-01-09 RX ORDER — SODIUM CHLORIDE 0.9 % (FLUSH) 0.9 %
5-40 SYRINGE (ML) INJECTION PRN
Status: DISCONTINUED | OUTPATIENT
Start: 2025-01-09 | End: 2025-01-09 | Stop reason: HOSPADM

## 2025-01-09 RX ORDER — GLUCAGON 1 MG/ML
1 KIT INJECTION PRN
Status: DISCONTINUED | OUTPATIENT
Start: 2025-01-09 | End: 2025-01-12 | Stop reason: HOSPADM

## 2025-01-09 RX ORDER — ONDANSETRON 2 MG/ML
4 INJECTION INTRAMUSCULAR; INTRAVENOUS EVERY 6 HOURS PRN
Status: DISCONTINUED | OUTPATIENT
Start: 2025-01-09 | End: 2025-01-12 | Stop reason: HOSPADM

## 2025-01-09 RX ADMIN — SODIUM CHLORIDE, PRESERVATIVE FREE 10 ML: 5 INJECTION INTRAVENOUS at 22:11

## 2025-01-09 RX ADMIN — SODIUM CHLORIDE, SODIUM LACTATE, POTASSIUM CHLORIDE, AND CALCIUM CHLORIDE: .6; .31; .03; .02 INJECTION, SOLUTION INTRAVENOUS at 07:30

## 2025-01-09 RX ADMIN — REMIFENTANIL HYDROCHLORIDE 0.2 MCG/KG/MIN: 1 INJECTION, POWDER, LYOPHILIZED, FOR SOLUTION INTRAVENOUS at 07:43

## 2025-01-09 RX ADMIN — DEXAMETHASONE SODIUM PHOSPHATE 10 MG: 4 INJECTION INTRA-ARTICULAR; INTRALESIONAL; INTRAMUSCULAR; INTRAVENOUS; SOFT TISSUE at 08:26

## 2025-01-09 RX ADMIN — PROCHLORPERAZINE EDISYLATE 10 MG: 5 INJECTION INTRAMUSCULAR; INTRAVENOUS at 21:41

## 2025-01-09 RX ADMIN — WATER 2000 MG: 1 INJECTION INTRAMUSCULAR; INTRAVENOUS; SUBCUTANEOUS at 21:41

## 2025-01-09 RX ADMIN — HYDROMORPHONE HYDROCHLORIDE 0.5 MG: 2 INJECTION, SOLUTION INTRAMUSCULAR; INTRAVENOUS; SUBCUTANEOUS at 08:28

## 2025-01-09 RX ADMIN — HYDROMORPHONE HYDROCHLORIDE 0.5 MG: 1 INJECTION, SOLUTION INTRAMUSCULAR; INTRAVENOUS; SUBCUTANEOUS at 18:44

## 2025-01-09 RX ADMIN — PROPOFOL 50 MG: 10 INJECTION, EMULSION INTRAVENOUS at 08:35

## 2025-01-09 RX ADMIN — PROPOFOL 40 MG: 10 INJECTION, EMULSION INTRAVENOUS at 08:31

## 2025-01-09 RX ADMIN — PROPOFOL 50 MG: 10 INJECTION, EMULSION INTRAVENOUS at 14:46

## 2025-01-09 RX ADMIN — MIDAZOLAM HYDROCHLORIDE 2 MG: 1 INJECTION, SOLUTION INTRAMUSCULAR; INTRAVENOUS at 07:28

## 2025-01-09 RX ADMIN — HYDROMORPHONE HYDROCHLORIDE 0.5 MG: 1 INJECTION, SOLUTION INTRAMUSCULAR; INTRAVENOUS; SUBCUTANEOUS at 17:13

## 2025-01-09 RX ADMIN — HYDROMORPHONE HYDROCHLORIDE 0.5 MG: 1 INJECTION, SOLUTION INTRAMUSCULAR; INTRAVENOUS; SUBCUTANEOUS at 22:27

## 2025-01-09 RX ADMIN — HYDROMORPHONE HYDROCHLORIDE 0.5 MG: 2 INJECTION, SOLUTION INTRAMUSCULAR; INTRAVENOUS; SUBCUTANEOUS at 15:13

## 2025-01-09 RX ADMIN — SODIUM CHLORIDE: 9 INJECTION, SOLUTION INTRAVENOUS at 07:30

## 2025-01-09 RX ADMIN — PROPOFOL 175 MCG/KG/MIN: 10 INJECTION, EMULSION INTRAVENOUS at 07:43

## 2025-01-09 RX ADMIN — SODIUM CHLORIDE, POTASSIUM CHLORIDE, SODIUM LACTATE AND CALCIUM CHLORIDE: 600; 310; 30; 20 INJECTION, SOLUTION INTRAVENOUS at 06:56

## 2025-01-09 RX ADMIN — SUCCINYLCHOLINE CHLORIDE 200 MG: 20 INJECTION, SOLUTION INTRAMUSCULAR; INTRAVENOUS at 07:41

## 2025-01-09 RX ADMIN — GLYCOPYRROLATE 0.2 MG: 0.2 INJECTION INTRAMUSCULAR; INTRAVENOUS at 08:28

## 2025-01-09 RX ADMIN — WATER 2000 MG: 1 INJECTION INTRAMUSCULAR; INTRAVENOUS; SUBCUTANEOUS at 07:46

## 2025-01-09 RX ADMIN — PHENYLEPHRINE HYDROCHLORIDE 50 MCG: 10 INJECTION INTRAVENOUS at 14:36

## 2025-01-09 RX ADMIN — FENTANYL CITRATE 25 MCG: 50 INJECTION INTRAMUSCULAR; INTRAVENOUS at 19:05

## 2025-01-09 RX ADMIN — HYDROMORPHONE HYDROCHLORIDE 0.5 MG: 2 INJECTION, SOLUTION INTRAMUSCULAR; INTRAVENOUS; SUBCUTANEOUS at 08:32

## 2025-01-09 RX ADMIN — ONDANSETRON 4 MG: 2 INJECTION INTRAMUSCULAR; INTRAVENOUS at 13:50

## 2025-01-09 RX ADMIN — PHENYLEPHRINE HYDROCHLORIDE 30 MCG/MIN: 10 INJECTION INTRAVENOUS at 07:43

## 2025-01-09 RX ADMIN — HYDROMORPHONE HYDROCHLORIDE 0.5 MG: 2 INJECTION, SOLUTION INTRAMUSCULAR; INTRAVENOUS; SUBCUTANEOUS at 08:35

## 2025-01-09 RX ADMIN — HYDROMORPHONE HYDROCHLORIDE 0.5 MG: 2 INJECTION, SOLUTION INTRAMUSCULAR; INTRAVENOUS; SUBCUTANEOUS at 14:46

## 2025-01-09 RX ADMIN — LIDOCAINE HYDROCHLORIDE 50 MG: 20 INJECTION, SOLUTION INTRAVENOUS at 07:41

## 2025-01-09 RX ADMIN — PROPOFOL 200 MG: 10 INJECTION, EMULSION INTRAVENOUS at 07:41

## 2025-01-09 RX ADMIN — WATER 2000 MG: 1 INJECTION INTRAMUSCULAR; INTRAVENOUS; SUBCUTANEOUS at 11:50

## 2025-01-09 RX ADMIN — GLYCOPYRROLATE 0.2 MG: 0.2 INJECTION INTRAMUSCULAR; INTRAVENOUS at 08:34

## 2025-01-09 RX ADMIN — HYDROMORPHONE HYDROCHLORIDE 0.5 MG: 2 INJECTION, SOLUTION INTRAMUSCULAR; INTRAVENOUS; SUBCUTANEOUS at 07:30

## 2025-01-09 ASSESSMENT — PAIN DESCRIPTION - ORIENTATION
ORIENTATION: LEFT

## 2025-01-09 ASSESSMENT — PAIN DESCRIPTION - LOCATION
LOCATION: HEAD
LOCATION: HEAD;EAR

## 2025-01-09 ASSESSMENT — PAIN SCALES - GENERAL
PAINLEVEL_OUTOF10: 8
PAINLEVEL_OUTOF10: 5
PAINLEVEL_OUTOF10: 0
PAINLEVEL_OUTOF10: 8
PAINLEVEL_OUTOF10: 2
PAINLEVEL_OUTOF10: 2
PAINLEVEL_OUTOF10: 0
PAINLEVEL_OUTOF10: 5
PAINLEVEL_OUTOF10: 8

## 2025-01-09 ASSESSMENT — PAIN DESCRIPTION - DESCRIPTORS
DESCRIPTORS: ACHING;DISCOMFORT;PRESSURE
DESCRIPTORS: ACHING
DESCRIPTORS: ACHING;SORE
DESCRIPTORS: ACHING
DESCRIPTORS: ACHING;SORE
DESCRIPTORS: ACHING;SORE
DESCRIPTORS: ACHING

## 2025-01-09 ASSESSMENT — PAIN - FUNCTIONAL ASSESSMENT
PAIN_FUNCTIONAL_ASSESSMENT: ACTIVITIES ARE NOT PREVENTED
PAIN_FUNCTIONAL_ASSESSMENT: PREVENTS OR INTERFERES SOME ACTIVE ACTIVITIES AND ADLS

## 2025-01-09 ASSESSMENT — PAIN DESCRIPTION - PAIN TYPE
TYPE: SURGICAL PAIN
TYPE: ACUTE PAIN;SURGICAL PAIN

## 2025-01-09 NOTE — ANESTHESIA PRE PROCEDURE
Department of Anesthesiology  Preprocedure Note       Name:  Juan Antonio Patel Jr.   Age:  44 y.o.  :  1980                                          MRN:  [id number]         Date:  2025      Surgeon: Surgeon(s):  Rodolfo Wells MD Dinapoli, Vincent A., MD Gordon, Steven A, MD    Procedure: Procedure(s):  LEFT TRANSLABYRINTHINE APPROACH FOR RESECTION OF VESTIBULAR SCHWANNOMA/ ABDOMINAL FAT GRAFT  LEFT TRANSLABYRINTHINE APPROACH FOR RESECTION OF VESTIBULAR SCHWANNOMA, ABDOMINAL FAT GRAFT, FASCIA GRAFT    Medications prior to admission:   Prior to Admission medications    Medication Sig Start Date End Date Taking? Authorizing Provider   busPIRone (BUSPAR) 10 MG tablet TAKE 1.5 TABLETS BY MOUTH 3 TIMES A DAY AS NEEDED for anxiety  Patient taking differently: Take 1 tablet by mouth 3 times daily as needed (anxiety) TAKE 1.5 TABLETS BY MOUTH 3 TIMES A DAY AS NEEDED for anxiety 24  Yes Bernie Mello APRN - CNP   zolpidem (AMBIEN) 5 MG tablet TAKE 1 TABLET BY MOUTH AT NIGHT AS NEEDED FOR SLEEP. MAX 1 TAB IN 24 HOURS  Patient taking differently: Take 1 tablet by mouth nightly as needed for Sleep. TAKE 1 TABLET BY MOUTH AT NIGHT AS NEEDED FOR SLEEP. MAX 1 TAB IN 24 HOURS 24 Yes Bernie Mello APRN - CNP   amphetamine-dextroamphetamine (ADDERALL, 30MG,) 30 MG tablet Take 1 tablet by mouth 2 times daily for 30 days. 12/16/24 1/15/25 Yes Bernie Mello APRN - CNP   levothyroxine (SYNTHROID) 200 MCG tablet Take 1 tablet by mouth daily 12/3/24  Yes Keith Calles MD   levothyroxine (SYNTHROID) 25 MCG tablet One tab PO daily  Patient taking differently: Take 1 tablet by mouth Daily One tab PO daily 12/3/24  Yes Keith Calles MD   naltrexone (DEPADE) 50 MG tablet Take 0.5 tablets by mouth in the morning and at bedtime   Yes Romi Farooq MD   buPROPion (WELLBUTRIN SR) 200 MG extended release tablet Take 1 tablet by mouth twice daily  Patient taking differently: Take

## 2025-01-09 NOTE — ANESTHESIA PROCEDURE NOTES
Arterial Line:    An arterial line was placed using surface landmarks, in the OR for the following indication(s): continuous blood pressure monitoring and blood sampling needed.    A 20 gauge (size) (length), Arrow (type) catheter was placed, Seldinger technique used, into the right radial artery, secured by suture, tape and Tegaderm.    Events:  patient tolerated procedure well with no complications and EBL < 5mL.    Additional notes:  Easy placement with one attempt required1/9/2025 7:45 AM1/9/2025 7:50 AM  Performed: Resident/CRNA   Preanesthetic Checklist  Completed: patient identified, IV checked, site marked, risks and benefits discussed, surgical/procedural consents, equipment checked, pre-op evaluation, timeout performed, anesthesia consent given, oxygen available, monitors applied/VS acknowledged, fire risk safety assessment completed and verbalized and blood product R/B/A discussed and consented

## 2025-01-09 NOTE — PROGRESS NOTES
From OR sedated, dressing on head CDI, incision on abdomen JOE with skin glue CDI, VSS.    Report from CRNA, OR RN and Dr. Zavala.    S/P LEFT TRANSLABYRINTHINE APPROACH FOR RESECTION OF VESTIBULAR SCHWANNOMA/ ABDOMINAL FAT GRAFT - Left   General  LEFT TRANSLABYRINTHINE APPROACH FOR RESECTION OF VESTIBULAR SCHWANNOMA, ABDOMINAL FAT GRAFT, FASCIA GRAFT - Left, General

## 2025-01-09 NOTE — ANESTHESIA POSTPROCEDURE EVALUATION
Department of Anesthesiology  Postprocedure Note    Patient: Juan Antonio Patel Jr.  MRN: [id number]  YOB: 1980  Date of evaluation: 1/9/2025    Procedure Summary       Date: 01/09/25 Room / Location: [place] [address] / [facility]    Anesthesia Start: 0730 Anesthesia Stop: 1508    Procedures:       LEFT TRANSLABYRINTHINE APPROACH FOR RESECTION OF VESTIBULAR SCHWANNOMA/ ABDOMINAL FAT GRAFT (Left: Head)      LEFT TRANSLABYRINTHINE APPROACH FOR RESECTION OF VESTIBULAR SCHWANNOMA, ABDOMINAL FAT GRAFT, FASCIA GRAFT (Left: Head) Diagnosis:       Unilateral vestibular schwannoma (HCC)      (Unilateral vestibular schwannoma (HCC) [D33.3])    Surgeons: Rodolfo Wells MD Responsible Provider: Twila Salter DO    Anesthesia Type: general, TIVA ASA Status: 3            Anesthesia Type: No value filed.    Bud Phase I: Bud Score: 10    Bud Phase II:      Anesthesia Post Evaluation    Patient location during evaluation: PACU  Patient participation: complete - patient participated  Level of consciousness: awake  Pain score: 0  Nausea & Vomiting: no nausea and no vomiting  Cardiovascular status: blood pressure returned to baseline  Respiratory status: acceptable  Hydration status: euvolemic  Pain management: adequate    No notable events documented.

## 2025-01-09 NOTE — H&P
Date of Surgery Update    Juan Antonio Huffkarina Gilbert was seen and examined.     There have been no significant clinical changes since the completion of the History and Physical.    Patient identified by surgeon. Surgical site was confirmed by patient and surgeon.  Surgical site marked.    Rodolfo Wells MD, PhD  [address], [address]  [place], [place], [address] [phone] (c), [phone] (o)

## 2025-01-10 PROBLEM — Z98.890 S/P CRANIOTOMY: Status: ACTIVE | Noted: 2025-01-10

## 2025-01-10 LAB
ANION GAP SERPL CALCULATED.3IONS-SCNC: 8 MMOL/L (ref 3–16)
BASOPHILS # BLD: 0 K/UL (ref 0–0.2)
BASOPHILS NFR BLD: 0.2 %
BUN SERPL-MCNC: 13 MG/DL (ref 7–20)
CALCIUM SERPL-MCNC: 8.5 MG/DL (ref 8.3–10.6)
CHLORIDE SERPL-SCNC: 110 MMOL/L (ref 99–110)
CO2 SERPL-SCNC: 23 MMOL/L (ref 21–32)
CREAT SERPL-MCNC: 0.8 MG/DL (ref 0.9–1.3)
DEPRECATED RDW RBC AUTO: 13.5 % (ref 12.4–15.4)
EOSINOPHIL # BLD: 0 K/UL (ref 0–0.6)
EOSINOPHIL NFR BLD: 0 %
GFR SERPLBLD CREATININE-BSD FMLA CKD-EPI: >90 ML/MIN/{1.73_M2}
GLUCOSE BLD-MCNC: 129 MG/DL (ref 70–99)
GLUCOSE BLD-MCNC: 131 MG/DL (ref 70–99)
GLUCOSE BLD-MCNC: 134 MG/DL (ref 70–99)
GLUCOSE BLD-MCNC: 144 MG/DL (ref 70–99)
GLUCOSE BLD-MCNC: 150 MG/DL (ref 70–99)
GLUCOSE SERPL-MCNC: 136 MG/DL (ref 70–99)
HCT VFR BLD AUTO: 38.8 % (ref 40.5–52.5)
HGB BLD-MCNC: 13 G/DL (ref 13.5–17.5)
LYMPHOCYTES # BLD: 1.6 K/UL (ref 1–5.1)
LYMPHOCYTES NFR BLD: 12.5 %
MCH RBC QN AUTO: 29.4 PG (ref 26–34)
MCHC RBC AUTO-ENTMCNC: 33.5 G/DL (ref 31–36)
MCV RBC AUTO: 88 FL (ref 80–100)
MONOCYTES # BLD: 0.9 K/UL (ref 0–1.3)
MONOCYTES NFR BLD: 7.4 %
NEUTROPHILS # BLD: 10 K/UL (ref 1.7–7.7)
NEUTROPHILS NFR BLD: 79.9 %
PERFORMED ON: ABNORMAL
PLATELET # BLD AUTO: 187 K/UL (ref 135–450)
PMV BLD AUTO: 8.2 FL (ref 5–10.5)
POTASSIUM SERPL-SCNC: 4.1 MMOL/L (ref 3.5–5.1)
RBC # BLD AUTO: 4.41 M/UL (ref 4.2–5.9)
SODIUM SERPL-SCNC: 141 MMOL/L (ref 136–145)
WBC # BLD AUTO: 12.5 K/UL (ref 4–11)

## 2025-01-10 PROCEDURE — 85025 COMPLETE CBC W/AUTO DIFF WBC: CPT

## 2025-01-10 PROCEDURE — 2000000000 HC ICU R&B

## 2025-01-10 PROCEDURE — 6370000000 HC RX 637 (ALT 250 FOR IP): Performed by: NEUROLOGICAL SURGERY

## 2025-01-10 PROCEDURE — 92610 EVALUATE SWALLOWING FUNCTION: CPT

## 2025-01-10 PROCEDURE — 2500000003 HC RX 250 WO HCPCS: Performed by: NEUROLOGICAL SURGERY

## 2025-01-10 PROCEDURE — 80048 BASIC METABOLIC PNL TOTAL CA: CPT

## 2025-01-10 PROCEDURE — 97162 PT EVAL MOD COMPLEX 30 MIN: CPT

## 2025-01-10 PROCEDURE — APPNB45 APP NON BILLABLE 31-45 MINUTES: Performed by: NURSE PRACTITIONER

## 2025-01-10 PROCEDURE — 6370000000 HC RX 637 (ALT 250 FOR IP)

## 2025-01-10 PROCEDURE — 92523 SPEECH SOUND LANG COMPREHEN: CPT

## 2025-01-10 PROCEDURE — 6360000002 HC RX W HCPCS: Performed by: NEUROLOGICAL SURGERY

## 2025-01-10 PROCEDURE — 6370000000 HC RX 637 (ALT 250 FOR IP): Performed by: OTOLARYNGOLOGY

## 2025-01-10 PROCEDURE — 97530 THERAPEUTIC ACTIVITIES: CPT

## 2025-01-10 PROCEDURE — 97166 OT EVAL MOD COMPLEX 45 MIN: CPT

## 2025-01-10 PROCEDURE — 99024 POSTOP FOLLOW-UP VISIT: CPT | Performed by: NURSE PRACTITIONER

## 2025-01-10 RX ORDER — ONDANSETRON 4 MG/1
4 TABLET, ORALLY DISINTEGRATING ORAL 3 TIMES DAILY PRN
Qty: 21 TABLET | Refills: 0 | Status: SHIPPED | OUTPATIENT
Start: 2025-01-10

## 2025-01-10 RX ORDER — SCOPOLAMINE 1 MG/3D
1 PATCH, EXTENDED RELEASE TRANSDERMAL
Qty: 3 PATCH | Refills: 0 | Status: SHIPPED | OUTPATIENT
Start: 2025-01-12

## 2025-01-10 RX ORDER — OXYCODONE HYDROCHLORIDE 5 MG/1
5 TABLET ORAL EVERY 6 HOURS PRN
Qty: 28 TABLET | Refills: 0 | Status: SHIPPED | OUTPATIENT
Start: 2025-01-10 | End: 2025-01-17

## 2025-01-10 RX ORDER — MINERAL OIL AND PETROLATUM 150; 830 MG/G; MG/G
OINTMENT OPHTHALMIC PRN
Status: DISCONTINUED | OUTPATIENT
Start: 2025-01-10 | End: 2025-01-12 | Stop reason: HOSPADM

## 2025-01-10 RX ORDER — SENNA AND DOCUSATE SODIUM 50; 8.6 MG/1; MG/1
1 TABLET, FILM COATED ORAL 2 TIMES DAILY
COMMUNITY
Start: 2025-01-10

## 2025-01-10 RX ORDER — PANTOPRAZOLE SODIUM 40 MG/1
40 TABLET, DELAYED RELEASE ORAL
Qty: 30 TABLET | Refills: 0 | Status: SHIPPED | OUTPATIENT
Start: 2025-01-11

## 2025-01-10 RX ORDER — POLYETHYLENE GLYCOL 3350 17 G/17G
17 POWDER, FOR SOLUTION ORAL DAILY
COMMUNITY
Start: 2025-01-11 | End: 2025-02-10

## 2025-01-10 RX ORDER — DEXAMETHASONE 4 MG/1
TABLET ORAL
Qty: 16 TABLET | Refills: 0 | Status: SHIPPED | OUTPATIENT
Start: 2025-01-10 | End: 2025-01-17

## 2025-01-10 RX ADMIN — SENNOSIDES AND DOCUSATE SODIUM 1 TABLET: 50; 8.6 TABLET ORAL at 09:15

## 2025-01-10 RX ADMIN — HEPARIN SODIUM 5000 UNITS: 5000 INJECTION INTRAVENOUS; SUBCUTANEOUS at 21:14

## 2025-01-10 RX ADMIN — DEXAMETHASONE 4 MG: 4 TABLET ORAL at 00:20

## 2025-01-10 RX ADMIN — MINERAL OIL, PETROLATUM: 425; 573 OINTMENT OPHTHALMIC at 21:59

## 2025-01-10 RX ADMIN — TOPIRAMATE 50 MG: 25 TABLET, FILM COATED ORAL at 00:21

## 2025-01-10 RX ADMIN — HYDROMORPHONE HYDROCHLORIDE 0.5 MG: 1 INJECTION, SOLUTION INTRAMUSCULAR; INTRAVENOUS; SUBCUTANEOUS at 11:01

## 2025-01-10 RX ADMIN — LEVOTHYROXINE SODIUM 200 MCG: 0.2 TABLET ORAL at 05:37

## 2025-01-10 RX ADMIN — BUSPIRONE HYDROCHLORIDE 10 MG: 5 TABLET ORAL at 00:21

## 2025-01-10 RX ADMIN — DEXAMETHASONE 4 MG: 4 TABLET ORAL at 05:38

## 2025-01-10 RX ADMIN — BUPROPION HYDROCHLORIDE 200 MG: 100 TABLET, FILM COATED, EXTENDED RELEASE ORAL at 09:15

## 2025-01-10 RX ADMIN — SENNOSIDES AND DOCUSATE SODIUM 1 TABLET: 50; 8.6 TABLET ORAL at 21:14

## 2025-01-10 RX ADMIN — ONDANSETRON 4 MG: 2 INJECTION INTRAMUSCULAR; INTRAVENOUS at 00:31

## 2025-01-10 RX ADMIN — HYDROMORPHONE HYDROCHLORIDE 0.5 MG: 1 INJECTION, SOLUTION INTRAMUSCULAR; INTRAVENOUS; SUBCUTANEOUS at 02:58

## 2025-01-10 RX ADMIN — BUPROPION HYDROCHLORIDE 200 MG: 100 TABLET, FILM COATED, EXTENDED RELEASE ORAL at 21:14

## 2025-01-10 RX ADMIN — WATER 2000 MG: 1 INJECTION INTRAMUSCULAR; INTRAVENOUS; SUBCUTANEOUS at 13:13

## 2025-01-10 RX ADMIN — ONDANSETRON 4 MG: 2 INJECTION INTRAMUSCULAR; INTRAVENOUS at 21:16

## 2025-01-10 RX ADMIN — DEXAMETHASONE 4 MG: 4 TABLET ORAL at 12:16

## 2025-01-10 RX ADMIN — DIAZEPAM 5 MG: 5 TABLET ORAL at 11:01

## 2025-01-10 RX ADMIN — WATER 2000 MG: 1 INJECTION INTRAMUSCULAR; INTRAVENOUS; SUBCUTANEOUS at 05:37

## 2025-01-10 RX ADMIN — DEXAMETHASONE 4 MG: 4 TABLET ORAL at 17:04

## 2025-01-10 RX ADMIN — PANTOPRAZOLE SODIUM 40 MG: 40 TABLET, DELAYED RELEASE ORAL at 05:38

## 2025-01-10 RX ADMIN — TOPIRAMATE 50 MG: 25 TABLET, FILM COATED ORAL at 09:15

## 2025-01-10 RX ADMIN — SODIUM CHLORIDE, PRESERVATIVE FREE 10 ML: 5 INJECTION INTRAVENOUS at 09:15

## 2025-01-10 RX ADMIN — OXYCODONE 10 MG: 5 TABLET ORAL at 07:42

## 2025-01-10 RX ADMIN — BUPROPION HYDROCHLORIDE 200 MG: 100 TABLET, FILM COATED, EXTENDED RELEASE ORAL at 00:20

## 2025-01-10 RX ADMIN — OXYCODONE 5 MG: 5 TABLET ORAL at 21:13

## 2025-01-10 RX ADMIN — POLYETHYLENE GLYCOL 3350 17 G: 17 POWDER, FOR SOLUTION ORAL at 09:15

## 2025-01-10 RX ADMIN — OXYCODONE 10 MG: 5 TABLET ORAL at 17:04

## 2025-01-10 RX ADMIN — WATER 2000 MG: 1 INJECTION INTRAMUSCULAR; INTRAVENOUS; SUBCUTANEOUS at 21:14

## 2025-01-10 RX ADMIN — TOPIRAMATE 50 MG: 25 TABLET, FILM COATED ORAL at 21:14

## 2025-01-10 RX ADMIN — DEXTROAMPHETAMINE SACCHARATE, AMPHETAMINE ASPARTATE, DEXTROAMPHETAMINE SULFATE AND AMPHETAMINE SULFATE 30 MG: 7.5; 7.5; 7.5; 7.5 TABLET ORAL at 09:15

## 2025-01-10 ASSESSMENT — PAIN DESCRIPTION - DESCRIPTORS
DESCRIPTORS: ACHING;DISCOMFORT
DESCRIPTORS: ACHING
DESCRIPTORS: ACHING;DISCOMFORT

## 2025-01-10 ASSESSMENT — PAIN DESCRIPTION - LOCATION
LOCATION: HEAD

## 2025-01-10 ASSESSMENT — PAIN DESCRIPTION - ORIENTATION
ORIENTATION: POSTERIOR
ORIENTATION: LEFT
ORIENTATION: LEFT;POSTERIOR
ORIENTATION: POSTERIOR

## 2025-01-10 ASSESSMENT — PAIN SCALES - GENERAL
PAINLEVEL_OUTOF10: 2
PAINLEVEL_OUTOF10: 2
PAINLEVEL_OUTOF10: 3
PAINLEVEL_OUTOF10: 2
PAINLEVEL_OUTOF10: 2
PAINLEVEL_OUTOF10: 7
PAINLEVEL_OUTOF10: 3
PAINLEVEL_OUTOF10: 2
PAINLEVEL_OUTOF10: 4
PAINLEVEL_OUTOF10: 7
PAINLEVEL_OUTOF10: 6
PAINLEVEL_OUTOF10: 0
PAINLEVEL_OUTOF10: 7

## 2025-01-10 ASSESSMENT — PAIN - FUNCTIONAL ASSESSMENT
PAIN_FUNCTIONAL_ASSESSMENT: ACTIVITIES ARE NOT PREVENTED
PAIN_FUNCTIONAL_ASSESSMENT: PREVENTS OR INTERFERES SOME ACTIVE ACTIVITIES AND ADLS
PAIN_FUNCTIONAL_ASSESSMENT: ACTIVITIES ARE NOT PREVENTED
PAIN_FUNCTIONAL_ASSESSMENT: PREVENTS OR INTERFERES SOME ACTIVE ACTIVITIES AND ADLS
PAIN_FUNCTIONAL_ASSESSMENT: ACTIVITIES ARE NOT PREVENTED

## 2025-01-10 ASSESSMENT — PAIN DESCRIPTION - ONSET
ONSET: ON-GOING
ONSET: ON-GOING

## 2025-01-10 ASSESSMENT — PAIN DESCRIPTION - FREQUENCY
FREQUENCY: CONTINUOUS
FREQUENCY: CONTINUOUS

## 2025-01-10 ASSESSMENT — PAIN DESCRIPTION - PAIN TYPE
TYPE: SURGICAL PAIN
TYPE: SURGICAL PAIN

## 2025-01-10 NOTE — PROGRESS NOTES
4 Eyes Skin Assessment     NAME:  Juan Antonio Patel Jr.  YOB: 1980  MEDICAL RECORD NUMBER:  [id number]    The patient is being assessed for  Admission    I agree that at least one RN has performed a thorough Head to Toe Skin Assessment on the patient. ALL assessment sites listed below have been assessed.      Areas assessed by both nurses:    Head, Face, Ears, Shoulders, Back, Chest, Arms, Elbows, Hands, Sacrum. Buttock, Coccyx, Ischium, Legs. Feet and Heels, and Under Medical Devices         Does the Patient have a Wound? No noted wound(s)       Tavo Prevention initiated by RN: No  Wound Care Orders initiated by RN: No    Pressure Injury (Stage 3,4, Unstageable, DTI, NWPT, and Complex wounds) if present, place Wound referral order by RN under : No    New Ostomies, if present place, Ostomy referral order under : No     Nurse 1 eSignature: Electronically signed by Jacquelyn Lux RN on 1/9/25 at 10:45 PM EST    **SHARE this note so that the co-signing nurse can place an eSignature**    Nurse 2 eSignature: Electronically signed by Jean Rowley RN on 1/10/25 at 10:35 AM EST

## 2025-01-10 NOTE — PROGRESS NOTES
Occupational Therapy  Facility/Department: [facility] ICU  Occupational Therapy Initial Assessment    Name: Juan Antonio Patel Jr.  : 1980  MRN: [id number]  Date of Service: 1/10/2025    Discharge Recommendations:  Continue to assess pending progress, 24 hour supervision or assist, Home with Home health OT   OT Equipment Recommendations  Other: cont to assess       Patient Diagnosis(es): The primary encounter diagnosis was S/P craniotomy. A diagnosis of Unilateral vestibular schwannoma (HCC) was also pertinent to this visit.  Past Medical History:  has a past medical history of Acoustic neuroma (HCC), ADHD (attention deficit hyperactivity disorder), Anxiety, Chronic back pain, Depression, Hearing loss, Hemiplegic migraine, Hypothyroidism, Irritable, Nephrolith, Schwannoma, Sleep apnea, and Thyroid disease.  Past Surgical History:  has a past surgical history that includes [place] tooth extraction; Thyroidectomy, partial (2015); hernia repair (2016); Vasectomy (N/A, ); Upper gastrointestinal endoscopy (2018); pr laps [facility] rstrictiv px longitudinal gastrectomy (N/A, 10/08/2018); and Neuroma surgery (Left, 2025).           Assessment  Performance deficits / Impairments: Decreased functional mobility ;Decreased ADL status;Decreased endurance  Assessment: From home with family.  Admit for resection of vestibulare schwannoma. Pt completing mobility with Lui this date for bed to chair. Declined further ambulation at this time. Slow and guarded for transfer but fairly steady. Pt limited by pain and dizziness, BP unremarkable. Pt would benefit from cont therapies while in acute care.  Decision Making: Medium Complexity  REQUIRES OT FOLLOW-UP: Yes  Activity Tolerance  Activity Tolerance: Patient Tolerated treatment well;Patient limited by fatigue;Patient limited by pain     Plan  Occupational Therapy Plan  Times Per Week: 5-7    Restrictions  Restrictions/Precautions  Activity Level: Up as  CK    Tinneti Score       Goals  Short Term Goals  Time Frame for Short Term Goals: dc  Short Term Goal 1: supine to sit with SPVN  Short Term Goal 2: toielting with SPVN  Short Term Goal 3: UB/LB dressing with SBA  Short Term Goal 4: grooming in stance with SBA      Therapy Time   Individual Concurrent Group Co-treatment   Time In 0959         Time Out 1027         Minutes 28             Timed Code Treatment Minutes:   13    Total Treatment Minutes:  28      Karen Thompson, MOT, OTR/L, CNS

## 2025-01-10 NOTE — PROGRESS NOTES
Patient in chair resting, neuro remains unchanged, will continue to assess as ordered.    Electronically signed by Nikkie Rush RN on 1/10/2025 at 2:51 PM

## 2025-01-10 NOTE — PROGRESS NOTES
PACU Transfer to Floor Note    Procedure(s):  LEFT TRANSLABYRINTHINE APPROACH FOR RESECTION OF VESTIBULAR SCHWANNOMA/ ABDOMINAL FAT GRAFT  LEFT TRANSLABYRINTHINE APPROACH FOR RESECTION OF VESTIBULAR SCHWANNOMA, ABDOMINAL FAT GRAFT, FASCIA GRAFT    Current Allergies: Hazelnut and Other    Pt meets criteria as per Gosia Score and ASPAN Standards to transfer to next phase of care.     Recent Labs     01/09/25  1735   POCGLU 134*       Vitals:    01/09/25 2015   BP: 121/89   Pulse: 99   Resp: 19   Temp: 98.5 °F (36.9 °C)   SpO2: 97%     Vitals within 20% of pt's admission vitals as per GOSIA SCORE    SpO2: 97 %    O2 Flow Rate (L/min): 1 L/min      Intake/Output Summary (Last 24 hours) at 1/9/2025 2033  Last data filed at 1/9/2025 1700  Gross per 24 hour   Intake 2700 ml   Output 1525 ml   Net 1175 ml       Pain assessment:  present - adequately treated    Pain Level: 2    Patient was assessed for alterations to skin integrity. There were not alterations observed.    Is patient incontinent: yes    Handoff report given at bedside.   Family updated and directed to pt room      1/9/2025 8:33 PM

## 2025-01-10 NOTE — PROGRESS NOTES
PROGRESS NOTE        Hospital Day: 2  S/p left tranlabyrinthine resection to vestibular schwannoma 1/9/25    Subjective:  Some nausea overnight and vertigo \"not too bad\".  Resting comfortably at time of examination.    Physical Exam:    Vitals:    01/10/25 0258   BP:    Pulse:    Resp: 18   Temp:    SpO2:        Neuro Exam  The patient is well-appearing and is in no acute distress.   Alert and oriented ×4, appropriate, conversant with normal mood and affect.  HB6/6 on left  HB 1/6 on right  Dressing in place; inferior limb of incision without erythema, flat    Capillary refill is less than 2 s in all 4 extremities, pulses are strong and symmetric. Sensation is intact to light touch and temperature throughout.     CT Head 1/9/25 with interval translab bony changes, pneumocephalus        Assessment:   S/p left tranlabyrinthine resection to vestibular schwannoma 1/9/25     Active Hospital Problems    Diagnosis Date Noted    Vestibular schwannoma (HCC) [D33.3] 01/09/2025     Plan:  Lacrilube,  humidification chamber to left eye   Advance diet as tolerated  Continue pain control with oxycodone, tylenol, IV for breakthrough  Decadron taper  Ancef  DVT prophylaxis, SCDs and hepqrin  PT/OT for evaluation, ambulate with assist   MRI today             Bernadette Zavala MD

## 2025-01-10 NOTE — PLAN OF CARE
Problem: SLP Adult - Impaired Swallowing  Goal: By Discharge: Advance to least restrictive diet without signs or symptoms of aspiration for planned discharge setting.  See evaluation for individualized goals.  Outcome: Progressing     Problem: SLP Adult - Impaired Communication  Goal: By Discharge: Demonstrates communication skills at highest level of function for planned discharge setting.  See evaluation for individualized goals.  1/10/2025 1013 by Theresa Moralez, SLP  Outcome: Progressing

## 2025-01-10 NOTE — PROGRESS NOTES
Physical Therapy  Facility/Department: [facility] ICU  Physical Therapy Initial Assessment    Name: Juan Antonio Patel Jr.  : 1980  MRN: [id number]  Date of Service: 1/10/2025    Discharge Recommendations:  24 hour supervision or assist, Outpatient PT, Continue to assess pending progress   PT Equipment Recommendations  Other: continue to assess, pt only owns crutches      Patient Diagnosis(es): The primary encounter diagnosis was S/P craniotomy. A diagnosis of Unilateral vestibular schwannoma (HCC) was also pertinent to this visit.  Past Medical History:  has a past medical history of Acoustic neuroma (HCC), ADHD (attention deficit hyperactivity disorder), Anxiety, Chronic back pain, Depression, Hearing loss, Hemiplegic migraine, Hypothyroidism, Irritable, Nephrolith, Schwannoma, Sleep apnea, and Thyroid disease.  Past Surgical History:  has a past surgical history that includes [place] tooth extraction; Thyroidectomy, partial (2015); hernia repair (2016); Vasectomy (N/A, ); Upper gastrointestinal endoscopy (2018); pr laps gstrc rstrictiv px longitudinal gastrectomy (N/A, 10/08/2018); and Neuroma surgery (Left, 2025).    Assessment  Body Structures, Functions, Activity Limitations Requiring Skilled Therapeutic Intervention: Decreased functional mobility ;Decreased endurance;Decreased balance  Assessment: Pt is 44 y.o. s/p Left tranlabyrinthine craniotomy for resection of CP angle mass presenting with decreased functional mobility.  Pt is currently requiring physical assist for all functional mobility, which is a decline from reported baseline.  Anticipate good progress throughout IP stay.  Pt will benefit from skilled therapy to maximize safety and independence.  PT recommends 24 hour supervision/assist with OPPT pending progress for increased safety.  Treatment Diagnosis: decreased functional mobility s/p L craniotomy  Therapy Prognosis: Good  Decision Making: Medium Complexity  Requires PT

## 2025-01-10 NOTE — OP NOTE
OPERATIVE REPORT    Patient Name: Juan Antonio Patel  MRN: [id number]  : 1980  DOS: 2025    PRE-OPERATIVE DIAGNOSIS: Left vestibular schwannoma    POST-OPERATIVE DIAGNOSIS: Left facial schwannoma     OPERATIVE PROCEDURES PERFORMED:  1. Leftt tranlabyrinthine craniotomy for resection of CP angle mass  2. Microdissection using the operating microscope  3. Intraoperative neuromonitoring (MEP, SSEP, CN VII).    SURGEON: Rodolfo Wells M.D., Ph.D.    CO-SURGEON: Evaristo Alexander M.D.     ASSISTANT SURGEON: Sadiq Mccann M.D., Ph.D.    ANESTHESIA: General endotracheal    ESTIMATED BLOOD LOSS: 100 mL.     DRAINS: None.    IMPLANTS: None.    SPECIMEN:  1. Left IAC mass for permanent histology  2. Left facial schwannoma for permanent histology     DISPOSITION: PACU in stable condition.      INDICATIONS: Mr. Patel is a 44 year old man who presented with progressive unilateral hearing loss. He was found to have a left Koos grade I vestibular schwannoma and was followed conservatively. Surveillance imaging demonstrated progressive enlargement of the lesion. Given his symptoms, his young age, and the progression of the tumor, he elected to proceed with surgical resection of the mass. The patient understands the risks and benefits of the procedure including but not limited to bleeding, infection, hearing loss, facial weakness, cognitive dysfunction, worsened neurologic injury, inability to resect the tumor, vascular injury, cerebral spinal fluid leak, anesthesia risks, and death.     PROCEDURES IN DETAIL:     Under universal protocol and informed consent, the patient was brought to the operating room. General anesthesia was induced and the patient was intubated. The patient was positioned supine. All pressure points were appropriately padded. The patient's head was then turned to the patient's right. A left retroauricular incision was planned and shaved. An abdominal incision was also marked and shaved. A Carter

## 2025-01-10 NOTE — PROGRESS NOTES
NEUROSURGERY POST-OP PROGRESS NOTE    Patient Name: Juan Antonio Patel Jr. YOB: 1980   Sex: Male Age: 44 yrs     Medical Record Number: [id number] Acct Number: [id number]   Room Number: 4501/4501-01 Hospital Day: Hospital Day: 2     Interval History:  Post-operative Day#1 s/p Procedure(s) (LRB):  LEFT TRANSLABYRINTHINE APPROACH FOR RESECTION OF VESTIBULAR SCHWANNOMA/ ABDOMINAL FAT GRAFT (Left)  LEFT TRANSLABYRINTHINE APPROACH FOR RESECTION OF VESTIBULAR SCHWANNOMA, ABDOMINAL FAT GRAFT, FASCIA GRAFT (Left)    Subjective: Pt c/o of dizziness and room spinning when he opens his eyes. Patch has made his nausea some better.    Objective:    VITAL SIGNS   /62   Pulse 80   Temp 97.6 °F (36.4 °C) (Oral)   Resp 18   Ht 1.905 m (6' 3\")   Wt 116.2 kg (256 lb 2.8 oz)   SpO2 96%   BMI 32.02 kg/m²    Height Height: 190.5 cm (6' 3\")   Weight Weight - Scale: 116.2 kg (256 lb 2.8 oz)        Allergies Allergies   Allergen Reactions    Hazelnut Anaphylaxis    Other Other (See Comments)     Hazelnuts, throat closes      Diet ADULT DIET; Regular   Isolation No active isolations     LABS   Basic Metabolic Profile Recent Labs     01/10/25  0545      K 4.1      CO2 23   BUN 13   CREATININE 0.8*   GLUCOSE 136*   CALCIUM 8.5      Complete Blood Count Recent Labs     01/10/25  0545   WBC 12.5*   RBC 4.41   HGB 13.0*   HCT 38.8*         Coagulation Studies No results for input(s): \"PROTIME\", \"INR\", \"APTT\", \"ANTIXAUHEP\" in the last 72 hours.     MEDICATIONS   Inpatient Medications     buPROPion, 200 mg, Oral, BID    levothyroxine, 200 mcg, Oral, Daily    topiramate, 50 mg, Oral, BID    sodium chloride flush, 5-40 mL, IntraVENous, 2 times per day    polyethylene glycol, 17 g, Oral, Daily    sennosides-docusate sodium, 1 tablet, Oral, BID    preop    Cardiovascular:  Warm, well perfused    Assessment   Patient is a 45 yo M s/p Procedure(s) (LRB):  LEFT TRANSLABYRINTHINE APPROACH FOR RESECTION OF VESTIBULAR SCHWANNOMA/ ABDOMINAL FAT GRAFT (Left)  LEFT TRANSLABYRINTHINE APPROACH FOR RESECTION OF VESTIBULAR SCHWANNOMA, ABDOMINAL FAT GRAFT, FASCIA GRAFT (Left) per Dr. Wells . Pt educated from \"Your Guide to Neurosurgery\" book pg  25-27, 33-38.    Plan:  Neurologic exam frequency:q4  Mobility:PT/OT eval  Steroids: taper, SSI  Diet:ADAT  Antibiotics: post op  Carter:remove  DVT Prophylaxis: SCDs and heparin  GI Prophylaxis:protonix  Bowel Regimen: senna and glycolax  Pain control:zo   Scopolamine patch  Incisional Care:open to air and may shower  MRI today  Dispo Planning:keep in ICU    Patient was discussed with Dr. Mccann who agrees with above assessment and plan.     Electronically signed by: KUSUM Guardado - CNP, 1/10/2025 8:22 AM   Neurosurgery Nurse Practitioner  [phone]

## 2025-01-10 NOTE — CARE COORDINATION
Case Management Assessment  Initial Evaluation    Date/Time of Evaluation: 1/10/2025 12:16 PM  Assessment Completed by: Phil Beckwith RN    If patient is discharged prior to next notation, then this note serves as note for discharge by case management.    Patient Name: Juan Antonio Patel Jr.                   YOB: 1980  Diagnosis: Unilateral vestibular schwannoma (HCC) [D33.3]  Vestibular schwannoma (HCC) [D33.3]                   Date / Time: 1/9/2025  5:51 AM    Patient Admission Status: Inpatient   Readmission Risk (Low < 19, Mod (19-27), High > 27): Readmission Risk Score: 6.3    Current PCP: Bernie Mello APRN - CNP  PCP verified by CM? Yes    Chart Reviewed: Yes      History Provided by: Patient, Medical Record  Patient Orientation: Alert and Oriented    Patient Cognition: Alert    Hospitalization in the last 30 days (Readmission):  No    If yes, Readmission Assessment in CM Navigator will be completed and shown below.          Advance Directives:      Code Status: Full Code   Patient's Primary Decision Maker is: Legal Next of Kin      Discharge Planning:    Patient lives with: Spouse/Significant Other Type of Home: House  Primary Care Giver: Self  Patient Support Systems include: Spouse/Significant Other, Children   Current Financial resources:    Current community resources:    Current services prior to admission: None            Current DME:              Type of Home Care services:  None    ADLS  Prior functional level: Independent in ADLs/IADLs  Current functional level: Independent in ADLs/IADLs    PT AM-PAC:   /24  OT AM-PAC:   /24    Family can provide assistance at DC: Yes  Would you like Case Management to discuss the discharge plan with any other family members/significant others, and if so, who? Yes  Plans to Return to Present Housing: Yes  Other Identified Issues/Barriers to RETURNING to current housing: medical complications  Potential Assistance needed at discharge: N/A             Potential DME:    Patient expects to discharge to: House  Plan for transportation at discharge: Family    Financial    Payor: [employer] / Plan: [employer] - CHOICE PLUS / Product Type: *No Product type* /     Does insurance require precert for SNF: Yes    Potential assistance Purchasing Medications: No  Meds-to-Beds request:        [id number] - [place], [place] - [address] Kaycee TEIXEIRA [phone] - F [phone]  [address]   [facility] [address]  Phone: [phone] Fax: [phone]      Notes:    Factors facilitating achievement of predicted outcomes: Family support and Good insight into deficits    Barriers to discharge: Medical complications    Additional Case Management Notes: patient is from home with family. No current home services, IPTA. Patient admitted for Unilateral vestibular schwannoma (HCC) [D33.3]  Vestibular schwannoma (HCC) [D33.3]  POD1 LEFT TRANSLABYRINTHINE APPROACH FOR RESECTION OF VESTIBULAR SCHWANNOMA/ ABDOMINAL FAT GRAFT (Left)  LEFT TRANSLABYRINTHINE APPROACH FOR RESECTION OF VESTIBULAR SCHWANNOMA, ABDOMINAL FAT GRAFT, FASCIA GRAFT (Left)  MRI planned for today. SLP cleared pt for reg diet. Therapy to eval patient. CM following for dispo planning and support      The Plan for Transition of Care is related to the following treatment goals of Unilateral vestibular schwannoma (HCC) [D33.3]  Vestibular schwannoma (HCC) [D33.3]    IF APPLICABLE: The Patient and/or patient representative Juan Antonio and his family were provided with a choice of provider and agrees with the discharge plan. Freedom of choice list with basic dialogue that supports the patient's individualized plan of care/goals and shares the quality data associated with the providers was provided to: Patient   Patient Representative Name:       The Patient and/or Patient Representative Agree with the Discharge Plan? Yes    Phil Beckwith RN  Case Management Department  Ph: [phone] Fax:

## 2025-01-10 NOTE — PROGRESS NOTES
Nursing Core Measures for Brain Tumor patients:   [x]   Care Plan template documentation (Physiologic Instability - Neurosensory). Selecting this will add care plan rows to the flowsheet under the Neuro section of Head to Toe.  [x]   Verified Swallow Screen completed prior to PO intake of food, drink, medications>          Please verify correct medication route prior to administration for intubated patients, patients who can not swallow or have alternative routes of intake (NG, OG, AZ), etc  [x]   VTE Prophylaxis: SCDs ordered/addressed; SCDs: On   []   Verified patient has \"Your Guide to Neurosurgery\" booklet at bedside.yes     For Post-Operative patients:  [x]   Incision assessment:  Clean, dry, intact   [x]  Discussion on importance of Glucose control for those patients who are diabetic or on steroids to promote wound healing  [x]  Incision assessment and interventions:  Neuro to change dressing      Reviewed the Following Education with Patient and/or Family:   - Plan for hospital stay, included expected recovery while admitted - examples: Therapy consults, incision care, daily bathing, Neurosurgery rounding schedule.   - Importance of Follow Up / Post op Appointments at Discharge   - Importance of Compliance with Medications Prescribed at Discharge  - Available hospital and community resources, including Brain Tumor Support Group which meets the 3rd Monday of each month at [facility].     Patient and/or family member: verbalized understanding.         Electronically signed by Nikkie Rush RN on 1/10/2025 at 2:54 PM

## 2025-01-10 NOTE — PROGRESS NOTES
4 Eyes Skin Assessment     NAME:  Juan Antonio Patel Jr.  YOB: 1980  MEDICAL RECORD NUMBER:  [id number]    The patient is being assessed for  Admission    I agree that at least one RN has performed a thorough Head to Toe Skin Assessment on the patient. ALL assessment sites listed below have been assessed.      Areas assessed by both nurses:    Head, Face, Ears, Shoulders, Back, Chest, Arms, Elbows, Hands, Sacrum. Buttock, Coccyx, Ischium, Legs. Feet and Heels, and Under Medical Devices         Does the Patient have a Wound? No noted wound(s)       Tavo Prevention initiated by RN: No  Wound Care Orders initiated by RN: No    Pressure Injury (Stage 3,4, Unstageable, DTI, NWPT, and Complex wounds) if present, place Wound referral order by RN under : No    New Ostomies, if present place, Ostomy referral order under : No     Nurse 1 eSignature: Electronically signed by Jacquelyn Lux RN on 1/9/25 at 10:45 PM EST    **SHARE this note so that the co-signing nurse can place an eSignature**    Nurse 2 eSignature: Electronically signed by Odilia Pineda RN on 1/10/25 at 3:05 AM EST

## 2025-01-10 NOTE — DISCHARGE INSTRUCTIONS
[facility] Brain Tumor Survey  The [facility] [place] and [facility] value your feedback related to your recent hospital admission and surgery.  The anonymous survey below consists of a few questions that are related to your stay and around your surgery, treatment, and recovery while in the hospital. It is anonymous and has only a few questions.  The estimated length of time needed to complete this survey is 3 minutes or less. Thank you for completing this survey!           Your information:  Name: Juan Antonio Patel Jr.  : 1980    Your instructions:  Discharge Instructions:   -No lifting or strenuous activity   -Leave incision open to air. May shower. Do not soak incision, no tub baths.  -Do not drive or operate heavy machinery while on narcotics.   -Please call the office or return to ED for fever >101.5 or purulent drainage from wound.   -Eye care, keep eye shield on, this was given to you while in the hospital.   You were given an order to see Occupational therapy as out patient to have a driving assessment.         What to do after you leave the hospital:    Recommended diet: regular diet    Recommended activity: -No lifting or strenuous activity         The following personal items were collected during your admission and were returned to you:    Belongings  Dental Appliances: None  Vision - Corrective Lenses: Eyeglasses  Hearing Aid: None  Clothing: Footwear, Pants, Socks, Jacket/Coat, Undergarments  Jewelry: Ring  Body Piercings Removed: No  Electronic Devices: Cell Phone  Weapons (Notify Protective Services/Security): None  Home Medications: None  Valuables Given To: Patient  Provide Name(s) of Who Valuable(s) Were Given To: Isra  Responsible person(s) in the waiting room: Indira  Patient approves for provider to speak to responsible person post operatively: Yes    Information obtained by:  By signing below, I understand that if any problems occur once I leave

## 2025-01-10 NOTE — PLAN OF CARE
Problem: Discharge Planning  Goal: Discharge to home or other facility with appropriate resources  1/10/2025 1140 by Nikkie Rush RN  Outcome: Progressing     Problem: Pain  Goal: Verbalizes/displays adequate comfort level or baseline comfort level  1/10/2025 1140 by Nikkie Rush RN  Outcome: Progressing     Problem: Skin/Tissue Integrity  Goal: Absence of new skin breakdown  Description: 1.  Monitor for areas of redness and/or skin breakdown  2.  Assess vascular access sites hourly  3.  Every 4-6 hours minimum:  Change oxygen saturation probe site  4.  Every 4-6 hours:  If on nasal continuous positive airway pressure, respiratory therapy assess nares and determine need for appliance change or resting period.  1/10/2025 1140 by Nikkie Rush RN  Outcome: Progressing     Problem: Safety - Adult  Goal: Free from fall injury  1/10/2025 1140 by Nikkie Rush RN  Outcome: Progressing     Problem: Chronic Conditions and Co-morbidities  Goal: Patient's chronic conditions and co-morbidity symptoms are monitored and maintained or improved  1/10/2025 1140 by Nikkie Rush RN  Outcome: Progressing     Problem: ABCDS Injury Assessment  Goal: Absence of physical injury  1/10/2025 1140 by Nikkie Rush RN  Outcome: Progressing

## 2025-01-10 NOTE — PROGRESS NOTES
Speech Language Pathology  Facility/Department:[facility] ICU  Bedside Swallow & Speech Evaluation                                                       Name: Juan Antonio Patel Jr.  : 1980  MRN: [id number]    Patient Diagnosis(es):   Patient Active Problem List    Diagnosis Date Noted    Vestibular schwannoma (HCC) 2025    Acoustic neuroma (HCC) 2022    Lower extremity paralysis (HCC) 2020    Pain of right shoulder region 2019    Strain of right shoulder 2019    Severe obesity (BMI 35.0-35.9 with comorbidity) 10/22/2018    S/P laparoscopic sleeve gastrectomy 10/08/2018    Obstructive sleep apnea 2018    Mixed hyperlipidemia 2018    Vitamin D deficiency 2018    Precordial pain 2018    MILLER (dyspnea on exertion) 2018    Morbid obesity with BMI of 40.0-44.9, adult 2018    Snoring 2018    Chronic GERD 2018    Thyroid activity decreased 2015    Class 2 obesity due to excess calories without serious comorbidity in adult 2015    Irritable     Chronic lymphocytic thyroiditis 2013    Primary insomnia 2013       Past Medical History:   Diagnosis Date    Acoustic neuroma (HCC)     left    ADHD (attention deficit hyperactivity disorder)     Anxiety     Chronic back pain     Depression     Hearing loss     Hemiplegic migraine 2020    Hypothyroidism     Irritable     Nephrolith 2010    Schwannoma     Sleep apnea     used CPAP before weight loss surgery & has not used since     Thyroid disease     hypothyroid - chronic lymphocytic thyroiditis     Past Surgical History:   Procedure Laterality Date    HERNIA REPAIR  2016    ventral    NEUROMA SURGERY Left 2025    LEFT TRANSLABYRINTHINE APPROACH FOR RESECTION OF VESTIBULAR SCHWANNOMA/ ABDOMINAL FAT GRAFT performed by Rodolfo Wells MD at [facility] OR    JUNITO CONNOR [facility] RSTRICTIV PX LONGITUDINAL GASTRECTOMY N/A 10/08/2018    LAPAROSCOPIC SLEEVE  liquids  Encourage oral hygiene 2 x daily    Discharge Recommendation: TBD closer to discharge  Discussed with Nikkie LAROSE  Needs met prior to leaving room, call light within reach    Theresa Moralez, SLP, [id number]  Pg. # [phone]    This document will serve as a discharge summary if patient discharges prior to next visit.

## 2025-01-11 ENCOUNTER — APPOINTMENT (OUTPATIENT)
Dept: MRI IMAGING | Age: 45
DRG: 026 | End: 2025-01-11
Attending: NEUROLOGICAL SURGERY
Payer: COMMERCIAL

## 2025-01-11 LAB
GLUCOSE BLD-MCNC: 122 MG/DL (ref 70–99)
GLUCOSE BLD-MCNC: 132 MG/DL (ref 70–99)
GLUCOSE BLD-MCNC: 132 MG/DL (ref 70–99)
GLUCOSE BLD-MCNC: 157 MG/DL (ref 70–99)
PERFORMED ON: ABNORMAL

## 2025-01-11 PROCEDURE — 6370000000 HC RX 637 (ALT 250 FOR IP): Performed by: NEUROLOGICAL SURGERY

## 2025-01-11 PROCEDURE — 2060000000 HC ICU INTERMEDIATE R&B

## 2025-01-11 PROCEDURE — 2500000003 HC RX 250 WO HCPCS: Performed by: NEUROLOGICAL SURGERY

## 2025-01-11 PROCEDURE — 70553 MRI BRAIN STEM W/O & W/DYE: CPT

## 2025-01-11 PROCEDURE — A9579 GAD-BASE MR CONTRAST NOS,1ML: HCPCS | Performed by: NEUROLOGICAL SURGERY

## 2025-01-11 PROCEDURE — 6360000002 HC RX W HCPCS: Performed by: NEUROLOGICAL SURGERY

## 2025-01-11 PROCEDURE — 6370000000 HC RX 637 (ALT 250 FOR IP)

## 2025-01-11 PROCEDURE — 6360000004 HC RX CONTRAST MEDICATION: Performed by: NEUROLOGICAL SURGERY

## 2025-01-11 RX ADMIN — DEXAMETHASONE 4 MG: 4 TABLET ORAL at 05:33

## 2025-01-11 RX ADMIN — BUPROPION HYDROCHLORIDE 200 MG: 100 TABLET, FILM COATED, EXTENDED RELEASE ORAL at 21:48

## 2025-01-11 RX ADMIN — GADOTERIDOL 20 ML: 279.3 INJECTION, SOLUTION INTRAVENOUS at 10:22

## 2025-01-11 RX ADMIN — HEPARIN SODIUM 5000 UNITS: 5000 INJECTION INTRAVENOUS; SUBCUTANEOUS at 05:32

## 2025-01-11 RX ADMIN — OXYCODONE 5 MG: 5 TABLET ORAL at 03:24

## 2025-01-11 RX ADMIN — DEXAMETHASONE 4 MG: 4 TABLET ORAL at 23:51

## 2025-01-11 RX ADMIN — SODIUM CHLORIDE, PRESERVATIVE FREE 10 ML: 5 INJECTION INTRAVENOUS at 21:49

## 2025-01-11 RX ADMIN — TOPIRAMATE 50 MG: 25 TABLET, FILM COATED ORAL at 08:34

## 2025-01-11 RX ADMIN — WATER 2000 MG: 1 INJECTION INTRAMUSCULAR; INTRAVENOUS; SUBCUTANEOUS at 05:31

## 2025-01-11 RX ADMIN — DEXAMETHASONE 4 MG: 4 TABLET ORAL at 00:16

## 2025-01-11 RX ADMIN — DEXAMETHASONE 4 MG: 4 TABLET ORAL at 18:00

## 2025-01-11 RX ADMIN — BUPROPION HYDROCHLORIDE 200 MG: 100 TABLET, FILM COATED, EXTENDED RELEASE ORAL at 08:34

## 2025-01-11 RX ADMIN — HEPARIN SODIUM 5000 UNITS: 5000 INJECTION INTRAVENOUS; SUBCUTANEOUS at 13:41

## 2025-01-11 RX ADMIN — TOPIRAMATE 50 MG: 25 TABLET, FILM COATED ORAL at 21:48

## 2025-01-11 RX ADMIN — WATER 2000 MG: 1 INJECTION INTRAMUSCULAR; INTRAVENOUS; SUBCUTANEOUS at 12:44

## 2025-01-11 RX ADMIN — HEPARIN SODIUM 5000 UNITS: 5000 INJECTION INTRAVENOUS; SUBCUTANEOUS at 21:49

## 2025-01-11 RX ADMIN — SENNOSIDES AND DOCUSATE SODIUM 1 TABLET: 50; 8.6 TABLET ORAL at 21:48

## 2025-01-11 RX ADMIN — PANTOPRAZOLE SODIUM 40 MG: 40 TABLET, DELAYED RELEASE ORAL at 07:02

## 2025-01-11 RX ADMIN — SENNOSIDES AND DOCUSATE SODIUM 1 TABLET: 50; 8.6 TABLET ORAL at 08:34

## 2025-01-11 RX ADMIN — SODIUM CHLORIDE, PRESERVATIVE FREE 10 ML: 5 INJECTION INTRAVENOUS at 08:33

## 2025-01-11 RX ADMIN — OXYCODONE 5 MG: 5 TABLET ORAL at 22:40

## 2025-01-11 RX ADMIN — DEXTROAMPHETAMINE SACCHARATE, AMPHETAMINE ASPARTATE, DEXTROAMPHETAMINE SULFATE AND AMPHETAMINE SULFATE 30 MG: 7.5; 7.5; 7.5; 7.5 TABLET ORAL at 08:34

## 2025-01-11 RX ADMIN — LEVOTHYROXINE SODIUM 200 MCG: 0.2 TABLET ORAL at 07:02

## 2025-01-11 RX ADMIN — OXYCODONE 10 MG: 5 TABLET ORAL at 08:00

## 2025-01-11 RX ADMIN — DEXAMETHASONE 4 MG: 4 TABLET ORAL at 10:29

## 2025-01-11 ASSESSMENT — PAIN DESCRIPTION - DESCRIPTORS
DESCRIPTORS: ACHING

## 2025-01-11 ASSESSMENT — PAIN DESCRIPTION - ONSET
ONSET: ON-GOING
ONSET: ON-GOING

## 2025-01-11 ASSESSMENT — PAIN SCALES - GENERAL
PAINLEVEL_OUTOF10: 7
PAINLEVEL_OUTOF10: 4
PAINLEVEL_OUTOF10: 2
PAINLEVEL_OUTOF10: 0
PAINLEVEL_OUTOF10: 6

## 2025-01-11 ASSESSMENT — PAIN DESCRIPTION - LOCATION
LOCATION: HEAD

## 2025-01-11 ASSESSMENT — PAIN - FUNCTIONAL ASSESSMENT
PAIN_FUNCTIONAL_ASSESSMENT: ACTIVITIES ARE NOT PREVENTED

## 2025-01-11 ASSESSMENT — PAIN DESCRIPTION - ORIENTATION
ORIENTATION: POSTERIOR
ORIENTATION: LOWER;POSTERIOR
ORIENTATION: LEFT;POSTERIOR

## 2025-01-11 ASSESSMENT — PAIN DESCRIPTION - PAIN TYPE
TYPE: SURGICAL PAIN
TYPE: SURGICAL PAIN

## 2025-01-11 ASSESSMENT — PAIN DESCRIPTION - FREQUENCY
FREQUENCY: CONTINUOUS
FREQUENCY: CONTINUOUS

## 2025-01-11 NOTE — PROGRESS NOTES
Brief note  Patient seen and examined  No complaints    -Mental status: A&O x3; pleasant & appropriate  -Speech & Language: no aphasia; no dysarthria  -Cranial nerves: pupils symmetric; no notable dysconjugate gaze; eyes midline; LMN pattern facial weakness, surgery aware  -Motor: moving all extremities symmetrically and fully  -Other: no adventitious movements noted  Other Systems  -General Appearance: well-developed, well-nourished, no apparent distress  -Neck: supple  -Lungs: breathing unlabored, regular, no audible wheezes  -CV: pulses strong x4 extremities  -Abd: flat    Discussed need for eye patch w/ pt. He is to revisit this with nursing to avoid corneal abrasion.  Q4h neuro checks  MRI planned  No change to plan of care as documented by JENNIFER Vivas earlier today    Nedra Cheney NP  N[facility]

## 2025-01-11 NOTE — CONSULTS
THYROIDECTOMY, PARTIAL  2015    UPPER GASTROINTESTINAL ENDOSCOPY  2018    VASECTOMY N/A     WISDOM TOOTH EXTRACTION       FAMILY HISTORY & SOCIAL HISTORY:  Family history non-contributory  Family History   Problem Relation Age of Onset    Depression Mother     Psoriasis Mother     Arthritis Father         RA    Heart Disease Father     Rheum Arthritis Father     Cancer Sister         thyroid    Depression Sister     Arthritis Sister     Cancer Paternal Grandfather         lung    Cancer Paternal Aunt         lung    Depression Sister     Mental Illness Sister      Social History     Tobacco Use    Smoking status: Former     Current packs/day: 0.00     Average packs/day: 0.5 packs/day for 17.0 years (8.5 ttl pk-yrs)     Types: Cigarettes     Start date: 2001     Quit date: 2018     Years since quittin.0    Smokeless tobacco: Never   Vaping Use    Vaping status: Never Used   Substance Use Topics    Alcohol use: Yes     Comment: 1MTH    Drug use: No          Allergies & Outpatient Medications   ALLERGIES:  Allergies   Allergen Reactions    Hazelnut Anaphylaxis    Other Other (See Comments)     Hazelnuts, throat closes     HOME MEDICATIONS:  Current Outpatient Medications   Medication Instructions    amphetamine-dextroamphetamine (ADDERALL, 30MG,) 30 MG tablet 30 mg, Oral, 2 TIMES DAILY    buPROPion (WELLBUTRIN SR) 200 MG extended release tablet Take 1 tablet by mouth twice daily    busPIRone (BUSPAR) 10 MG tablet TAKE 1.5 TABLETS BY MOUTH 3 TIMES A DAY AS NEEDED for anxiety    levothyroxine (SYNTHROID) 25 MCG tablet One tab PO daily    levothyroxine (SYNTHROID) 200 mcg, Oral, DAILY    Multiple Vitamins-Minerals (THERAPEUTIC MULTIVITAMIN-MINERALS) tablet 1 tablet, Oral, DAILY    naltrexone (DEPADE) 25 mg, Oral, 2 times daily    topiramate (TOPAMAX) 50 MG tablet Take 1 tablet by mouth 2 times daily    UBRELVY 100 MG TABS 1 tablet, Oral, PRN    zolpidem (AMBIEN) 5 MG tablet TAKE 1 TABLET BY MOUTH 
2,000 mg IntraVENous q8h    insulin lispro  0-8 Units SubCUTAneous 4x Daily AC & HS    heparin (porcine)  5,000 Units SubCUTAneous 3 times per day    scopolamine  1 patch TransDERmal Q72H    pantoprazole  40 mg Oral QAM AC    amphetamine-dextroamphetamine  30 mg Oral BID      Infusions:    sodium chloride Stopped (01/10/25 1900)    sodium chloride      dextrose       PRN Meds: artificial tears, , PRN  busPIRone, 10 mg, TID PRN  sodium chloride flush, 5-40 mL, PRN  sodium chloride, , PRN  oxyCODONE, 5 mg, Q4H PRN   Or  oxyCODONE, 10 mg, Q4H PRN  HYDROmorphone, 0.5 mg, Q3H PRN   Or  HYDROmorphone, 1 mg, Q3H PRN  naloxone, 0.2 mg, PRN  promethazine, 12.5 mg, Q6H PRN   Or  ondansetron, 4 mg, Q6H PRN  aluminum & magnesium hydroxide-simethicone, 15 mL, Q6H PRN  bisacodyl, 10 mg, Daily PRN  labetalol, 20 mg, Q10 Min PRN  prochlorperazine, 10 mg, Q6H PRN  diazePAM, 5 mg, Q6H PRN  glucose, 4 tablet, PRN  dextrose bolus, 125 mL, PRN   Or  dextrose bolus, 250 mL, PRN  glucagon (rDNA), 1 mg, PRN  dextrose, , Continuous PRN        Labs and Imaging   CT Head wo Contrast    Result Date: 1/9/2025  STUDY DATE:  1/9/2025 18:34 EST EXAM: CT HEAD WO CONTRAST INDICATION: s/p craniotomy; 44-year-old male with vestibular schwannoma surgery COMPARISON: CT scan from December 17, 2024 TECHNICAL: Axial CT imaging of the head. Axial images and multiplanar reformatted images reviewed.  Individualized dose optimization technique was used in order to meet ALARA standards for radiation dose reduction.  In addition to vendor specific dose reduction algorithms, the dose reduction techniques vary based on the specific scanner utilized but frequently include automated exposure control, adjustment of the mA and/or kV according to patient size, and use of iterative reconstruction technique. CONTRAST: None. FINDINGS: There is evidence of left mastoid surgery along the left internal auditory canal with subcutaneous superficial gas and subdural gas along

## 2025-01-11 NOTE — PLAN OF CARE
Problem: Neurosensory - Adult  Goal: Achieves stable or improved neurological status  Outcome: Progressing  Goal: Achieves maximal functionality and self care  Outcome: Progressing     Problem: ABCDS Injury Assessment  Goal: Absence of physical injury  1/11/2025 0043 by Yamini Gaytan RN  Outcome: Progressing     Problem: Chronic Conditions and Co-morbidities  Goal: Patient's chronic conditions and co-morbidity symptoms are monitored and maintained or improved  1/11/2025 0043 by Yamini Gaytan RN  Outcome: Progressing     Problem: Safety - Adult  Goal: Free from fall injury  1/11/2025 0043 by Yamini Gaytan, RN  Outcome: Progressing     Problem: Skin/Tissue Integrity  Goal: Absence of new skin breakdown  Description: 1.  Monitor for areas of redness and/or skin breakdown  2.  Assess vascular access sites hourly  3.  Every 4-6 hours minimum:  Change oxygen saturation probe site  4.  Every 4-6 hours:  If on nasal continuous positive airway pressure, respiratory therapy assess nares and determine need for appliance change or resting period.  1/11/2025 0043 by Yamini Gaytan, RN  Outcome: Progressing     Problem: Pain  Goal: Verbalizes/displays adequate comfort level or baseline comfort level  1/11/2025 0043 by Yamini Gaytan, RN  Outcome: Progressing

## 2025-01-11 NOTE — PROGRESS NOTES
Patient taken to MRI via wheelchair.    Electronically signed by Nikkie Rush RN on 1/11/2025 at 9:15 AM

## 2025-01-11 NOTE — PROGRESS NOTES
Neurosurgery Progress Note    Patient seen and examined on 01/11/25. No acute events overnight.  Continues to complain of imbalance. Neurologically stable on exam with facial weakness with complete eye closure though truly HB 6.     A/P: 44-year-old man POD 2 s/p translab resection of facial schwannoma    -Neuro stable  -HOB elevated  -Frequent neuro checks  -May benefit from moisture chamber to eye  -Eye care  -MRI brain   -Decadron, PPI, SSI  -Pain control with PO meds  -PT/OT  -DVT ppx  -Dispo: OK to transfer to floor      Rodolfo Wells MD, PhD  [place] [address], [address]  [place], [place], [address] [phone] (c), [phone] (o)

## 2025-01-11 NOTE — PLAN OF CARE
Neurocritical Care Plan of Care:    Patient discussed with Dr. Wells, who okays the patient to downgrade today. Order placed for patient to downgrade to 5T. Has Q4 hour neuro checks ordered.     Hospitalist consulted, NCC will sign off. Please call if any questions or concerns arise.     KUSUM Lucia-CNP  Neurology & Neurocritical Care   Neurology Line: [phone]  [employer]: [facility] & Neuro Critical Care NPs

## 2025-01-11 NOTE — PROGRESS NOTES
4 Eyes Skin Assessment     NAME:  Juan Antonio Patel Jr.  YOB: 1980  MEDICAL RECORD NUMBER:  [id number]    The patient is being assessed for  Transfer to New Unit    I agree that at least one RN has performed a thorough Head to Toe Skin Assessment on the patient. ALL assessment sites listed below have been assessed.      Areas assessed by both nurses:    Head, Face, Ears, Shoulders, Back, Chest, Arms, Elbows, Hands, Sacrum. Buttock, Coccyx, Ischium, Legs. Feet and Heels, and Under Medical Devices         Does the Patient have a Wound? Yes wound(s) were present on assessment. LDA wound assessment was Initiated and completed by RN     -incision behind left ear     -incision to left lower abdomen   Tavo Prevention initiated by RN: Yes  Wound Care Orders initiated by RN: No    Pressure Injury (Stage 3,4, Unstageable, DTI, NWPT, and Complex wounds) if present, place Wound referral order by RN under : No    New Ostomies, if present place, Ostomy referral order under : No     Nurse 1 eSignature: Electronically signed by Phil Bliss RN on 1/11/25 at 1:31 PM EST    **SHARE this note so that the co-signing nurse can place an eSignature**    Nurse 2 eSignature: Electronically signed by Angeles Tejada RN on 1/11/25 at 4:00 PM EST

## 2025-01-11 NOTE — PROGRESS NOTES
All fall prevention measures remain in place. Call light is within reach. Pt is ambulating to the bathroom with stand by assist.

## 2025-01-11 NOTE — PROGRESS NOTES
Patient to be transported to  via transport. Report given to new nurse and all questions answered. Family in room and verbalizes understanding and all belongings to be sent with patient.     Electronically signed by Nikkie Rush RN on 1/11/2025 at 1:10 PM

## 2025-01-11 NOTE — PLAN OF CARE
Problem: Discharge Planning  Goal: Discharge to home or other facility with appropriate resources  1/11/2025 1502 by Phil Bliss, RN  Outcome: Progressing  Flowsheets (Taken 1/11/2025 1330)  Discharge to home or other facility with appropriate resources: Identify barriers to discharge with patient and caregiver   Pt will be assessed by PT/OT to help aid with DC planning  Problem: Pain  Goal: Verbalizes/displays adequate comfort level or baseline comfort level  1/11/2025 1502 by Phil Bliss, RN  Outcome: Progressing  Flowsheets (Taken 1/11/2025 1330)  Verbalizes/displays adequate comfort level or baseline comfort level: Encourage patient to monitor pain and request assistance   Pt will have adequate pain control this shift  Problem: Skin/Tissue Integrity  Goal: Absence of new skin breakdown  Description: 1.  Monitor for areas of redness and/or skin breakdown  2.  Assess vascular access sites hourly  3.  Every 4-6 hours minimum:  Change oxygen saturation probe site  4.  Every 4-6 hours:  If on nasal continuous positive airway pressure, respiratory therapy assess nares and determine need for appliance change or resting period.  1/11/2025 1502 by Phil Bliss, RN  Outcome: Progressing   Pt will ambulate and reposition independently to avoid any skin breakdown.

## 2025-01-11 NOTE — PROGRESS NOTES
PROGRESS NOTE        Hospital Day: 3  S/p left tranlabyrinthine resection to vestibular schwannoma 1/9/25    Subjective:  Feels some persistent dysequilibrium while standing; articulates the room looks \"tilted\" at times.  +facial weakness. Ambulating with PT with assistance. Tolerating oral intake     Physical Exam:    Vitals:    01/11/25 0830   BP:    Pulse:    Resp: 17   Temp:    SpO2:        Neuro Exam  The patient is well-appearing and is in no acute distress.   Alert and oriented ×4, appropriate, conversant with normal mood and affect.  HB6/6 on left  HB 1/6 on right  Dressing removed with mild ecchymosis at inferior aspect. No fluctuance, no erythema.  Abdominal incision with dermabond dressing in place, mild eccyhmosis peripherally.    Capillary refill is less than 2 s in all 4 extremities, pulses are strong and symmetric. Sensation is intact to light touch and temperature throughout.     MRI 1/11//25 with interval resection of tumor; small area of enhancement on post con T1 likely corresponding to air cell packing in vicinity of resection bed, visible fat graft, no CSFoma, no hematoma        Assessment:   S/p left tranlabyrinthine resection to vestibular schwannoma 1/9/25     Active Hospital Problems    Diagnosis Date Noted    S/P craniotomy [Z98.890] 01/10/2025    Vestibular schwannoma (HCC) [D33.3] 01/09/2025     Plan:  Lacrilube,  humidification chamber to left eye - humidification chamber unvailable at facility, but patching  Advance diet as tolerated  Continue pain control with oxycodone, tylenol, IV for breakthrough  Decadron taper  Ancef  DVT prophylaxis, SCDs and hepqrin  PT/OT for evaluation, ambulate with assist - continued work to ensure safe for ambulation           Bernadette Zavala MD

## 2025-01-11 NOTE — PROGRESS NOTES
Nursing Core Measures for Brain Tumor patients:   [x]   Care Plan template documentation (Physiologic Instability - Neurosensory). Selecting this will add care plan rows to the flowsheet under the Neuro section of Head to Toe.  [x]   Verified Swallow Screen completed prior to PO intake of food, drink, medications>          Please verify correct medication route prior to administration for intubated patients, patients who can not swallow or have alternative routes of intake (NG, OG, SC), etc  [x]   VTE Prophylaxis: SCDs ordered/addressed; SCDs: On   [x]   Verified patient has \"Your Guide to Neurosurgery\" booklet at bedside.yes     For Post-Operative patients:  [x]   Incision assessment:  Clean, dry, intact   [x]  Discussion on importance of Glucose control for those patients who are diabetic or on steroids to promote wound healing  [x]  Incision assessment and interventions:  Dressing being changed by neurosurgery       Reviewed the Following Education with Patient and/or Family:   - Plan for hospital stay, included expected recovery while admitted - examples: Therapy consults, incision care, daily bathing, Neurosurgery rounding schedule.   - Importance of Follow Up / Post op Appointments at Discharge   - Importance of Compliance with Medications Prescribed at Discharge  - Available hospital and community resources, including Brain Tumor Support Group which meets the 3rd Monday of each month at [facility].     Patient and/or family member: verbalized understanding.         Electronically signed by Yamini Gaytan RN on 1/11/2025 at 12:44 AM

## 2025-01-11 NOTE — PLAN OF CARE
Problem: Discharge Planning  Goal: Discharge to home or other facility with appropriate resources  1/11/2025 0854 by Nikkie Rush RN  Outcome: Progressing  Flowsheets (Taken 1/11/2025 0800)  Discharge to home or other facility with appropriate resources: Identify barriers to discharge with patient and caregiver     Problem: Pain  Goal: Verbalizes/displays adequate comfort level or baseline comfort level  1/11/2025 0854 by Nikkie Rush RN  Outcome: Progressing  Flowsheets (Taken 1/11/2025 0800)  Verbalizes/displays adequate comfort level or baseline comfort level:   Encourage patient to monitor pain and request assistance   Assess pain using appropriate pain scale   Administer analgesics based on type and severity of pain and evaluate response   Implement non-pharmacological measures as appropriate and evaluate response   Consider cultural and social influences on pain and pain management     Problem: Skin/Tissue Integrity  Goal: Absence of new skin breakdown  Description: 1.  Monitor for areas of redness and/or skin breakdown  2.  Assess vascular access sites hourly  3.  Every 4-6 hours minimum:  Change oxygen saturation probe site  4.  Every 4-6 hours:  If on nasal continuous positive airway pressure, respiratory therapy assess nares and determine need for appliance change or resting period.  1/11/2025 0854 by Nikkie Rush RN  Outcome: Progressing     Problem: Safety - Adult  Goal: Free from fall injury  1/11/2025 0854 by Nikkie Rush RN  Outcome: Progressing     Problem: Chronic Conditions and Co-morbidities  Goal: Patient's chronic conditions and co-morbidity symptoms are monitored and maintained or improved  1/11/2025 0854 by Nikkie Rush RN  Outcome: Progressing  Flowsheets (Taken 1/11/2025 0800)  Care Plan - Patient's Chronic Conditions and Co-Morbidity Symptoms are Monitored and Maintained or Improved: Monitor and assess patient's chronic conditions and comorbid symptoms for stability,

## 2025-01-12 VITALS
WEIGHT: 237 LBS | BODY MASS INDEX: 29.47 KG/M2 | TEMPERATURE: 97.6 F | OXYGEN SATURATION: 96 % | SYSTOLIC BLOOD PRESSURE: 144 MMHG | RESPIRATION RATE: 16 BRPM | DIASTOLIC BLOOD PRESSURE: 66 MMHG | HEIGHT: 75 IN | HEART RATE: 81 BPM

## 2025-01-12 LAB
GLUCOSE BLD-MCNC: 129 MG/DL (ref 70–99)
GLUCOSE BLD-MCNC: 133 MG/DL (ref 70–99)
PERFORMED ON: ABNORMAL
PERFORMED ON: ABNORMAL

## 2025-01-12 PROCEDURE — 2500000003 HC RX 250 WO HCPCS: Performed by: NEUROLOGICAL SURGERY

## 2025-01-12 PROCEDURE — 97530 THERAPEUTIC ACTIVITIES: CPT

## 2025-01-12 PROCEDURE — 6370000000 HC RX 637 (ALT 250 FOR IP): Performed by: NEUROLOGICAL SURGERY

## 2025-01-12 PROCEDURE — 6370000000 HC RX 637 (ALT 250 FOR IP): Performed by: OTOLARYNGOLOGY

## 2025-01-12 PROCEDURE — 6360000002 HC RX W HCPCS: Performed by: NEUROLOGICAL SURGERY

## 2025-01-12 PROCEDURE — 97535 SELF CARE MNGMENT TRAINING: CPT

## 2025-01-12 PROCEDURE — 6370000000 HC RX 637 (ALT 250 FOR IP)

## 2025-01-12 PROCEDURE — 97116 GAIT TRAINING THERAPY: CPT

## 2025-01-12 RX ORDER — MINERAL OIL, PETROLATUM 425; 568 MG/G; MG/G
OINTMENT OPHTHALMIC
Qty: 1 EACH | Refills: 2 | Status: SHIPPED | OUTPATIENT
Start: 2025-01-12

## 2025-01-12 RX ORDER — OMEGA-3 FATTY ACIDS/FISH OIL 300-1000MG
1 CAPSULE ORAL EVERY 4 HOURS PRN
Qty: 1 EACH | Refills: 2 | Status: SHIPPED | OUTPATIENT
Start: 2025-01-12

## 2025-01-12 RX ADMIN — DEXAMETHASONE 4 MG: 4 TABLET ORAL at 14:00

## 2025-01-12 RX ADMIN — BUPROPION HYDROCHLORIDE 200 MG: 100 TABLET, FILM COATED, EXTENDED RELEASE ORAL at 09:12

## 2025-01-12 RX ADMIN — LEVOTHYROXINE SODIUM 200 MCG: 0.2 TABLET ORAL at 05:34

## 2025-01-12 RX ADMIN — SENNOSIDES AND DOCUSATE SODIUM 1 TABLET: 50; 8.6 TABLET ORAL at 09:12

## 2025-01-12 RX ADMIN — SODIUM CHLORIDE, PRESERVATIVE FREE 10 ML: 5 INJECTION INTRAVENOUS at 09:12

## 2025-01-12 RX ADMIN — TOPIRAMATE 50 MG: 25 TABLET, FILM COATED ORAL at 09:11

## 2025-01-12 RX ADMIN — DEXAMETHASONE 4 MG: 4 TABLET ORAL at 05:34

## 2025-01-12 RX ADMIN — HEPARIN SODIUM 5000 UNITS: 5000 INJECTION INTRAVENOUS; SUBCUTANEOUS at 05:34

## 2025-01-12 RX ADMIN — OXYCODONE 10 MG: 5 TABLET ORAL at 14:00

## 2025-01-12 RX ADMIN — MINERAL OIL, PETROLATUM: 425; 573 OINTMENT OPHTHALMIC at 10:48

## 2025-01-12 RX ADMIN — DEXTROAMPHETAMINE SACCHARATE, AMPHETAMINE ASPARTATE, DEXTROAMPHETAMINE SULFATE AND AMPHETAMINE SULFATE 30 MG: 7.5; 7.5; 7.5; 7.5 TABLET ORAL at 09:12

## 2025-01-12 RX ADMIN — PANTOPRAZOLE SODIUM 40 MG: 40 TABLET, DELAYED RELEASE ORAL at 05:34

## 2025-01-12 RX ADMIN — PROMETHAZINE HYDROCHLORIDE 12.5 MG: 12.5 TABLET ORAL at 14:01

## 2025-01-12 RX ADMIN — OXYCODONE 5 MG: 5 TABLET ORAL at 05:33

## 2025-01-12 ASSESSMENT — PAIN DESCRIPTION - DESCRIPTORS
DESCRIPTORS: ACHING
DESCRIPTORS: THROBBING

## 2025-01-12 ASSESSMENT — PAIN DESCRIPTION - ORIENTATION
ORIENTATION: LEFT
ORIENTATION: LEFT

## 2025-01-12 ASSESSMENT — PAIN DESCRIPTION - ONSET: ONSET: ON-GOING

## 2025-01-12 ASSESSMENT — PAIN DESCRIPTION - LOCATION
LOCATION: NECK
LOCATION: HEAD

## 2025-01-12 ASSESSMENT — PAIN SCALES - GENERAL
PAINLEVEL_OUTOF10: 2
PAINLEVEL_OUTOF10: 4
PAINLEVEL_OUTOF10: 7

## 2025-01-12 ASSESSMENT — PAIN DESCRIPTION - PAIN TYPE: TYPE: SURGICAL PAIN

## 2025-01-12 ASSESSMENT — PAIN DESCRIPTION - FREQUENCY: FREQUENCY: CONTINUOUS

## 2025-01-12 ASSESSMENT — PAIN - FUNCTIONAL ASSESSMENT: PAIN_FUNCTIONAL_ASSESSMENT: ACTIVITIES ARE NOT PREVENTED

## 2025-01-12 NOTE — DISCHARGE SUMMARY
Neurosurgery Discharge Summary    Patient ID:   Juan Antonio Patel Jr.  [id number]  44 y.o.  1980    Admission Date: 1/9/2025    Discharge Date: 01/12/25    Reason for Admission:   Principal Diagnosis: Facial schwannoma   Secondary Diagnosis:   Patient Active Problem List   Diagnosis    Chronic lymphocytic thyroiditis    Primary insomnia    Irritable    Class 2 obesity due to excess calories without serious comorbidity in adult    Morbid obesity with BMI of 40.0-44.9, adult    Snoring    Chronic GERD    Precordial pain    MILLER (dyspnea on exertion)    Mixed hyperlipidemia    Vitamin D deficiency    Obstructive sleep apnea    S/P laparoscopic sleeve gastrectomy    Severe obesity (BMI 35.0-35.9 with comorbidity)    Pain of right shoulder region    Strain of right shoulder    Lower extremity paralysis (HCC)    Thyroid activity decreased    Acoustic neuroma (HCC)    Vestibular schwannoma (HCC)    S/P craniotomy       Procedures:   Left translabyrinthine approach for resection of facial schwannoma    Brief Summary of Patient's Course:   Patient admitted electively for abovementioned procedure. Patient tolerated the procedure well but tumor noted to be facial schwannoma rather than vestibular schwannoma intraoperatively. Post-operatively admitted to the ICU for close monitoring. Transferred to [facility] on POD 2 where diet advanced and pain well controlled. Mobilized with PT/OT on POD 2 with recommendations for 24 hrs supervision. Counseled regarding eye care for facial weakness. Discharged to home in stable condition on POD 3.     Discharge Instructions:   -No lifting or strenuous activity   -Leave incision open to air. May shower. Do not soak incision, no tub baths   -Do not drive or operate heavy machinery while on narcotics   -Please call the office or return to ED for fever >101.5 or purulent drainage from wound   -Eye care    Diet:   common adult     Follow Up Instructions:   To office in: in 2 weeks

## 2025-01-12 NOTE — PROGRESS NOTES
Spoke to Diego with PT, he will be up to see and work with the pt to see if he will need a walker at discharge. I will continue to follow, pt was updated on his plan of care. Pt has no other needs at this time. Lu Garcia RN

## 2025-01-12 NOTE — PROGRESS NOTES
Occupational Therapy  Occupational Therapy  Daily Treatment Note  Patient Name: Juan Antonio Patel Jr.  MRN: [id number]    Chart Reviewed: Yes       Other Position/Activity Restrictions: HOB elevated, up as tolerated     Additional Pertinent Hx: Pt is 44 y.o. admitted to  on 1/9/25 s/p Left tranlabyrinthine craniotomy for resection of CP angle mass  2. Microdissection using the operating microscope  3. Intraoperative neuromonitoring (MEP, SSEP, CN VII).  PMH: hypothyroidism, ADHD, anxiety, depression, chronic back pain.        Diagnosis: vestibular schwannoma       Subjective: Pt (left eye patch) supine in bed upon entry with HOB raised. Pt is motivated for therapy session and pleasant. Pt reports vision isn't perfect \"It's like a camera trying to focus going in and out.\" Pt reports no double vision.    Pain: Denies    Social/Functional History  Lives With: Spouse, Son, Daughter (and pets)  Type of Home: House  Home Layout: Two level, Bed/Bath upstairs, 1/2 bath on main level  Home Access: Stairs to enter without rails  Entrance Stairs - Number of Steps: 1 threshold, HR on steps to upstairs  Bathroom Shower/Tub: Walk-in shower  Bathroom Toilet: Standard  Bathroom Equipment: Shower chair  Home Equipment: Crutches  Has the patient had two or more falls in the past year or any fall with injury in the past year?: No  Prior Level of Assist for ADLs: Independent  Prior Level of Assist for Homemaking: Independent  Prior Level of Assist for Ambulation: Independent household ambulator, with or without device, Independent community ambulator, with or without device  Prior Level of Assist for Transfers: Independent  Active : Yes  Occupation: Full time employment  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

## 2025-01-12 NOTE — PROGRESS NOTES
Pt awake in bed, alert and oriented x4. VSS on RA, pt exhibiting no s/s acute distress at this time. Pt tolerating PO diet well. Pt ambulated this shift x1 GB SBA tolerating well. Pt voiding via BRP. Pt pain managed via MAR. Standard safety precautions in place, bed locked and in lowest position, bed/chair alarm on, gripper socks applied, bedside table/call light within reach, pt oriented to fall prevention measures. Pt states he Has no other needs at this time. Plan of care to continue.    Electronically signed by NILA OAKES RN on 1/12/2025 at 1:11 AM

## 2025-01-12 NOTE — PROGRESS NOTES
schwannoma; COMPARISON: Head CT 1/9/2025, MRI 9/3/2024 TECHNIQUE: Multiplanar, multisequence MR imaging of the head obtained prior to and following infusion of IV contrast. IV contrast: ProHance FINDINGS: Postoperative changes of left mastoidectomy are present. There is hyperintense T1 fat packing within the mastoid bowl. Very thin extra-axial collection in the posterior fossa reveals the left cerebellar convexity. The left internal auditory canal mass appears to be completely resected. There is postoperative enhancement in the region of the membranous labyrinth. Axial T2 CISS pulse sequence is limited by motion artifact and the anatomic distortion of postoperative changes. No evidence for thrombus involving the dural venous sinuses. The brain signal is normal. Major intracranial arteries are grossly patent.     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 No evidence for postoperative complication. Electronically signed by Melo Reyes MD    CT Head wo Contrast    Result Date: 1/9/2025  STUDY DATE:  1/9/2025 18:34 EST EXAM: CT HEAD WO CONTRAST INDICATION: s/p craniotomy; 44-year-old male with vestibular schwannoma surgery COMPARISON: CT scan from December 17, 2024 TECHNICAL: Axial CT imaging of the head. Axial images and multiplanar reformatted images reviewed.  Individualized dose optimization technique was used in order to meet ALARA standards for radiation dose reduction.  In addition to vendor specific dose reduction algorithms, the dose reduction techniques vary based on the specific scanner utilized but frequently include automated exposure control, adjustment of the mA and/or kV according to patient size, and use of iterative reconstruction technique. CONTRAST: None. FINDINGS: There is evidence of left mastoid surgery along the left internal auditory canal with subcutaneous superficial gas and subdural gas along the internal auditory canal region  following resection of mass. There is dural base gas noted along the inferior frontal region and throughout the left temporal dural and suprasellar region indicative of the recent surgery. Gray-white differentiation normal. No midline shift or hydrocephalus or dominant intracranial hemorrhage this time. No acute calvarial abnormalities.  No orbital abnormality. Paranasal sinuses and mastoid air cells clear.     Status post left craniotomy via mastoid approach with resection of the mastoid air cells and gas along the cerebellar pontine angle and prepontine cistern and anterior temporal and inferior anterior frontal region Electronically signed by Leo Iyer DO      CBC:   Recent Labs     01/10/25  0545   WBC 12.5*   HGB 13.0*        BMP:    Recent Labs     01/10/25  0545      K 4.1      CO2 23   BUN 13   CREATININE 0.8*   GLUCOSE 136*     Hepatic: No results for input(s): \"AST\", \"ALT\", \"BILITOT\", \"ALKPHOS\" in the last 72 hours.    Invalid input(s): \"ALB\"  Lipids:   Lab Results   Component Value Date/Time    CHOL 143 05/23/2024 08:55 AM    HDL 44 05/23/2024 08:55 AM    HDL 34 03/27/2012 12:50 PM    TRIG 71 05/23/2024 08:55 AM     Hemoglobin A1C:   Lab Results   Component Value Date/Time    LABA1C 4.9 04/06/2023 09:10 AM     TSH:   Lab Results   Component Value Date/Time    TSH 1.18 12/02/2024 07:36 AM     Troponin: No results found for: \"TROPONINT\"  Lactic Acid: No results for input(s): \"LACTA\" in the last 72 hours.  BNP: No results for input(s): \"PROBNP\" in the last 72 hours.  UA:  Lab Results   Component Value Date/Time    NITRU Negative 05/09/2020 11:32 PM    COLORU Yellow 05/09/2020 11:32 PM    PHUR 6.0 05/09/2020 11:32 PM    RBCUA TNTC  H 08/08/2010 08:40 PM    BACTERIA 1+ 08/08/2010 08:40 PM    CLARITYU Clear 05/09/2020 11:32 PM    SPECGRAV 1.025 08/05/2014 03:38 PM    LEUKOCYTESUR Negative 05/09/2020 11:32 PM    UROBILINOGEN 0.2 05/09/2020 11:32 PM    BILIRUBINUR Negative 05/09/2020

## 2025-01-12 NOTE — PROGRESS NOTES
Neurosurgery Progress Note    Patient seen and examined on 01/12/25. No acute events overnight. Neurologically stable on exam with facial weakness with incomplete eye closure (HB 6).     A/P: 44-year-old man POD 3 s/p translab resection of facial schwannoma    -Neuro stable  -HOB elevated  -Frequent neuro checks  -Moisture chamber to eye  -Eye care (artificial tears, lacrilube)  -MRI brain   -Decadron, PPI, SSI  -Pain control with PO meds  -PT/OT  -DVT ppx  -Dispo: Discharge to home today      Rodolfo Wells MD, PhD  [facility] [id number], [address]  [place], [place], [address] [phone] (c), [phone] (o)

## 2025-01-12 NOTE — PROGRESS NOTES
Pt left for discharge at this time, pt left in private transportation with his wife and two adult children. Pt left with his eye shield on and with some extras along with his rolling walker and all paper work and his belongings. Pt left in stable condition and had no other questions or needs prior to discharge. Lu Garcia RN

## 2025-01-12 NOTE — PROGRESS NOTES
Dr. Wells would like the pt to have an eye shield prior to discharge, had [facility] call down to central asking for one, I will continue to follow. Dr. Wells is ok with the out pt  screening, will need a scrip for one, he was made aware. Lu Garcia RN

## 2025-01-12 NOTE — PROGRESS NOTES
See MAR, ordered artificial tears given and during that same time the pt's new Eye shield was delivered. New eye shield was placed on the pt's left eye per Dr. Obrien request. The pt has no other needs at this time, call light is in reach. I will continue to follow throughout the shift. Lu Garcia RN

## 2025-01-12 NOTE — PROGRESS NOTES
PT finished working with the pt, pt will need a rolling walker. Order placed, CM will be by to deliver said walker. Once walker has been delivered the pt will be ready for discharge, I will give him his 2 pm Decadron dose prior to discharge to keep him on schedule, pt's discharge instructions for the next dosage time was then changed to reflect that dose given here. Lu Garcia RN

## 2025-01-12 NOTE — CARE COORDINATION
Case Management Assessment            Discharge Note                    Date / Time of Note: 1/12/2025 11:57 AM                  Discharge Note Completed by: KATIE Villarreal, DIETERW    Patient Name: Juan Antonio Patel Jr.   YOB: 1980  Diagnosis: Unilateral vestibular schwannoma (HCC) [D33.3]  Vestibular schwannoma (HCC) [D33.3]   Date / Time: 1/9/2025  5:51 AM    Current PCP: Bernie Mello, KUSUM - CNP  Clinic patient: No    Hospitalization in the last 30 days: No       Advance Directives:  Code Status: Full Code  [place] DNR form completed and on chart: No    Financial:  Payor: [employer] / Plan: [employer] - CHOICE PLUS / Product Type: *No Product type* /      Pharmacy:    [id number] - [place], [place] - [address] [place] TE[place]A [phone] - F [phone]  [address] [place] [address]   [facility] [address]  Phone: [phone] Fax: [phone]      Assistance purchasing medications?: Potential Assistance Purchasing Medications: No  Assistance provided by Case Management: None at this time    Does patient want to participate in local refill/ meds to beds program?:      Meds To Beds General Rules:  1. Can ONLY be done Monday- Friday between 8:30am-5pm  2. Prescription(s) must be in pharmacy by 3pm to be filled same day  3.Copy of patient's insurance/ prescription drug card and patient face sheet must be sent along with the prescription(s)  4. Cost of Rx cannot be added to hospital bill. If financial assistance is needed, please contact unit  or ;  or  CANNOT provide pharmacy voucher for patients co-pays  5. Patients can then  the prescription on their way out of the hospital at discharge, or pharmacy can deliver to the bedside if staff is available. (payment due at time of pick-up or delivery - cash, check, or card accepted)     Able to afford home medications/ co-pay costs: Yes    ADLS:  Current PT AM-PAC Score: 17

## 2025-01-12 NOTE — PROGRESS NOTES
PT is currently working with the pt, prior to discharge to evaluate him if he needs a walker at discharge, meanwhile extra eye patches were given to the pt's wife to take home with them. The pt states he has no other needs at this time, I will continue to follow. Lu Garcia RN

## 2025-01-12 NOTE — PLAN OF CARE
Problem: Discharge Planning  Goal: Discharge to home or other facility with appropriate resources  Outcome: Progressing     Problem: Pain  Goal: Verbalizes/displays adequate comfort level or baseline comfort level  Outcome: Progressing     Problem: Skin/Tissue Integrity  Goal: Absence of new skin breakdown  Description: 1.  Monitor for areas of redness and/or skin breakdown  2.  Assess vascular access sites hourly  3.  Every 4-6 hours minimum:  Change oxygen saturation probe site  4.  Every 4-6 hours:  If on nasal continuous positive airway pressure, respiratory therapy assess nares and determine need for appliance change or resting period.  Outcome: Progressing     Problem: Safety - Adult  Goal: Free from fall injury  Outcome: Progressing     Problem: Chronic Conditions and Co-morbidities  Goal: Patient's chronic conditions and co-morbidity symptoms are monitored and maintained or improved  Outcome: Progressing     Problem: ABCDS Injury Assessment  Goal: Absence of physical injury  Outcome: Progressing     Problem: Neurosensory - Adult  Goal: Achieves stable or improved neurological status  Outcome: Progressing  Goal: Achieves maximal functionality and self care  Outcome: Progressing

## 2025-01-12 NOTE — PLAN OF CARE
Problem: Discharge Planning  Goal: Discharge to home or other facility with appropriate resources  1/11/2025 1957 by Marty Hampton RN  Outcome: Progressing  1/11/2025 1502 by Phil Bliss RN  Outcome: Progressing  Flowsheets (Taken 1/11/2025 1330)  Discharge to home or other facility with appropriate resources: Identify barriers to discharge with patient and caregiver  1/11/2025 0854 by Nikkie Rush RN  Outcome: Progressing  Flowsheets (Taken 1/11/2025 0800)  Discharge to home or other facility with appropriate resources: Identify barriers to discharge with patient and caregiver     Problem: Pain  Goal: Verbalizes/displays adequate comfort level or baseline comfort level  1/11/2025 1957 by Marty Hampton RN  Outcome: Progressing  Note: Pt pain managed via MAR  1/11/2025 1502 by Phil Bliss RN  Outcome: Progressing  Flowsheets (Taken 1/11/2025 1330)  Verbalizes/displays adequate comfort level or baseline comfort level: Encourage patient to monitor pain and request assistance  1/11/2025 0854 by Nikkie Rush RN  Outcome: Progressing  Flowsheets (Taken 1/11/2025 0800)  Verbalizes/displays adequate comfort level or baseline comfort level:   Encourage patient to monitor pain and request assistance   Assess pain using appropriate pain scale   Administer analgesics based on type and severity of pain and evaluate response   Implement non-pharmacological measures as appropriate and evaluate response   Consider cultural and social influences on pain and pain management     Problem: Skin/Tissue Integrity  Goal: Absence of new skin breakdown  Description: 1.  Monitor for areas of redness and/or skin breakdown  2.  Assess vascular access sites hourly  3.  Every 4-6 hours minimum:  Change oxygen saturation probe site  4.  Every 4-6 hours:  If on nasal continuous positive airway pressure, respiratory therapy assess nares and determine need for appliance change or resting period.  1/11/2025 1957 by

## 2025-01-12 NOTE — PROGRESS NOTES
Patient tolerated treatment well    Plan  Physical Therapy Plan  General Plan: 5-7 times per week  Current Treatment Recommendations: Strengthening, ROM, Balance training, Functional mobility training, Transfer training, Endurance training, Gait training, Stair training, Neuromuscular re-education, Home exercise program, Safety education & training, Patient/Caregiver education & training, Equipment evaluation, education, & procurement, Therapeutic activities  Safety Devices  Type of Devices: All fall risk precautions in place, Call light within reach, Gait belt, Left in chair, Nurse notified    Restrictions  Restrictions/Precautions  Activity Level: Up as Tolerated  Position Activity Restriction  Other Position/Activity Restrictions: HOB elevated, up as tolerated     Subjective  General  Chart Reviewed: Yes  Patient assessed for rehabilitation services?: Yes  Additional Pertinent Hx: Pt is 44 y.o. admitted to  on 1/9/25 s/p Left tranlabyrinthine craniotomy for resection of CP angle mass  2. Microdissection using the operating microscope  3. Intraoperative neuromonitoring (MEP, SSEP, CN VII).  PMH: hypothyroidism, ADHD, anxiety, depression, chronic back pain.  Family/Caregiver Present: Yes (multiple family members)  Referring Practitioner: Rodolfo Wells MD  Diagnosis: unilateral vestibular schwannoma  Follows Commands: Within Functional Limits  General  General Comments: Pt found seated in recliner upon PT arrival.  Pt agreeable to therapy session.  Subjective  Subjective: \"I have my family room set up with a twin bed for me to sleep in.\"         Social/Functional History  Social/Functional History  Lives With: Spouse, Son, Daughter (and pets)  Type of Home: House  Home Layout: Two level, Bed/Bath upstairs, 1/2 bath on main level  Home Access: Stairs to enter without rails  Entrance Stairs - Number of Steps: 1 threshold, HR on steps to upstairs  Bathroom Shower/Tub: Walk-in shower  Bathroom Toilet:  and have set up their family room with a twin bed for pt to sleep in.  PT asking pt why he does not feel ready to trial a flight of stairs, pt stating that he does not feel like he is balanced enough to do so.  PT asking pt if he would like HHPT to come to his home to evaluate ability to perform stairs.  Pt denies need for HHPT and requests OPPT at d/c.  RN and CM notified.     Balance  Comments: SBA to maintain static standing balance with BUE support on RW          OutComes Score                                                  AM-PAC - Mobility    AM-PAC Basic Mobility - Inpatient   How much help is needed turning from your back to your side while in a flat bed without using bedrails?: A Little  How much help is needed moving from lying on your back to sitting on the side of a flat bed without using bedrails?: A Little  How much help is needed moving to and from a bed to a chair?: A Little  How much help is needed standing up from a chair using your arms?: A Little  How much help is needed walking in hospital room?: A Little  How much help is needed climbing 3-5 steps with a railing?: A Little  [facility] : 18  AM-PAC Inpatient T-Scale Score : 43.63  Mobility Inpatient CMS 0-100% Score: 46.58  Mobility Inpatient CMS G-Code Modifier : CK         Tinneti Score       Goals  Short Term Goals  Time Frame for Short Term Goals: Discharge  Short Term Goal 1: Pt will perform all bed mobility with supervision -ongoing  Short Term Goal 2: Pt will perform sit to/from stand with supervision -ongoing  Short Term Goal 3: Pt will ambulate 150' with LRAD and supervision -ongoing  Short Term Goal 4: Pt will ascend/descend 12 stairs with unilateral HR and CGA -ongoing  Short Term Goal 5: New Goal: Pt will ascend/descend 1 curb with RW and SBA  Patient Goals   Patient Goals : none stated       Education  Patient Education  Education Given To: Patient;Family  Education Provided Comments: HHPT vs

## 2025-01-12 NOTE — PROGRESS NOTES
Discharge instructions reviewed with the pt at this time, IVs removed and prescriptions have been sent to his pharmacy (pt is aware of this).  All questions answered.  Pt voiced understanding.  Copy of discharge papers given to the pt and a copy placed in the pt's paper chart. The pt then asked for extra eye shields the nursing supervisor will get him extra, he was also asking about a walker for home, called and asked CM, they are waiting on a note from PT. I will continue to follow. Lu Garcia RN

## 2025-01-12 NOTE — PROGRESS NOTES
Per OT they recommend that the pt have an order for an out patient Driving evaluation after discharge. I sent a perfect serve to Dr. Vivas asking if he could write a script for that if he goes home today. Lu Garcia RN

## 2025-01-12 NOTE — PLAN OF CARE
Problem: Discharge Planning  Goal: Discharge to home or other facility with appropriate resources  1/12/2025 1353 by Lu Garcia RN  Outcome: Completed  1/12/2025 1008 by Lu Garcia RN  Outcome: Progressing     Problem: Pain  Goal: Verbalizes/displays adequate comfort level or baseline comfort level  1/12/2025 1353 by Lu Garcia RN  Outcome: Completed  1/12/2025 1008 by Lu Garcia RN  Outcome: Progressing     Problem: Skin/Tissue Integrity  Goal: Absence of new skin breakdown  Description: 1.  Monitor for areas of redness and/or skin breakdown  2.  Assess vascular access sites hourly  3.  Every 4-6 hours minimum:  Change oxygen saturation probe site  4.  Every 4-6 hours:  If on nasal continuous positive airway pressure, respiratory therapy assess nares and determine need for appliance change or resting period.  1/12/2025 1353 by Lu Garcia RN  Outcome: Completed  1/12/2025 1008 by Lu Garcia RN  Outcome: Progressing     Problem: Safety - Adult  Goal: Free from fall injury  1/12/2025 1353 by Lu Garcia RN  Outcome: Completed  1/12/2025 1008 by Lu Garcia RN  Outcome: Progressing     Problem: Chronic Conditions and Co-morbidities  Goal: Patient's chronic conditions and co-morbidity symptoms are monitored and maintained or improved  1/12/2025 1353 by Lu Garcia RN  Outcome: Completed  1/12/2025 1008 by Lu Garcia RN  Outcome: Progressing     Problem: ABCDS Injury Assessment  Goal: Absence of physical injury  1/12/2025 1353 by Lu Garcia RN  Outcome: Completed  1/12/2025 1008 by Lu Garcia RN  Outcome: Progressing     Problem: Neurosensory - Adult  Goal: Achieves stable or improved neurological status  1/12/2025 1353 by Lu Garcia RN  Outcome: Completed  1/12/2025 1008 by Lu Garcia RN  Outcome: Progressing  Goal: Achieves maximal functionality and self care  1/12/2025 1353 by Lu Garcia RN  Outcome: Completed  1/12/2025 1008 by Lu Garcia  RN  Outcome: Progressing

## 2025-01-12 NOTE — PROGRESS NOTES
Bedside report completed, pt denies any needs at this time, OT is currently working with the pt now. I will continue to follow throughout the shift. Lu Garcia RN

## 2025-01-18 NOTE — OP NOTE
Operative Note      Patient: Juan Antonio Patel Jr.  YOB: 1980  MRN: [id number]    Date of Procedure: 1/9/2025    Pre-Op Diagnosis Codes:      * Unilateral vestibular schwannoma (HCC) [D33.3]    Post-Op Diagnosis: Same       Procedure:  Left Translabyrinthine approach for resection of schwannoma  Left abdominal fat graft  Left fascia graft    Surgeon: Rahul Alexander MD MPH    Co-Surgeon: Sadiq Mccann MD PHD  Rodolfo Wells [facility]    Assistant:   Bernadette Zavala MD    Anesthesia: General    Estimated Blood Loss (mL): less than 50     Complications: None    Specimens:   ID Type Source Tests Collected by Time Destination   A : Left Vestibular Nerve Tumor Tissue Tissue SURGICAL PATHOLOGY Rodolfo Wells MD 1/9/2025 1400    B : Left Facial Mass Tumor Tissue Tissue SURGICAL PATHOLOGY Rodolfo Wells MD 1/9/2025 1400        Implants:  Implant Name Type Inv. Item Serial No.  Lot No. LRB No. Used Action   GRAFT DURA [id number] ULTRAPURE CLLGN ADH BOYD MTRX DURAGN + - [id number]  GRAFT DURA [id number] ULTRAPURE CLLGN ADH BOYD MTRX DURAGN +  [id number] Left 1 Implanted         Drains:   [REMOVED] Urinary Catheter 01/09/25 Carter-Temperature (Removed)   $ Urethral catheter insertion Inserted for procedure 01/09/25 1845   Catheter Indications Perioperative use for selected surgical procedures 01/10/25 0800   Site Assessment No urethral drainage 01/10/25 0800   Urine Color Yellow 01/10/25 0800   Urine Appearance Clear 01/10/25 0800   Collection Container Standard 01/10/25 0800   Securement Method Securing device (Describe) 01/10/25 0800   Catheter Care  Soap and water 01/09/25 2030   Catheter Best Practices  Drainage tube clipped to bed;Catheter secured to thigh;Tamper seal intact;Bag below bladder;Bag not on floor;Lack of dependent loop in tubing;Drainage bag less than half full 01/10/25 0800   Status Draining;Patent 01/10/25 0800   Output (mL) 150 mL 01/10/25 0800

## 2025-01-22 DIAGNOSIS — F51.01 PRIMARY INSOMNIA: ICD-10-CM

## 2025-01-23 RX ORDER — ZOLPIDEM TARTRATE 5 MG/1
TABLET ORAL
Qty: 30 TABLET | Refills: 0 | Status: SHIPPED | OUTPATIENT
Start: 2025-01-23 | End: 2025-02-22

## 2025-01-23 NOTE — TELEPHONE ENCOUNTER
Last Office Visit  -  12/16/24  Next Office Visit  -  n/a    Last Filled  -  12/23/24  Last UDS -  10/19/23  Contract -  9/9/21

## 2025-02-06 NOTE — TELEPHONE ENCOUNTER
Last Office Visit  -  2/14/22  Next Office Visit  -  n/a    Last Filled  -  3/25/22  Last UDS -  9/7/21  Contract -  9/7/21 Please contact patient with unread portal results/message.

## 2025-02-11 RX ORDER — BUPROPION HYDROCHLORIDE 200 MG/1
TABLET, EXTENDED RELEASE ORAL
Qty: 180 TABLET | Refills: 3 | Status: SHIPPED | OUTPATIENT
Start: 2025-02-11

## 2025-02-11 NOTE — TELEPHONE ENCOUNTER
Last Office Visit  -  12/16/24  Next Office Visit  -  2/24/25    Last Filled  -  2/12/24  Last UDS -    Contract -

## 2025-02-23 DIAGNOSIS — F51.01 PRIMARY INSOMNIA: ICD-10-CM

## 2025-02-23 RX ORDER — ZOLPIDEM TARTRATE 5 MG/1
TABLET ORAL
Qty: 30 TABLET | Refills: 0 | Status: SHIPPED | OUTPATIENT
Start: 2025-02-23 | End: 2025-03-25

## 2025-02-24 ENCOUNTER — OFFICE VISIT (OUTPATIENT)
Dept: FAMILY MEDICINE CLINIC | Age: 45
End: 2025-02-24
Payer: COMMERCIAL

## 2025-02-24 VITALS
OXYGEN SATURATION: 98 % | DIASTOLIC BLOOD PRESSURE: 84 MMHG | WEIGHT: 240 LBS | BODY MASS INDEX: 30 KG/M2 | HEART RATE: 91 BPM | SYSTOLIC BLOOD PRESSURE: 116 MMHG

## 2025-02-24 DIAGNOSIS — H02.234 PARALYTIC LAGOPHTHALMOS OF LEFT UPPER EYELID: Primary | ICD-10-CM

## 2025-02-24 DIAGNOSIS — F41.9 ANXIETY: ICD-10-CM

## 2025-02-24 DIAGNOSIS — F90.9 ATTENTION DEFICIT HYPERACTIVITY DISORDER (ADHD), UNSPECIFIED ADHD TYPE: ICD-10-CM

## 2025-02-24 DIAGNOSIS — F51.01 PRIMARY INSOMNIA: ICD-10-CM

## 2025-02-24 PROCEDURE — 1036F TOBACCO NON-USER: CPT | Performed by: NURSE PRACTITIONER

## 2025-02-24 PROCEDURE — 99214 OFFICE O/P EST MOD 30 MIN: CPT | Performed by: NURSE PRACTITIONER

## 2025-02-24 PROCEDURE — G8427 DOCREV CUR MEDS BY ELIG CLIN: HCPCS | Performed by: NURSE PRACTITIONER

## 2025-02-24 PROCEDURE — G8417 CALC BMI ABV UP PARAM F/U: HCPCS | Performed by: NURSE PRACTITIONER

## 2025-02-24 RX ORDER — DEXTROAMPHETAMINE SACCHARATE, AMPHETAMINE ASPARTATE, DEXTROAMPHETAMINE SULFATE AND AMPHETAMINE SULFATE 7.5; 7.5; 7.5; 7.5 MG/1; MG/1; MG/1; MG/1
30 TABLET ORAL 2 TIMES DAILY
Qty: 60 TABLET | Refills: 0 | Status: SHIPPED | OUTPATIENT
Start: 2025-02-24 | End: 2025-03-26

## 2025-02-24 RX ORDER — MULTIVITAMIN
1 TABLET ORAL DAILY
COMMUNITY

## 2025-02-24 RX ORDER — LEVOTHYROXINE SODIUM 25 UG/1
TABLET ORAL
COMMUNITY
Start: 2025-02-02

## 2025-02-24 RX ORDER — ONDANSETRON 4 MG/1
TABLET, FILM COATED ORAL
COMMUNITY
Start: 2025-01-24

## 2025-02-24 ASSESSMENT — PATIENT HEALTH QUESTIONNAIRE - PHQ9
2. FEELING DOWN, DEPRESSED OR HOPELESS: SEVERAL DAYS
SUM OF ALL RESPONSES TO PHQ QUESTIONS 1-9: 2
SUM OF ALL RESPONSES TO PHQ9 QUESTIONS 1 & 2: 2
SUM OF ALL RESPONSES TO PHQ QUESTIONS 1-9: 2
1. LITTLE INTEREST OR PLEASURE IN DOING THINGS: SEVERAL DAYS

## 2025-02-24 NOTE — PROGRESS NOTES
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 male with significant past medical history of vestibular schwannoma resection left side which caused left sided facial paralysis. I am seeing this patient for preop consultation for Dr. 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    Hypothyroidism     Irritable     Nephrolith 05/2010    Schwannoma     Sleep apnea     used CPAP before weight loss surgery & has not used since 2019    Thyroid disease     hypothyroid - chronic lymphocytic thyroiditis     Past Surgical History:   Procedure Laterality Date    HERNIA REPAIR  04/05/2016    ventral    NEUROMA SURGERY Left 1/9/2025    LEFT TRANSLABYRINTHINE APPROACH FOR RESECTION OF VESTIBULAR SCHWANNOMA/ ABDOMINAL FAT GRAFT performed by Rodolfo Wells MD at [facility] OR    JUNITO CONNOR [facility] RSTRICTIV PX LONGITUDINAL GASTRECTOMY N/A 10/08/2018    LAPAROSCOPIC SLEEVE GASTRECTOMY-ETHICON performed by Atilio Medrano MD at [facility] OR    THYROIDECTOMY, PARTIAL  03/2015    UPPER GASTROINTESTINAL ENDOSCOPY  05/11/2018    VASECTOMY N/A 2012    WISDOM TOOTH EXTRACTION       Current Outpatient Medications   Medication Sig Dispense Refill    levothyroxine (SYNTHROID) 25 MCG tablet       Multiple Vitamin (MULTI-VITAMIN) TABS Take 1 tablet by mouth daily      ondansetron (ZOFRAN) 4 MG tablet TAKE 1 TABLET BY MOUTH EVERY 6 HOURS FOR NAUSEA      zolpidem (AMBIEN) 5 MG tablet TAKE 1 TABLET BY MOUTH AT NIGHT AS NEEDED

## 2025-02-24 NOTE — PROGRESS NOTES
Patient: Juan Antonio Patel Jr. is a 44 y.o. male who presents today with the following Chief Complaint(s):  Chief Complaint   Patient presents with    Pre-op Exam     LEFT TEMPORAL BROW LIFT, Dr. John Ramirez, [facility], 2/27/25         HPI  ADHD: adderall 30mg BID- takes the second dose early afternoon- works well for concentration- no trouble with eating due to med  Insomnia: ambien 5mg nightly PRN-works well for sleep   Anxiety: mood stable on Wellbutrin 200 mg SR BID     Current Outpatient Medications   Medication Sig Dispense Refill    levothyroxine (SYNTHROID) 25 MCG tablet       Multiple Vitamin (MULTI-VITAMIN) TABS Take 1 tablet by mouth daily      ondansetron (ZOFRAN) 4 MG tablet TAKE 1 TABLET BY MOUTH EVERY 6 HOURS FOR NAUSEA      amphetamine-dextroamphetamine (ADDERALL, 30MG,) 30 MG tablet Take 1 tablet by mouth 2 times daily for 30 days. 60 tablet 0    zolpidem (AMBIEN) 5 MG tablet TAKE 1 TABLET BY MOUTH AT NIGHT AS NEEDED FOR SLEEP. MAX 1 TAB IN 24 HOURS 30 tablet 0    buPROPion (WELLBUTRIN SR) 200 MG extended release tablet Take 1 tablet by mouth twice daily 180 tablet 3    White Petrolatum-Mineral Oil (REFRESH LACRI-LUBE) OINT ointment Apply to left eye at night 1 each 2    dextran 70-hypromellose, PF, (ARTIFICIAL TEARS PF) 0.1-0.3 % SOLN opthalmic solution Place 1 drop into the left eye every 4 hours as needed (dry eye) 1 each 2    scopolamine (TRANSDERM-SCOP) transdermal patch Place 1 patch onto the skin every 72 hours 3 patch 0    sennosides-docusate sodium (SENOKOT-S) 8.6-50 MG tablet Take 1 tablet by mouth 2 times daily      pantoprazole (PROTONIX) 40 MG tablet Take 1 tablet by mouth every morning (before breakfast) 30 tablet 0    ondansetron (ZOFRAN-ODT) 4 MG disintegrating tablet Take 1 tablet by mouth 3 times daily as needed for Nausea or Vomiting 21 tablet 0    busPIRone (BUSPAR) 10 MG tablet TAKE 1.5 TABLETS BY MOUTH 3 TIMES A DAY AS NEEDED for anxiety (Patient taking differently: Take 1 tablet

## 2025-02-25 LAB
Lab: NORMAL
REPORT: NORMAL
THIS TEST SENT TO: NORMAL

## 2025-03-27 DIAGNOSIS — F51.01 PRIMARY INSOMNIA: ICD-10-CM

## 2025-03-27 RX ORDER — ZOLPIDEM TARTRATE 5 MG/1
TABLET ORAL
Qty: 30 TABLET | Refills: 0 | Status: SHIPPED | OUTPATIENT
Start: 2025-03-27 | End: 2025-04-26

## 2025-04-07 DIAGNOSIS — Z12.11 COLON CANCER SCREENING: Primary | ICD-10-CM

## 2025-04-25 DIAGNOSIS — F51.01 PRIMARY INSOMNIA: ICD-10-CM

## 2025-04-25 RX ORDER — ZOLPIDEM TARTRATE 5 MG/1
TABLET ORAL
Qty: 30 TABLET | Refills: 0 | Status: SHIPPED | OUTPATIENT
Start: 2025-04-25 | End: 2025-05-25

## 2025-04-25 NOTE — TELEPHONE ENCOUNTER
Last Office Visit  -  02/24/2025  Next Office Visit  -  n/a    Last Filled  -  03/27/2025  Last UDS -    Contract -

## 2025-05-20 ENCOUNTER — OFFICE VISIT (OUTPATIENT)
Dept: FAMILY MEDICINE CLINIC | Age: 45
End: 2025-05-20
Payer: COMMERCIAL

## 2025-05-20 VITALS
OXYGEN SATURATION: 98 % | HEART RATE: 94 BPM | BODY MASS INDEX: 28.05 KG/M2 | WEIGHT: 225.6 LBS | SYSTOLIC BLOOD PRESSURE: 110 MMHG | HEIGHT: 75 IN | DIASTOLIC BLOOD PRESSURE: 78 MMHG

## 2025-05-20 DIAGNOSIS — Z01.818 PREOP EXAMINATION: ICD-10-CM

## 2025-05-20 DIAGNOSIS — G51.0 FACIAL NERVE PARALYSIS: Primary | ICD-10-CM

## 2025-05-20 PROCEDURE — 99213 OFFICE O/P EST LOW 20 MIN: CPT | Performed by: NURSE PRACTITIONER

## 2025-05-20 PROCEDURE — G8427 DOCREV CUR MEDS BY ELIG CLIN: HCPCS | Performed by: NURSE PRACTITIONER

## 2025-05-20 PROCEDURE — 1036F TOBACCO NON-USER: CPT | Performed by: NURSE PRACTITIONER

## 2025-05-20 PROCEDURE — G8417 CALC BMI ABV UP PARAM F/U: HCPCS | Performed by: NURSE PRACTITIONER

## 2025-05-20 RX ORDER — TIRZEPATIDE 2.5 MG/.5ML
2.5 INJECTION, SOLUTION SUBCUTANEOUS WEEKLY
COMMUNITY

## 2025-05-20 NOTE — PROGRESS NOTES
[facility]  [phone]  Fax: [phone]   Pre-operative History and Physical      DIAGNOSIS:  facial paralysis, history of removal of vestibular schwannoma    PROCEDURE: L5-7, 12-7  Nerve transfer,left  facial nerve decompression, Lt cochlear implant, fascia graft, possible abdominal graft, possible secondary repair of dura/CFS leak       History Obtained From:  patient    HISTORY OF PRESENT ILLNESS:    The patient is a 44 y.o. male with significant past medical history of facial paralysis . I am seeing this patient for preop consultation for Dr. John Ramirez and Dr. 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    Hypothyroidism     Irritable     Nephrolith 05/2010    Schwannoma     Sleep apnea     used CPAP before weight loss surgery & has not used since 2019    Thyroid disease     hypothyroid - chronic lymphocytic thyroiditis     Past Surgical History:   Procedure Laterality Date    HERNIA REPAIR  04/05/2016    ventral    NEUROMA SURGERY Left 1/9/2025    LEFT TRANSLABYRINTHINE APPROACH FOR RESECTION OF VESTIBULAR SCHWANNOMA/ ABDOMINAL FAT GRAFT performed by Rodolfo Wells MD at [facility] OR    JUNITO CONNOR [facility] RSTRICTIV PX LONGITUDINAL GASTRECTOMY N/A 10/08/2018    LAPAROSCOPIC SLEEVE GASTRECTOMY-ETHICON performed by Atilio Medrano MD at [facility] OR    THYROIDECTOMY, PARTIAL  03/2015    UPPER GASTROINTESTINAL ENDOSCOPY  05/11/2018    VASECTOMY N/A 2012    WISDOM TOOTH EXTRACTION       Current Outpatient Medications   Medication Sig Dispense Refill    tirzepatide-weight management (ZEPBOUND) 2.5 MG/0.5ML SOLN subCUTAneous injection (VIAL) Inject 0.5 mLs into the skin once a week      zolpidem (AMBIEN) 5 MG tablet TAKE 1 TABLET BY MOUTH AT NIGHT AS NEEDED FOR SLEEP. MAX 1 TAB IN 24 HOURS 30 tablet 0    levothyroxine

## 2025-05-31 DIAGNOSIS — F51.01 PRIMARY INSOMNIA: ICD-10-CM

## 2025-06-02 RX ORDER — ZOLPIDEM TARTRATE 5 MG/1
TABLET ORAL
Qty: 30 TABLET | Refills: 0 | Status: SHIPPED | OUTPATIENT
Start: 2025-06-02 | End: 2025-07-02

## 2025-06-05 DIAGNOSIS — E03.9 ACQUIRED HYPOTHYROIDISM: ICD-10-CM

## 2025-06-05 LAB — TSH SERPL DL<=0.005 MIU/L-ACNC: 1.4 UIU/ML (ref 0.27–4.2)

## 2025-06-09 ENCOUNTER — OFFICE VISIT (OUTPATIENT)
Dept: ENDOCRINOLOGY | Age: 45
End: 2025-06-09
Payer: COMMERCIAL

## 2025-06-09 VITALS
HEART RATE: 83 BPM | SYSTOLIC BLOOD PRESSURE: 132 MMHG | DIASTOLIC BLOOD PRESSURE: 83 MMHG | WEIGHT: 225 LBS | OXYGEN SATURATION: 96 % | BODY MASS INDEX: 28.12 KG/M2

## 2025-06-09 DIAGNOSIS — E03.9 ACQUIRED HYPOTHYROIDISM: ICD-10-CM

## 2025-06-09 PROCEDURE — 1036F TOBACCO NON-USER: CPT | Performed by: INTERNAL MEDICINE

## 2025-06-09 PROCEDURE — 99213 OFFICE O/P EST LOW 20 MIN: CPT | Performed by: INTERNAL MEDICINE

## 2025-06-09 PROCEDURE — G8427 DOCREV CUR MEDS BY ELIG CLIN: HCPCS | Performed by: INTERNAL MEDICINE

## 2025-06-09 PROCEDURE — G8417 CALC BMI ABV UP PARAM F/U: HCPCS | Performed by: INTERNAL MEDICINE

## 2025-06-09 RX ORDER — LEVOTHYROXINE SODIUM 25 UG/1
TABLET ORAL
Qty: 30 TABLET | Refills: 5 | Status: SHIPPED | OUTPATIENT
Start: 2025-06-09

## 2025-06-09 RX ORDER — LEVOTHYROXINE SODIUM 200 UG/1
200 TABLET ORAL DAILY
Qty: 30 TABLET | Refills: 5 | Status: SHIPPED | OUTPATIENT
Start: 2025-06-09

## 2025-06-09 NOTE — PROGRESS NOTES
07:41 AM    TSH 1.18 12/02/2024 07:36 AM     No results found for: \"FREET4\"      Assessment:     1.Hypothyroidism: For years, not controlled. TSH has been fluctuating. Discussed possible causes. -ve  HAMA. Normal T3 level. May have some THR as TSH/T4 have been discordant sometimes.   Repeat level showed low TSH, high FT4, decreased  dose. Now TSH normal. Free level normal  Keep same dose    Plan:     1.Levothyroxine  225mcg  2.TFT in 12 months

## 2025-06-17 DIAGNOSIS — F90.9 ATTENTION DEFICIT HYPERACTIVITY DISORDER (ADHD), UNSPECIFIED ADHD TYPE: ICD-10-CM

## 2025-06-18 RX ORDER — DEXTROAMPHETAMINE SACCHARATE, AMPHETAMINE ASPARTATE, DEXTROAMPHETAMINE SULFATE AND AMPHETAMINE SULFATE 7.5; 7.5; 7.5; 7.5 MG/1; MG/1; MG/1; MG/1
30 TABLET ORAL 2 TIMES DAILY
Qty: 60 TABLET | Refills: 0 | Status: SHIPPED | OUTPATIENT
Start: 2025-06-18 | End: 2025-07-18

## 2025-06-18 NOTE — TELEPHONE ENCOUNTER
Last Office Visit  -  5/20/25  Next Office Visit  -  n/a    Last Filled  -  2/24/25  Last UDS -  n/a  Contract -  9/7/21

## 2025-06-19 ENCOUNTER — TRANSCRIBE ORDERS (OUTPATIENT)
Dept: ADMINISTRATIVE | Age: 45
End: 2025-06-19

## 2025-06-19 DIAGNOSIS — D33.3 ACOUSTIC NEUROMA (HCC): Primary | ICD-10-CM

## 2025-06-28 DIAGNOSIS — F51.01 PRIMARY INSOMNIA: ICD-10-CM

## 2025-06-28 RX ORDER — ZOLPIDEM TARTRATE 5 MG/1
TABLET ORAL
Qty: 30 TABLET | Refills: 0 | Status: SHIPPED | OUTPATIENT
Start: 2025-07-01 | End: 2025-07-31

## 2025-07-29 DIAGNOSIS — F90.9 ATTENTION DEFICIT HYPERACTIVITY DISORDER (ADHD), UNSPECIFIED ADHD TYPE: ICD-10-CM

## 2025-07-30 RX ORDER — DEXTROAMPHETAMINE SACCHARATE, AMPHETAMINE ASPARTATE, DEXTROAMPHETAMINE SULFATE AND AMPHETAMINE SULFATE 7.5; 7.5; 7.5; 7.5 MG/1; MG/1; MG/1; MG/1
TABLET ORAL
Qty: 60 TABLET | Refills: 0 | Status: SHIPPED | OUTPATIENT
Start: 2025-07-30 | End: 2025-08-29

## 2025-07-30 NOTE — TELEPHONE ENCOUNTER
Last Office Visit  -  05/20/2025  Next Office Visit  -  n/a    Last Filled  -  06/18/2025  Last UDS -    Contract -

## 2025-08-12 DIAGNOSIS — F51.01 PRIMARY INSOMNIA: ICD-10-CM

## 2025-08-13 RX ORDER — ZOLPIDEM TARTRATE 5 MG/1
TABLET ORAL
Qty: 30 TABLET | Refills: 0 | Status: SHIPPED | OUTPATIENT
Start: 2025-08-13 | End: 2025-09-12

## (undated) DEVICE — ADAPTER LAB INTMED LUER 17GA

## (undated) DEVICE — COVER XR CASS W21XL40IN UNIV ADH MICROSHIELD

## (undated) DEVICE — SHEET,DRAPE,53X77,STERILE: Brand: MEDLINE

## (undated) DEVICE — SOLUTION IV IRRIG WATER 1000ML POUR BRL 2F7114

## (undated) DEVICE — 7CM ELITE IRRIGATION SLEEVE

## (undated) DEVICE — SUTURE MONOCRYL + SZ 4-0 L27IN ABSRB UD L19MM PS-2 3/8 CIR MCP426H

## (undated) DEVICE — PASSIVE LAPSCP FLTR W/ 1/4INX24 TBNG AND M LUER LCK FIT - U

## (undated) DEVICE — KIT OR ROOM TURNOVER W/STRAP

## (undated) DEVICE — 3M™ WARMING BLANKET, UPPER BODY, 10 PER CASE, 42268: Brand: BAIR HUGGER™

## (undated) DEVICE — APPLICATOR PREP 26ML 0.7% IOD POVACRYLEX 74% ISO ALC ST

## (undated) DEVICE — TROCAR ENDOSCP L100MM DIA15MM BLDELSS STBL SL ENDOPATH XCEL

## (undated) DEVICE — UNDERGLOVE SURG SZ 8 BLU LTX FREE SYN POLYISOPRENE POLYMER

## (undated) DEVICE — 4.0MM COARSE DIAMOND

## (undated) DEVICE — LAPAROSCOPIC SCISSORS: Brand: EPIX LAPAROSCOPIC SCISSORS

## (undated) DEVICE — AGENT HEMSTAT W1XL2IN ABSRB SFT LTWT LAYERED ORC FIBRILLAR

## (undated) DEVICE — WET SKIN PREP TRAY: Brand: MEDLINE INDUSTRIES, INC.

## (undated) DEVICE — RELOAD STPL H4.1X2MM DIA60MM THCK TISS GRN 6 ROW PWR GST B

## (undated) DEVICE — WIPE INSTR W73XL73CM VISITEC

## (undated) DEVICE — 5.0MM COARSE DIAMOND

## (undated) DEVICE — SUTURE NRLN SZ 4-0 L18IN NONABSORBABLE BLK L13MM TF 1/2 CIR C584D

## (undated) DEVICE — SPONGE,LAP,4"X18",XR,ST,5/PK,40PK/CS: Brand: MEDLINE INDUSTRIES, INC.

## (undated) DEVICE — SPONGE,NEURO,.75"X.75",XR,STRL,LF,10/PK: Brand: MEDLINE

## (undated) DEVICE — PRESSURE MONITORING SET: Brand: TRUWAVE, VAMP PLUS

## (undated) DEVICE — DRESSING FOAM DISK DIA1IN H 7MM HYDRPHLC CHG IMPREG IN SL

## (undated) DEVICE — SSC BONE WAX: Brand: SSC BONE WAX

## (undated) DEVICE — CONTROL SYRINGE LUER-LOCK TIP: Brand: MONOJECT

## (undated) DEVICE — PROBE 8225101 5PK STD PRASS FL TIP ROHS

## (undated) DEVICE — SPONGE,NEURO,0.5"X0.5",XR,STRL,10/PK: Brand: MEDLINE

## (undated) DEVICE — AGENT HEMOSTATIC SURGIFLOW MATRIX KIT W/THROMBIN

## (undated) DEVICE — SURGIFOAM SPNG SZ 100

## (undated) DEVICE — CARBIDE ROUND

## (undated) DEVICE — TELFA NON-ADHERENT ABSORBENT DRESSING: Brand: TELFA

## (undated) DEVICE — TRAP FLUID

## (undated) DEVICE — SEALANT SURG 13 YR DURA AUTOSPRAY ADHERUS

## (undated) DEVICE — RELOAD STPL H35XL60MM BLU REG B FORM NAT ARTC ECHELON

## (undated) DEVICE — TROCAR ENDOSCP L100MM DIA12MM UNIV SL OBT RADLUC STBL SL

## (undated) DEVICE — SUTURE VCRL SZ 0 L54IN ABSRB UD POLYGLACTIN 910 COAT BRAID J608H

## (undated) DEVICE — 3M™ TEGADERM™ TRANSPARENT FILM DRESSING FRAME STYLE, 1624W, 2-3/8 IN X 2-3/4 IN (6 CM X 7 CM), 100/CT 4CT/CASE: Brand: 3M™ TEGADERM™

## (undated) DEVICE — BOWL MED L 32OZ PLAS W/ MOLD GRAD EZ OPN PEEL PCH

## (undated) DEVICE — DEVICE SUT SHFT L34CM DIA 10MM 2 JAW LD UNIT ENDOSTCH

## (undated) DEVICE — GARMENT,MEDLINE,DVT,INT,CALF,MED, GEN2: Brand: MEDLINE

## (undated) DEVICE — 4.0MM ROUND COARSE DIAMOND, EXTENDED

## (undated) DEVICE — SYRINGE IRRIG 60ML SFT PLIABLE BLB EZ TO GRP 1 HND USE W/

## (undated) DEVICE — SHEET,DRAPE,40X58,STERILE: Brand: MEDLINE

## (undated) DEVICE — 39" SINGLE PATIENT USE HOVERMATT: Brand: SINGLE PATIENT USE HOVERMATT

## (undated) DEVICE — CATH FOLEY URETH 24FR 5ML 2 WAY HYDROPH

## (undated) DEVICE — STAPLER SKIN L440MM 32MM LNG 12 FIRING B FRM PWR + GRIPPING

## (undated) DEVICE — SKIN AFFIX SURG ADHESIVE 72/CS 0.55ML: Brand: MEDLINE

## (undated) DEVICE — HOOK RETRACT STERIL 12MM S STL BLNT E STAY LONE STAR

## (undated) DEVICE — TOOL MR8-F2/7TA23 MR8 F2/7CM TAPER 2.3MM: Brand: MIDAS REX MR8

## (undated) DEVICE — SUTURE VICRYL + SZ 3-0 L18IN ABSRB UD SH 1/2 CIR TAPERCUT NDL VCP864D

## (undated) DEVICE — SOLUTION IV 1000ML 0.9% SOD CHL

## (undated) DEVICE — COVER LT HNDL BLU PLAS

## (undated) DEVICE — NEEDLE INSUF L150MM DIA2MM DISP FOR PNEUMOPERI ENDOPATH

## (undated) DEVICE — TROCAR ENDOSCP L100MM DIA12MM STBL SL BLDELSS ENDOPATH XCEL

## (undated) DEVICE — ADHESIVE SKIN CLSR 0.7ML TOP DERMBND ADV

## (undated) DEVICE — Device

## (undated) DEVICE — ANES EXTENSION SET 90IN-LF: Brand: MEDLINE INDUSTRIES, INC.

## (undated) DEVICE — 2.0MM COARSE DIAMOND, EXTENDED

## (undated) DEVICE — AGENT HEMOSTATIC SURG ORIGINAL ABS 2X14IN LOOSE KNIT 12/CA

## (undated) DEVICE — DEVICE SUT W/ SZ 0 L48IN VLT POLYSRB SUT DISP ES-9 ENDO

## (undated) DEVICE — 3M™ TEGADERM™ TRANSPARENT FILM DRESSING FRAME STYLE, 1628, 6 IN X 8 IN (15 CM X 20 CM), 10/CT 8CT/CASE: Brand: 3M™ TEGADERM™

## (undated) DEVICE — SHEARS LAP L45CM DIA5MM ULTRASONIC CRV TIP ADV HEMSTAS HARM

## (undated) DEVICE — RELOAD STPL H1.8-3.8MM REG THCK TISS G 6 ROW GRIPPING SURF

## (undated) DEVICE — COTTON BALLS, DOUBLE STRUNG: Brand: DEROYAL

## (undated) DEVICE — SUTURE ETHILON SZ 3-0 L18IN NONABSORBABLE BLK PS-2 L19MM 3/8 1669H

## (undated) DEVICE — BLADE CLIPPER SURG SENSICLIP

## (undated) DEVICE — CATHETER TRAY 16 FR 5 CC FOL ANTIREFLX SAMPLING PRT DOVER

## (undated) DEVICE — TOBRA BONE BASKET- AUTOGRAFT BONE COLLECTION SYSTEM WITH MESH FILTER AND GRAFT COMPRESSOR: Brand: TOBRA BONE BASKET

## (undated) DEVICE — MAX-CORE® DISPOSABLE CORE BIOPSY INSTRUMENT, 18G X 20CM: Brand: MAX-CORE

## (undated) DEVICE — LAPAROSCOPY PK

## (undated) DEVICE — CRANI: Brand: MEDLINE INDUSTRIES, INC.

## (undated) DEVICE — CRADLE ANK AND FT ELEV FLAT END POLY FOAM W/ VENT H NEUT

## (undated) DEVICE — DISPOSABLE IRRIGATION CASSETTE: Brand: CORE

## (undated) DEVICE — 6.0MM PRECISION ROUND

## (undated) DEVICE — 3.0MM COARSE DIAMOND

## (undated) DEVICE — COVER LT HNDL CAM BLU DISP W/ SURG CTRL

## (undated) DEVICE — SELF COOLING BURN BLANKET: Brand: KENDALL

## (undated) DEVICE — SYRINGE MEDICAL 3ML CLEAR PLASTIC STANDARD NON CONTROL LUERLOCK TIP DISPOSABLE

## (undated) DEVICE — NEEDLE HYPO 22GA L1.5IN BLK POLYPR HUB S STL REG BVL STR

## (undated) DEVICE — GLOVE ORANGE PI 7 1/2   MSG9075

## (undated) DEVICE — LOTION PREP REMV 5OZ IODO CLR TINC OF BENZ DURAPREP

## (undated) DEVICE — PMI DISPOSABLE PUNCTURE CLOSURE DEVICE / SUTURE GRASPER: Brand: PMI

## (undated) DEVICE — TOWEL,STOP FLAG GOLD N-W: Brand: MEDLINE

## (undated) DEVICE — 3M™ TEGADERM™ TRANSPARENT FILM DRESSING FRAME STYLE, 1626W, 4 IN X 4-3/4 IN (10 CM X 12 CM), 50/CT 4CT/CASE: Brand: 3M™ TEGADERM™

## (undated) DEVICE — STERILE POLYISOPRENE POWDER-FREE SURGICAL GLOVES: Brand: PROTEXIS

## (undated) DEVICE — TRUE CONTENT TO BE POPULATED AS PART OF REBRANDING: Brand: ARGYLE

## (undated) DEVICE — SUTURE VICRYL SZ 2-0 L18IN ABSRB UD CT-1 L36MM 1/2 CIR J839D

## (undated) DEVICE — GOWN,AURORA,NONREINF,RAGLAN,XXL,STERILE: Brand: MEDLINE

## (undated) DEVICE — GLOVE SURG SZ 6 THK91MIL LTX FREE SYN POLYISOPRENE ANTI

## (undated) DEVICE — DRAPE MICSCP W54XL150IN W/ 4 BINOC GLS LENS LEICA

## (undated) DEVICE — SYRINGE 20ML LL S/C 50

## (undated) DEVICE — CATHETER,FOLEY,100%SILICONE,16FR,10ML,LF: Brand: MEDLINE

## (undated) DEVICE — SUTURE VCRL SZ 4-0 L18IN ABSRB UD L19MM PS-2 3/8 CIR PRIM J496H

## (undated) DEVICE — SOLUTION IV IRRIG POUR BRL 0.9% SODIUM CHL 2F7124

## (undated) DEVICE — KIT CATHETER 20GA L12CM ART CUST

## (undated) DEVICE — ARM CRADLE: Brand: DEVON

## (undated) DEVICE — SOLUTION IV 500ML 0.9% SOD CHL PH 5 INJ USP VIAFLX PLAS

## (undated) DEVICE — APPLICATOR MEDICATED 10.5 CC SOLUTION HI LT ORNG CHLORAPREP

## (undated) DEVICE — DRAPE,LAP,CHOLE,W/TROUGHS,STERILE: Brand: MEDLINE

## (undated) DEVICE — BLADE,CARBON-STEEL,15,STRL,DISPOSABLE,TB: Brand: MEDLINE

## (undated) DEVICE — 3.0MM COARSE DIAMOND, EXTENDED

## (undated) DEVICE — SOLUTION ANTIFOG VIS SYS CLEARIFY LAPSCP